# Patient Record
Sex: FEMALE | Race: WHITE | NOT HISPANIC OR LATINO | Employment: OTHER | URBAN - METROPOLITAN AREA
[De-identification: names, ages, dates, MRNs, and addresses within clinical notes are randomized per-mention and may not be internally consistent; named-entity substitution may affect disease eponyms.]

---

## 2017-06-11 ENCOUNTER — HOSPITAL ENCOUNTER (EMERGENCY)
Facility: CLINIC | Age: 47
Discharge: HOME | End: 2017-06-11
Attending: EMERGENCY MEDICINE | Admitting: EMERGENCY MEDICINE
Payer: MEDICARE

## 2017-06-11 VITALS
WEIGHT: 135 LBS | SYSTOLIC BLOOD PRESSURE: 120 MMHG | DIASTOLIC BLOOD PRESSURE: 78 MMHG | BODY MASS INDEX: 23.92 KG/M2 | TEMPERATURE: 98 F | HEIGHT: 63 IN | OXYGEN SATURATION: 96 % | RESPIRATION RATE: 14 BRPM | HEART RATE: 78 BPM

## 2017-06-11 DIAGNOSIS — F41.9 ANXIETY: ICD-10-CM

## 2017-06-11 DIAGNOSIS — G47.00 INSOMNIA, UNSPECIFIED TYPE: ICD-10-CM

## 2017-06-11 DIAGNOSIS — F32.A DEPRESSION, UNSPECIFIED DEPRESSION TYPE: ICD-10-CM

## 2017-06-11 PROCEDURE — 99282 EMERGENCY DEPT VISIT SF MDM: CPT

## 2017-06-11 RX ORDER — LORAZEPAM 0.5 MG/1
.5-1 TABLET ORAL EVERY 8 HOURS PRN
Qty: 20 TABLET | Refills: 0 | Status: SHIPPED | OUTPATIENT
Start: 2017-06-11 | End: 2017-12-19

## 2017-06-11 ASSESSMENT — ENCOUNTER SYMPTOMS
APPETITE CHANGE: 1
NERVOUS/ANXIOUS: 1
SLEEP DISTURBANCE: 1

## 2017-06-11 NOTE — ED NOTES
Patient reports increased anxiety and worsening depression over last two weeks with near daily panic attacks and sleeping 2-3 hours per night.  States recent stressors includes not seeing her daughter and new granddaughter.  Here with good friend and PCA.  Denies any suicidal ideations.  Has history of depression but not currently taking medication other than her seizure medications.  No meds for anxiety.  Says she does not have a psychiatrist or therapist at this time.

## 2017-06-11 NOTE — ED PROVIDER NOTES
"  History     Chief Complaint:  Anxiety     HPI   Suzi Mckoy is a 47 year old female with epilepsy who presents to the emergency department today with her PCA who is also her boyfriend for evaluation of anxiety. The patient reports that for the past 2-3 weeks she has been dealing with increased depression, anxiety, and difficulty sleeping. She states that she has only been getting two hours of sleep per night and has tried Benadryl, Melatonin, and Nyquil with little effect. She reports that her anxiety makes her feel like she just wants to \"run out of the house.\" The patient reports that her depression stems from the fact that she had a falling out with her daughter and her daughter just had a child who the patient has not yet been able to meet because of this recent falling out. The patient also reports that her ex- is has an alcohol problem. The patient endorses a decreased appetite, decreased focus, and decreased energy level. She denies any suicidal or homicidal ideation. The patient reports that she use to take Seroquel for her depression but is not currently taking anything. She notes that she has seen Dr. Jero Hernandez of the [facility] in the past.     Allergies:  No Known Drug Allergies    Medications:    Keppra  Felbatol  Lamictal  Mirena    Past Medical History:    Anxiety   Cerebral embolism with cerebral infarction   Depressive disorder   Epilepsy complicating pregnancy, childbirth, or the puerperium, unspecified as to episode of care or not applicable  Memory loss      Past Surgical History:     delivery   Craniotomy  Laser wart, vulvar warts     Family History:    Maternal Grandmother: Breast Cancer   Maternal Grandfather: Prostate Cancer  Father: Heart Disease, Genitourinary Problems    Social History:  The patient was accompanied to the ED by her PCA who is also her boyfriend.  Smoking Status: Former Smoker  Alcohol Use: Positive  Marital Status:  Single [1] " "    Review of Systems   Constitutional: Positive for appetite change (Decreased).   Psychiatric/Behavioral: Positive for sleep disturbance (Unable to sleep well). Negative for suicidal ideas. The patient is nervous/anxious.         Depression   All other systems reviewed and are negative.    Physical Exam   Vitals:  Patient Vitals for the past 24 hrs:   BP Pulse Resp SpO2 Height Weight   06/11/17 0404 - - - 99 % - -   06/11/17 0403 123/62 89 18 - 1.6 m (5' 3\") 61.2 kg (135 lb)       Physical Exam  General: Patient in mild distress.  Alert and cooperative with exam. Normal mentation  HEENT: NC/AT. Conjunctiva without injection or scleral icterus. External ears normal.  Respiratory: Breathing comfortably on room air  CV: Normal rate, all extremities well perfused  GI:  Non-distended abdomen  Skin: Warm, dry, no rashes/open wounds on exposed skin  Musculoskeletal: No obvious deformities  Neuro: Alert, answers questions appropriately. No gross motor deficits  Psychiatric: Endorses depression with decreased sleep, appetite, and energy. Denies anhedonia, SI, and HI.     Emergency Department Course     Emergency Department Course:  Nursing notes and vitals reviewed.  I performed an exam of the patient as documented above.   I discussed the treatment plan with the patient. They expressed understanding of this plan and consented to discharge. They will be discharged home with instructions for care and follow up. In addition, the patient will return to the emergency department if their symptoms persist, worsen, if new symptoms arise or if there is any concern.  All questions were answered.  I personally reviewed the physical exam results with the patient and answered all related questions prior to discharge.  Impression & Plan      Medical Decision Making:  Suzi Mckoy is a 47 year old female who presents with a several week history of worsening depression, anxiety, and insomnia. Patient's medical history and records were " reviewed. The patient denies suicidal or homicidal ideation on exam. She has no regular psychiatry follow up and was on Seroquel in the past but has since discontinued this medication. No indication at this time for laboratory or imaging work up. Outpatient referral made to mental health services. Patient was provided short prescription for Ativan for anxiety and recommended close follow up with primary care physician. Return precautions discussed. Patient discharged to home.     Diagnosis:    ICD-10-CM    1. Depression, unspecified depression type F32.9 MENTAL HEALTH REFERRAL   2. Anxiety F41.9    3. Insomnia, unspecified type G47.00      Disposition:   The patient is discharged to home.    Discharge Medications:  New Prescriptions    LORAZEPAM (ATIVAN) 0.5 MG TABLET    Take 1-2 tablets (0.5-1 mg) by mouth every 8 hours as needed for anxiety     Scribe Disclosure:  Frantz ANGELES, am serving as a scribe at 4:33 AM on 6/11/2017 to document services personally performed by Marvin Natarajan DO, based on my observations and the provider's statements to me.     EMERGENCY DEPARTMENT       Marvin Natarajan DO  06/11/17 0700

## 2017-06-11 NOTE — ED AVS SNAPSHOT
Emergency Department    [address] [place] [id number]    Phone:  [phone]    Fax:  [phone]                                       Suzi Mckoy   MRN: [id number]    Department:   Emergency Department   Date of Visit:  6/11/2017           After Visit Summary Signature Page     I have received my discharge instructions, and my questions have been answered. I have discussed any challenges I see with this plan with the nurse or doctor.    ..........................................................................................................................................  Patient/Patient Representative Signature      ..........................................................................................................................................  Patient Representative Print Name and Relationship to Patient    ..................................................               ................................................  Date                                            Time    ..........................................................................................................................................  Reviewed by Signature/Title    ...................................................              ..............................................  Date                                                            Time

## 2017-06-11 NOTE — DISCHARGE INSTRUCTIONS
Understanding Anxiety Disorders  Almost everyone gets nervous now and then. It s normal to have knots in your stomach before a test, or for your heart to race on a first date. But an anxiety disorder is much more than a case of nerves. In fact, its symptoms may be overwhelming. But treatment can relieve many of these symptoms. Talking to your doctor is the first step.    What are anxiety disorders?  An anxiety disorder causes intense feelings of panic and fear. These feelings may arise for no apparent reason. And they tend to recur again and again. They may prevent you from coping with life and cause you great distress. As a result, you may avoid anything that triggers your fear. In extreme cases, you may never leave the house. Anxiety disorders may cause other symptoms, such as:    Obsessive thoughts you can t control    Constant nightmares or painful thoughts of the past    Nausea, sweating, and muscle tension    Difficulty sleeping or concentrating  What causes anxiety disorders?  Anxiety disorders tend to run in families. For some people, childhood abuse or neglect may play a role. For others, stressful life events or trauma may trigger anxiety disorders. Anxiety can trigger low self-esteem and poor coping skills.  Common anxiety disorders    Panic disorder: This causes an intense fear of being in danger.    Phobias: These are extreme fears of certain objects, places, or events.    Obsessive-compulsive disorder: This causes you to have unwanted thoughts. You also may perform certain actions over and over.    Posttraumatic stress disorder: This occurs in people who have survived a terrible ordeal. It can cause nightmares and flashbacks about the event.    Generalized anxiety disorder: This causes constant worry that can greatly disrupt your life.   Getting better  You may believe that nothing can help you. Or, you might fear what others may think. But most anxiety symptoms can be eased. Having an anxiety  disorder is nothing to be ashamed of. Most people do best with treatment that combines medication and therapy. Although these aren t cures, they can help you live a healthier life.    1090-4390 [employer]. [address] [place] [address], [place] [address]. All rights reserved. This information is not intended as a substitute for professional medical care. Always follow your healthcare professional's instructions.

## 2017-06-11 NOTE — ED AVS SNAPSHOT
Emergency Department    [address] [place] [id number]    Phone:  [phone]    Fax:  [phone]                                       Suzi Mckoy   MRN: [id number]    Department:   Emergency Department   Date of Visit:  6/11/2017           Patient Information     Date Of Birth          1970        Your diagnoses for this visit were:     Depression, unspecified depression type     Anxiety     Insomnia, unspecified type        You were seen by Marvin Natarajan DO.      Follow-up Information     Follow up with Primary care doctor In 3 days.        Discharge Instructions         Understanding Anxiety Disorders  Almost everyone gets nervous now and then. It s normal to have knots in your stomach before a test, or for your heart to race on a first date. But an anxiety disorder is much more than a case of nerves. In fact, its symptoms may be overwhelming. But treatment can relieve many of these symptoms. Talking to your doctor is the first step.    What are anxiety disorders?  An anxiety disorder causes intense feelings of panic and fear. These feelings may arise for no apparent reason. And they tend to recur again and again. They may prevent you from coping with life and cause you great distress. As a result, you may avoid anything that triggers your fear. In extreme cases, you may never leave the house. Anxiety disorders may cause other symptoms, such as:    Obsessive thoughts you can t control    Constant nightmares or painful thoughts of the past    Nausea, sweating, and muscle tension    Difficulty sleeping or concentrating  What causes anxiety disorders?  Anxiety disorders tend to run in families. For some people, childhood abuse or neglect may play a role. For others, stressful life events or trauma may trigger anxiety disorders. Anxiety can trigger low self-esteem and poor coping skills.  Common anxiety disorders    Panic disorder: This causes an intense fear of  being in danger.    Phobias: These are extreme fears of certain objects, places, or events.    Obsessive-compulsive disorder: This causes you to have unwanted thoughts. You also may perform certain actions over and over.    Posttraumatic stress disorder: This occurs in people who have survived a terrible ordeal. It can cause nightmares and flashbacks about the event.    Generalized anxiety disorder: This causes constant worry that can greatly disrupt your life.   Getting better  You may believe that nothing can help you. Or, you might fear what others may think. But most anxiety symptoms can be eased. Having an anxiety disorder is nothing to be ashamed of. Most people do best with treatment that combines medication and therapy. Although these aren t cures, they can help you live a healthier life.    5796-9283 [employer]. [address] [place] [address]. All rights reserved. This information is not intended as a substitute for professional medical care. Always follow your healthcare professional's instructions.          Discharge References/Attachments     DEPRESSION (ENGLISH)      24 Hour Appointment Hotline       To make an appointment at any [facility] clinic, call [phone] ([phone]). If you don't have a family doctor or clinic, we will help you find one. [facility] are conveniently located to serve the needs of you and your family.          ED Discharge Orders     MENTAL HEALTH REFERRAL       Your provider has referred you to: [facility]: [facility] - [facility] [phone]   [email or web address]   *Referral from [facility] Primary Care Provider required - Consultation Model - medication management & future refills will be returned to [facility] PCP upon completion of evaluation  *Please call to schedule an appointment.    All scheduling is subject to the  client's specific insurance plan & benefits, provider/location availability, and provider clinical specialities.  Please arrive 15 minutes early for your first appointment and bring your completed paperwork.    Please be aware that coverage of these services is subject to the terms and limitations of your health insurance plan.  Call member services at your health plan with any benefit or coverage questions.                     Review of your medicines      START taking        Dose / Directions Last dose taken    LORazepam 0.5 MG tablet   Commonly known as:  ATIVAN   Dose:  0.5-1 mg   Quantity:  20 tablet        Take 1-2 tablets (0.5-1 mg) by mouth every 8 hours as needed for anxiety   Refills:  0          Our records show that you are taking the medicines listed below. If these are incorrect, please call your family doctor or clinic.        Dose / Directions Last dose taken    FELBATOL 600 MG tablet   Generic drug:  felbamate        1 TABLET 4 TIMES DAILY   Refills:  0        KEPPRA 500 MG tablet   Generic drug:  levETIRAcetam        6 daily   Refills:  0        LAMICTAL 100 MG tablet   Generic drug:  lamoTRIgine        1 TABLET TWICE DAILY   Refills:  0        MIRENA (52 MG) 20 MCG/24HR IUD   Quantity:  1   Generic drug:  levonorgestrel        insert per routine   Refills:  0                Prescriptions were sent or printed at these locations (1 Prescription)                   Other Prescriptions                Printed at Department/Unit printer (1 of 1)         LORazepam (ATIVAN) 0.5 MG tablet                Orders Needing Specimen Collection     None      Pending Results     No orders found from 6/9/2017 to 6/12/2017.            Pending Culture Results     No orders found from 6/9/2017 to 6/12/2017.            Pending Results Instructions     If you had any lab results that were not finalized at the time of your Discharge, you can call the ED Lab Result RN at [phone]. You will be contacted by this team for  any positive Lab results or changes in treatment. The nurses are available 7 days a week from 10A to 6:30P.  You can leave a message 24 hours per day and they will return your call.        Test Results From Your Hospital Stay               Clinical Quality Measure: Blood Pressure Screening     Your blood pressure was checked while you were in the emergency department today. The last reading we obtained was  BP: 123/62 . Please read the guidelines below about what these numbers mean and what you should do about them.  If your systolic blood pressure (the top number) is less than 120 and your diastolic blood pressure (the bottom number) is less than 80, then your blood pressure is normal. There is nothing more that you need to do about it.  If your systolic blood pressure (the top number) is 120-139 or your diastolic blood pressure (the bottom number) is 80-89, your blood pressure may be higher than it should be. You should have your blood pressure rechecked within a year by a primary care provider.  If your systolic blood pressure (the top number) is 140 or greater or your diastolic blood pressure (the bottom number) is 90 or greater, you may have high blood pressure. High blood pressure is treatable, but if left untreated over time it can put you at risk for heart attack, stroke, or kidney failure. You should have your blood pressure rechecked by a primary care provider within the next 4 weeks.  If your provider in the emergency department today gave you specific instructions to follow-up with your doctor or provider even sooner than that, you should follow that instruction and not wait for up to 4 weeks for your follow-up visit.        Thank you for choosing [facility]       Thank you for choosing [facility] for your care. Our goal is always to provide you with excellent care. Hearing back from our patients is one way we can continue to improve our services. Please take a few minutes to complete the written survey that  "you may receive in the mail after you visit with us. Thank you!        [employer] Information     [employer] lets you send messages to your doctor, view your test results, renew your prescriptions, schedule appointments and more. To sign up, go to [email or web address] . Click on \"Log in\" on the left side of the screen, which will take you to the Welcome page. Then click on \"Sign up Now\" on the right side of the page.     You will be asked to enter the access code listed below, as well as some personal information. Please follow the directions to create your username and password.     Your access code is: [email or web address]  Expires: 2017  5:09 AM     Your access code will  in 90 days. If you need help or a new code, please call your [facility] clinic or [phone].        Care EveryWhere ID     This is your Care EveryWhere ID. This could be used by other organizations to access your [facility] medical records  [id number]        After Visit Summary       This is your record. Keep this with you and show to your community pharmacist(s) and doctor(s) at your next visit.                  "

## 2017-12-18 ENCOUNTER — HOSPITAL ENCOUNTER (EMERGENCY)
Facility: CLINIC | Age: 47
Discharge: PSYCHIATRIC OR SUBSTANCE USE FACILITY | End: 2017-12-18
Attending: EMERGENCY MEDICINE | Admitting: EMERGENCY MEDICINE
Payer: MEDICARE

## 2017-12-18 ENCOUNTER — HOSPITAL ENCOUNTER (INPATIENT)
Facility: CLINIC | Age: 47
LOS: 1 days | Discharge: HOME | DRG: 885 | End: 2017-12-19
Attending: PSYCHIATRY & NEUROLOGY | Admitting: PSYCHIATRY & NEUROLOGY
Payer: MEDICARE

## 2017-12-18 VITALS
SYSTOLIC BLOOD PRESSURE: 121 MMHG | RESPIRATION RATE: 18 BRPM | TEMPERATURE: 98.4 F | BODY MASS INDEX: 23.92 KG/M2 | WEIGHT: 135 LBS | HEIGHT: 63 IN | DIASTOLIC BLOOD PRESSURE: 80 MMHG | HEART RATE: 88 BPM | OXYGEN SATURATION: 98 %

## 2017-12-18 DIAGNOSIS — R45.851 SUICIDAL IDEATION: ICD-10-CM

## 2017-12-18 PROBLEM — R45.89 SUICIDAL BEHAVIOR: Status: ACTIVE | Noted: 2017-12-18

## 2017-12-18 LAB
AMPHETAMINES UR QL SCN: NEGATIVE
BARBITURATES UR QL: NEGATIVE
BENZODIAZ UR QL: NEGATIVE
CANNABINOIDS UR QL SCN: NEGATIVE
COCAINE UR QL: NEGATIVE
HCG UR QL: NEGATIVE
OPIATES UR QL SCN: NEGATIVE
PCP UR QL SCN: NEGATIVE

## 2017-12-18 PROCEDURE — 90791 PSYCH DIAGNOSTIC EVALUATION: CPT

## 2017-12-18 PROCEDURE — 12400007 ZZH R&B MH INTERMEDIATE UMMC

## 2017-12-18 PROCEDURE — 80307 DRUG TEST PRSMV CHEM ANLYZR: CPT | Performed by: EMERGENCY MEDICINE

## 2017-12-18 PROCEDURE — 99285 EMERGENCY DEPT VISIT HI MDM: CPT | Mod: 25

## 2017-12-18 PROCEDURE — 81025 URINE PREGNANCY TEST: CPT | Performed by: EMERGENCY MEDICINE

## 2017-12-18 RX ORDER — TRAZODONE HYDROCHLORIDE 50 MG/1
50 TABLET, FILM COATED ORAL
Status: DISCONTINUED | OUTPATIENT
Start: 2017-12-18 | End: 2017-12-19 | Stop reason: HOSPADM

## 2017-12-18 RX ORDER — ACETAMINOPHEN 325 MG/1
650 TABLET ORAL EVERY 4 HOURS PRN
Status: DISCONTINUED | OUTPATIENT
Start: 2017-12-18 | End: 2017-12-19 | Stop reason: HOSPADM

## 2017-12-18 RX ORDER — OLANZAPINE 5 MG/1
5-10 TABLET ORAL 3 TIMES DAILY PRN
Status: DISCONTINUED | OUTPATIENT
Start: 2017-12-18 | End: 2017-12-19 | Stop reason: HOSPADM

## 2017-12-18 RX ORDER — HYDROXYZINE HYDROCHLORIDE 25 MG/1
25-50 TABLET, FILM COATED ORAL EVERY 4 HOURS PRN
Status: DISCONTINUED | OUTPATIENT
Start: 2017-12-18 | End: 2017-12-19 | Stop reason: HOSPADM

## 2017-12-18 RX ORDER — OLANZAPINE 10 MG/2ML
5-10 INJECTION, POWDER, FOR SOLUTION INTRAMUSCULAR 3 TIMES DAILY PRN
Status: DISCONTINUED | OUTPATIENT
Start: 2017-12-18 | End: 2017-12-19 | Stop reason: HOSPADM

## 2017-12-18 RX ORDER — ALUMINA, MAGNESIA, AND SIMETHICONE 2400; 2400; 240 MG/30ML; MG/30ML; MG/30ML
30 SUSPENSION ORAL EVERY 4 HOURS PRN
Status: DISCONTINUED | OUTPATIENT
Start: 2017-12-18 | End: 2017-12-19 | Stop reason: HOSPADM

## 2017-12-18 RX ORDER — BISACODYL 10 MG
10 SUPPOSITORY, RECTAL RECTAL DAILY PRN
Status: DISCONTINUED | OUTPATIENT
Start: 2017-12-18 | End: 2017-12-19 | Stop reason: HOSPADM

## 2017-12-18 NOTE — ED NOTES
Pt friend, Getachew, brings pt her purse, baby doll and medications. Per MD, pt can take her 1600 dose of medications. Pt takes them with this RN at bedside. Pt given baby doll and treats the doll as if it is her baby. Friend allowed in the room with pt.

## 2017-12-18 NOTE — ED PROVIDER NOTES
"  History     Chief Complaint:  Suicidal      The history is provided by the patient.      Suzi Mckoy is a 47 year old female with a history of seizures currently using Felbatol, Keppra, and Ativan who presents to the ED via police for evaluation of suicidal intentions. Earlier today, the patient called her daughter, who called 911 as the patient was threatening to jump over a balcony. Police found her looking over the balcony and brought her to the ED.     Here in the ED, the patient states that she feels depressed and \"worried\" because her daughter will not come home for the holidays. She states she is being ostracized from her daughter and and her grandchild. The patient denies suicidal or homicidal intention.  Denies auditory or visual hallucinations.  Denies history of psychiatric disorder hospitalization.  Denies use of psychiatric medications. She denies using alcohol or drugs. She does not currently have a job and lives alone.     Allergies:  NKDA    Medications:    Ativan  Keppra  Felbatol  Lamictal  Mirena    Past Medical History:    Anxiety  Cerebral embolism with cerebral infarction  Depression  Epilepsy  Memory loss    Past Surgical History:    Craniotomy  Vulvar warts    Family History:    Heart disease    Social History:  Presented alone.  Lives alone in an apartment complex.  Unemployed.  Former smoker.  Positive for alcohol use.   Marital Status:  Single     Review of Systems   Psychiatric/Behavioral: Positive for dysphoric mood and suicidal ideas.   All other systems reviewed and are negative.    Physical Exam   First Vitals:  BP: 117/79  Pulse: 90  Temp: 98.4  F (36.9  C)  Resp: 18  Height: 160 cm (5' 3\")  SpO2: 98 %    Physical Exam  General: WD/WN; well appearing middle-aged  woman; cooperative; malodorous  Head:  Atraumatic  Eyes:  EOMI; conjunctivae, lids, and sclerae are normal  ENT:    Normal nose; MMM  Neck:  Supple; normal ROM  Resp:  No respiratory " "distress  GI:  Nondistended    MS:  Normal ROM  Skin:  Warm; non-diaphoretic; no pallor  Neuro:  Awake; A&Ox3  Psych:   Blunted affect; disorganized, tangential thinking   Vitals reviewed.    Emergency Department Course   Laboratory:  Drug abuse screen 77 urine (WY, RH, SH): All are negative  HCG qualitative urine: negative    Emergency Department Course:  Nursing notes and vitals reviewed. 1550 I performed an exam of the patient as documented above.     The patient provided a urine sample here in the emergency department. This was sent for laboratory testing, findings above.     1755 I discussed the case with DEC.    1800 I rechecked the patient and discussed the results of her workup thus far and plan for inpatient hospitalization.    Findings and plan explained to the patient. Patient will be transferred to [place] [address] via EMS. Dr. Painting accepts patient for further monitoring, evaluation, and treatment.    Impression & Plan    Medical Decision Making:  Suzi Mckoy is a 47 year old female who presents on a police-initiated health officer hold after she was reportedly looking off the balcony of her apartment complex indicating she may jump. Patient states she does not need to be here and that her daughter was just concerned after she made some statements she \"shouldn't have made\" which she states is just due to a lot of worry she has for her daughter. Later, patient's friend arrived and states the patient did say she wanted to jump off the balcony to kill herself and made other suicidal statements. Patient has some odd behaviors on exam, treating a doll as if it is her baby. She has somewhat tangential thinking and is perseverating on her daughter and how her daughter treats her grandchild.    Urine drug screen is negative. Patient is not pregnant. She was evaluated by DEC  who agrees that given the statements she made per her friend's report, as well as the 's written statement, that " she warrants admission to inpatient psychiatry for suicidal ideation although patient states she is not suicidal. I do not feel comfortable with discharge home at this time given the reported history of today's events. Further, she seems somewhat cognitively delayed and I have some worry for her ability for her to take care of herself, particularly with tangential thinking. It is unclear if patient's tangential thoughts and perseverating on her daughter's treatment of her grandchild is indicative of psychosis or if this is baseline for the patient. I discussed with the patient that she would be admitted to psychiatry for further evaluation of her suicidal statements as I do not believe it is safe for her to go home. Patient is unhappy with this plan as she believes she can be discharged. However, patient was accepted to [facility] [address] under Dr. Painting and Michelle Aranda. Patient was transferred via ambulance on a transfer hold for further evaluation and treatment by psychiatry.       Diagnosis:    ICD-10-CM   1. Suicidal ideation R45.851       Disposition:  Transferred to [facility] [address] via ambulance under the care of Dr. Painting and Michelle Aranda.    I, Vikki Goodwin, am serving as a scribe on 12/18/2017 at 1550 PM to personally document services performed by Amber Vidales MD based on my observations and the provider's statements to me.       Vikki Goodwin  12/18/2017    EMERGENCY DEPARTMENT       Amber Vidales MD  12/19/17 0037

## 2017-12-18 NOTE — IP AVS SNAPSHOT
[address]    [address]    [place] [address]    Phone:  [phone]                                       After Visit Summary   12/18/2017    Suzi Mckoy    MRN: [id number]           After Visit Summary Signature Page     I have received my discharge instructions, and my questions have been answered. I have discussed any challenges I see with this plan with the nurse or doctor.    ..........................................................................................................................................  Patient/Patient Representative Signature      ..........................................................................................................................................  Patient Representative Print Name and Relationship to Patient    ..................................................               ................................................  Date                                            Time    ..........................................................................................................................................  Reviewed by Signature/Title    ...................................................              ..............................................  Date                                                            Time

## 2017-12-18 NOTE — IP AVS SNAPSHOT
MRN:[id number]                      After Visit Summary   12/18/2017    Suzi Mckoy    MRN: [id number]           Thank you!     Thank you for choosing [facility] for your care. Our goal is always to provide you with excellent care.        Patient Information     Date Of Birth          1970        Designated Caregiver       Most Recent Value    Caregiver    Will someone help with your care after discharge? yes    Name of designated caregiver Joseluis ROJAS    Phone number of caregiver [phone]    Caregiver address [address] [facility]      About your hospital stay     You were admitted on:  December 18, 2017 You last received care in the:  [facility]    You were discharged on:  December 19, 2017       Who to Call     For medical emergencies, please call 911.  For non-urgent questions about your medical care, please call your primary care provider or clinic, None          Attending Provider     Provider Specialty    Owen Painting MD Psychiatry       Primary Care Provider Fax #    Physician No Ref-Primary [phone]      Further instructions from your care team       . Behavioral Discharge Planning and Instructions      Summary:  You were admitted on 12/18/2017  due to Suicidal Ideations.  You were treated by Michelle Doshi NP and discharged on 12/19/2017 from [facility] to Home      Health Care Follow-up Appointments:   Medical Appointment Date/Time: You plan to set this up upon discharge    Provider: Dr. Hernandez  [facility] [address], [place] [address]  Phone: [phone]    Your  is Ysabel Whitehead. Phone: [phone]  Fax: [phone]  You also have supportive services through [employer]  Phone: [phone]  Fax: [phone]    You stated that you are not interested in other services at this time    Attend all scheduled appointments with your outpatient providers. Call at least 24 hours in advance if you need to reschedule an  "appointment to ensure continued access to your outpatient providers.   Major Treatments, Procedures and Findings:  You were provided with: a psychiatric assessment, assessed for medical stability, medication evaluation and/or management and milieu management    Symptoms to Report: feeling more aggressive, increased confusion, losing more sleep, mood getting worse or thoughts of suicide    Early warning signs can include: increased depression or anxiety sleep disturbances increased thoughts or behaviors of suicide or self-harm  increased unusual thinking, such as paranoia or hearing voices    Safety and Wellness:  Take all medicines as directed.  Make no changes unless your doctor suggests them.      Follow treatment recommendations.  Refrain from alcohol and non-prescribed drugs.  If there is a concern for safety, call 911.    Resources:   Crisis Intervention: 946.246.8059 or 303-558-3996 (TTY: 194.274.9941).  Call anytime for help.  National [place] on Mental Illness (www.[place].tez.org): 843.677.3303 or 978-460-4632.  Suicide Awareness Voices of Education (SAVE) (www.save.org): 995-873-TSLQ (8370)  National Suicide Prevention Line (www.mentalhealthmn.org): 726-275-GKHF (7796)  Mental Health Consumer/Survivor Network of [place] (www.mhcsn.net): 651.545.8326 or 274-386-9777  Mental Health Association of [place] (www.mentalhealth.org): 336.346.2833 or 576-645-5779  Self- Management and Recovery Training., SMART-- Toll free: 398.451.6117  www."[employer]  Text 4 Life: txt \"LIFE\" to [address]2695 for immediate support and crisis intervention  Crisis text line: Text \"START\" to 988-019. Free, confidential, 24/7.  Crisis Intervention: 408.197.5182 or 446-202-7663. Call anytime for help.     The treatment team has appreciated the opportunity to work with you.     If you have any questions or concerns our unit number is [phone]  You may be receiving a follow-up phone call within the next three days from a representative from " "behavioral health.    You have identified the best phone number to reach you as [phone]           Pending Results     No orders found for last 3 day(s).            Admission Information     Date & Time Provider Department Dept. Phone    2017 Owen Painting MD [facility] [phone]      Your Vitals Were     Blood Pressure Pulse Temperature Respirations Height Weight    116/56 85 98.3  F (36.8  C) (Oral) 16 1.626 m (5' 4\") 56.7 kg (125 lb)    Pulse Oximetry BMI (Body Mass Index)                98% 21.46 kg/m2          MyChart Information     [email or web address] lets you send messages to your doctor, view your test results, renew your prescriptions, schedule appointments and more. To sign up, go to [email or web address] . Click on \"Log in\" on the left side of the screen, which will take you to the Welcome page. Then click on \"Sign up Now\" on the right side of the page.     You will be asked to enter the access code listed below, as well as some personal information. Please follow the directions to create your username and password.     Your access code is: [email or web address]  Expires: 3/19/2018  4:26 PM     Your access code will  in 90 days. If you need help or a new code, please call your [facility] clinic or [phone].        Care EveryWhere ID     This is your Care EveryWhere ID. This could be used by other organizations to access your [facility] medical records  [id number]        Equal Access to Services     [facility] AH: Hadii aad ku hadasho Soomaali, waaxda luqadaha, qaybta kaalmada adeegyada, alyssa marte . So [facility] [phone].    ATENCIÓN: Si habla español, tiene a reis disposición servicios gratuitos de asistencia lingüística. Llame al [phone].    We comply with applicable federal civil rights laws and [place] laws. We do not discriminate on the basis of race, color, national origin, age, disability, sex, sexual orientation, or gender identity.               Review of " your medicines      UNREVIEWED medicines. Ask your doctor about these medicines        Dose / Directions    FELBATOL 600 MG tablet   Generic drug:  felbamate        1 TABLET 4 TIMES DAILY   Refills:  0       KEPPRA 500 MG tablet   Generic drug:  levETIRAcetam        6 daily   Refills:  0       LAMICTAL 100 MG tablet   Generic drug:  lamoTRIgine        1 TABLET TWICE DAILY   Refills:  0       LORazepam 0.5 MG tablet   Commonly known as:  ATIVAN        Dose:  0.5-1 mg   Take 1-2 tablets (0.5-1 mg) by mouth every 8 hours as needed for anxiety   Quantity:  20 tablet   Refills:  0       MIRENA (52 MG) 20 MCG/24HR IUD   Used for:  Encounter for insertion or removal of intrauterine contraceptive device   Generic drug:  levonorgestrel        insert per routine   Quantity:  1   Refills:  0                Protect others around you: Learn how to safely use, store and throw away your medicines at www.disposemymeds.org.             Medication List: This is a list of all your medications and when to take them. Check marks below indicate your daily home schedule. Keep this list as a reference.      Medications           Morning Afternoon Evening Bedtime As Needed    FELBATOL 600 MG tablet   1 TABLET 4 TIMES DAILY   Generic drug:  felbamate                                KEPPRA 500 MG tablet   6 daily   Generic drug:  levETIRAcetam                                LAMICTAL 100 MG tablet   1 TABLET TWICE DAILY   Generic drug:  lamoTRIgine                                LORazepam 0.5 MG tablet   Commonly known as:  ATIVAN   Take 1-2 tablets (0.5-1 mg) by mouth every 8 hours as needed for anxiety                                MIRENA (52 MG) 20 MCG/24HR IUD   insert per routine   Generic drug:  levonorgestrel

## 2017-12-19 VITALS
TEMPERATURE: 98.3 F | HEIGHT: 64 IN | WEIGHT: 125 LBS | BODY MASS INDEX: 21.34 KG/M2 | HEART RATE: 85 BPM | OXYGEN SATURATION: 98 % | RESPIRATION RATE: 16 BRPM | DIASTOLIC BLOOD PRESSURE: 56 MMHG | SYSTOLIC BLOOD PRESSURE: 116 MMHG

## 2017-12-19 LAB
ALBUMIN SERPL-MCNC: 3.7 G/DL (ref 3.4–5)
ALP SERPL-CCNC: 45 U/L (ref 40–150)
ALT SERPL W P-5'-P-CCNC: 9 U/L (ref 0–50)
ANION GAP SERPL CALCULATED.3IONS-SCNC: 10 MMOL/L (ref 3–14)
AST SERPL W P-5'-P-CCNC: 12 U/L (ref 0–45)
BASOPHILS # BLD AUTO: 0 10E9/L (ref 0–0.2)
BASOPHILS NFR BLD AUTO: 0.7 %
BILIRUB SERPL-MCNC: 0.3 MG/DL (ref 0.2–1.3)
BUN SERPL-MCNC: 14 MG/DL (ref 7–30)
CALCIUM SERPL-MCNC: 8.4 MG/DL (ref 8.5–10.1)
CHLORIDE SERPL-SCNC: 105 MMOL/L (ref 94–109)
CHOLEST SERPL-MCNC: 237 MG/DL
CO2 SERPL-SCNC: 25 MMOL/L (ref 20–32)
CREAT SERPL-MCNC: 0.74 MG/DL (ref 0.52–1.04)
DIFFERENTIAL METHOD BLD: ABNORMAL
EOSINOPHIL # BLD AUTO: 0.3 10E9/L (ref 0–0.7)
EOSINOPHIL NFR BLD AUTO: 5.4 %
ERYTHROCYTE [DISTWIDTH] IN BLOOD BY AUTOMATED COUNT: 13.3 % (ref 10–15)
GFR SERPL CREATININE-BSD FRML MDRD: 85 ML/MIN/1.7M2
GLUCOSE SERPL-MCNC: 88 MG/DL (ref 70–99)
HCT VFR BLD AUTO: 39.5 % (ref 35–47)
HDLC SERPL-MCNC: 80 MG/DL
HGB BLD-MCNC: 12.1 G/DL (ref 11.7–15.7)
IMM GRANULOCYTES # BLD: 0 10E9/L (ref 0–0.4)
IMM GRANULOCYTES NFR BLD: 0.2 %
LDLC SERPL CALC-MCNC: 142 MG/DL
LYMPHOCYTES # BLD AUTO: 2.2 10E9/L (ref 0.8–5.3)
LYMPHOCYTES NFR BLD AUTO: 39.8 %
MCH RBC QN AUTO: 29.4 PG (ref 26.5–33)
MCHC RBC AUTO-ENTMCNC: 30.6 G/DL (ref 31.5–36.5)
MCV RBC AUTO: 96 FL (ref 78–100)
MONOCYTES # BLD AUTO: 0.6 10E9/L (ref 0–1.3)
MONOCYTES NFR BLD AUTO: 10 %
NEUTROPHILS # BLD AUTO: 2.5 10E9/L (ref 1.6–8.3)
NEUTROPHILS NFR BLD AUTO: 43.9 %
NONHDLC SERPL-MCNC: 157 MG/DL
NRBC # BLD AUTO: 0 10*3/UL
NRBC BLD AUTO-RTO: 0 /100
PLATELET # BLD AUTO: 339 10E9/L (ref 150–450)
POTASSIUM SERPL-SCNC: 3.6 MMOL/L (ref 3.4–5.3)
PROT SERPL-MCNC: 7.1 G/DL (ref 6.8–8.8)
RBC # BLD AUTO: 4.12 10E12/L (ref 3.8–5.2)
SODIUM SERPL-SCNC: 140 MMOL/L (ref 133–144)
TRIGL SERPL-MCNC: 77 MG/DL
TSH SERPL DL<=0.005 MIU/L-ACNC: 2.3 MU/L (ref 0.4–4)
WBC # BLD AUTO: 5.6 10E9/L (ref 4–11)

## 2017-12-19 PROCEDURE — 99231 SBSQ HOSP IP/OBS SF/LOW 25: CPT | Performed by: PHYSICIAN ASSISTANT

## 2017-12-19 PROCEDURE — 36415 COLL VENOUS BLD VENIPUNCTURE: CPT | Performed by: NURSE PRACTITIONER

## 2017-12-19 PROCEDURE — 80053 COMPREHEN METABOLIC PANEL: CPT | Performed by: NURSE PRACTITIONER

## 2017-12-19 PROCEDURE — 85025 COMPLETE CBC W/AUTO DIFF WBC: CPT | Performed by: NURSE PRACTITIONER

## 2017-12-19 PROCEDURE — 99236 HOSP IP/OBS SAME DATE HI 85: CPT | Mod: AI | Performed by: NURSE PRACTITIONER

## 2017-12-19 PROCEDURE — 84443 ASSAY THYROID STIM HORMONE: CPT | Performed by: NURSE PRACTITIONER

## 2017-12-19 PROCEDURE — 80061 LIPID PANEL: CPT | Performed by: NURSE PRACTITIONER

## 2017-12-19 RX ORDER — LAMOTRIGINE 100 MG/1
100 TABLET ORAL
Status: DISCONTINUED | OUTPATIENT
Start: 2017-12-19 | End: 2017-12-19 | Stop reason: HOSPADM

## 2017-12-19 RX ORDER — LAMOTRIGINE 100 MG/1
200 TABLET ORAL 2 TIMES DAILY
Status: DISCONTINUED | OUTPATIENT
Start: 2017-12-19 | End: 2017-12-19 | Stop reason: HOSPADM

## 2017-12-19 RX ORDER — FELBAMATE 600 MG/1
600 TABLET ORAL 3 TIMES DAILY
Status: DISCONTINUED | OUTPATIENT
Start: 2017-12-19 | End: 2017-12-19 | Stop reason: CLARIF

## 2017-12-19 RX ORDER — FELBAMATE 600 MG/1
600 TABLET ORAL 3 TIMES DAILY
COMMUNITY
Start: 2017-12-19

## 2017-12-19 RX ORDER — FELBAMATE 600 MG/1
600 TABLET ORAL 3 TIMES DAILY
Status: DISCONTINUED | OUTPATIENT
Start: 2017-12-19 | End: 2017-12-19 | Stop reason: HOSPADM

## 2017-12-19 RX ORDER — LORAZEPAM 1 MG/1
2 TABLET ORAL ONCE
Status: DISCONTINUED | OUTPATIENT
Start: 2017-12-19 | End: 2017-12-19 | Stop reason: HOSPADM

## 2017-12-19 RX ORDER — LAMOTRIGINE 100 MG/1
200 TABLET ORAL 2 TIMES DAILY
Status: DISCONTINUED | OUTPATIENT
Start: 2017-12-19 | End: 2017-12-19 | Stop reason: CLARIF

## 2017-12-19 RX ORDER — LEVETIRACETAM 500 MG/1
1000 TABLET ORAL 3 TIMES DAILY
COMMUNITY
Start: 2017-12-19

## 2017-12-19 RX ORDER — LAMOTRIGINE 100 MG/1
200 TABLET ORAL 2 TIMES DAILY
COMMUNITY
Start: 2017-12-19

## 2017-12-19 RX ORDER — LEVETIRACETAM 500 MG/1
1000 TABLET ORAL 3 TIMES DAILY
Status: DISCONTINUED | OUTPATIENT
Start: 2017-12-19 | End: 2017-12-19 | Stop reason: HOSPADM

## 2017-12-19 RX ORDER — LEVETIRACETAM 500 MG/1
1000 TABLET ORAL 3 TIMES DAILY
Status: DISCONTINUED | OUTPATIENT
Start: 2017-12-19 | End: 2017-12-19 | Stop reason: CLARIF

## 2017-12-19 ASSESSMENT — ACTIVITIES OF DAILY LIVING (ADL)
GROOMING: INDEPENDENT
DRESS: INDEPENDENT
ORAL_HYGIENE: INDEPENDENT
LAUNDRY: WITH SUPERVISION
ORAL_HYGIENE: INDEPENDENT
DRESS: SCRUBS (BEHAVIORAL HEALTH)
GROOMING: HANDWASHING;INDEPENDENT
LAUNDRY: WITH SUPERVISION

## 2017-12-19 ASSESSMENT — ENCOUNTER SYMPTOMS: DYSPHORIC MOOD: 1

## 2017-12-19 NOTE — PLAN OF CARE
Problem: Patient Care Overview  Goal: Individualization & Mutuality  The patient completed the reasons for admit and goals for discharge in the personal plan of care.   Reasons for admit:  1)  misunderstood      Goals for discharge:  1)  To go dancing, singing, go to sleep, get medication, stay on schedule

## 2017-12-19 NOTE — PROGRESS NOTES
12/18/17 2200   Patient Belongings   Patient Belongings cell phone/electronics;clothing;keys;money (see comment);wallet   Disposition of Belongings Locker   Belongings Search Yes     Kept in Locker   Purse, 2 black wallet (with business cards and coupons), Cell Phone (flip phone back cover broken), phone , money ($10.00), 4 keys, baby doll, Pants and Jacket.     Kept with patient  Glasses and shirt    A               Admission:  I am responsible for any personal items that are not sent to the safe or pharmacy.  [facility] is not responsible for loss, theft or damage of any property in my possession.    Signature:  _________________________________ Date: _______  Time: _____                                              Staff Signature:  ____________________________ Date: ________  Time: _____      2nd Staff person, if patient is unable/unwilling to sign:    Signature: ________________________________ Date: ________  Time: _____     Discharge:  [facility] has returned all of my personal belongings:    Signature: _________________________________ Date: ________  Time: _____                                          Staff Signature:  ____________________________ Date: ________  Time: _____

## 2017-12-19 NOTE — PROGRESS NOTES
[facility] that lasted for approximately 1-1.5 minutes. Rapid response called and cancelled. VS after seizure BB=969/73, P=106, T=98.6. Patient had not taken medications prior to seizure episode. She reports that she only takes brand-name medications and attending provider and pharmacy were working on getting her medications approved. Pharmacy sent Felbamate (generic) medication and patient refused it. Patient reported that she has medications at home and that her friend and boyfriend will bring them. Writer called boyfriend and he said that they will bring them. Patient is quietly sitting in the lounge appearing depressed. She is concerned about her medications.

## 2017-12-19 NOTE — PROGRESS NOTES
"Patient's boyfriend and a friend brought in her home medications. The medications are in pill organizer box. No original containers. Writer called pharmacy to verify medications and pharmacy said they can't be able to verify them without the original bottles. Writer called the attending provider and left a message. Patient is requesting to be discharge \" I am going to have a breakdown if I keep staying here\". She denies SI/SIB, denies psychotic and paranoid thoughts  "

## 2017-12-19 NOTE — PLAN OF CARE
Problem: Patient Care Overview  Goal: Team Discussion  Team Plan:   BEHAVIORAL TEAM DISCUSSION    Participants: Provider: Michelle Doshi NP    CTC: Mya Thomas MS,   Nurse: ELLA Bazan  Psych Associate: Brodie Ho    Progress: Pt was admitted due to increased delusions and suicidal threat. Pt currently denies SI, is not interested in changing her medication. Pt does appear delusional at this time.   Continued Stay Criteria/Rationale: Pt does appear delusional; is is unclear whether she is at baseline. CTC is attempting to reach her  for additional information  Medical/Physical: hx of epilepsy with brain surgery  Precautions:   Behavioral Orders   Procedures     Code 1 - Restrict to Unit     Fall precautions     Routine Programming     As clinically indicated     Seizure precautions     Status 15     Every 15 minutes.     Suicide precautions     Plan: The patient will continue to meet w/ the treatment team for assessment and treatment planning, CTC will continue to work w/ for discharge planning.    Rationale for change in precautions or plan: pt to continue on unit for stabilization.

## 2017-12-19 NOTE — DISCHARGE INSTRUCTIONS
. Behavioral Discharge Planning and Instructions      Summary:  You were admitted on 12/18/2017  due to Suicidal Ideations.  You were treated by Michelle Doshi NP and discharged on 12/19/2017 from [facility] to Home      Health Care Follow-up Appointments:   Medical Appointment Date/Time: You plan to set this up upon discharge    Provider: Dr. Hernandez  Address: [address], [place] [address]  Phone: [phone]    Your  is Ysabel Whitehead. Phone: [phone]  Fax: [phone]  You also have supportive services through [employer]  Phone: [phone]  Fax: [phone]    You stated that you are not interested in other services at this time    Attend all scheduled appointments with your outpatient providers. Call at least 24 hours in advance if you need to reschedule an appointment to ensure continued access to your outpatient providers.   Major Treatments, Procedures and Findings:  You were provided with: a psychiatric assessment, assessed for medical stability, medication evaluation and/or management and milieu management    Symptoms to Report: feeling more aggressive, increased confusion, losing more sleep, mood getting worse or thoughts of suicide    Early warning signs can include: increased depression or anxiety sleep disturbances increased thoughts or behaviors of suicide or self-harm  increased unusual thinking, such as paranoia or hearing voices    Safety and Wellness:  Take all medicines as directed.  Make no changes unless your doctor suggests them.      Follow treatment recommendations.  Refrain from alcohol and non-prescribed drugs.  If there is a concern for safety, call 911.    Resources:   Crisis Intervention: 861.283.7473 or 446-964-6530 (TTY: 542.677.7247).  Call anytime for help.  National [place] on Mental Illness (www.[place].tez.org): 894.761.3060 or 417-362-7639.  Suicide Awareness Voices of Education (SAVE) (www.save.org): 623-118-QQHV (9497)  National Suicide  "Prevention Line (www.mentalhealthmn.org): 579-845-CRYA (0737)  Mental Health Consumer/Survivor Network of [place] (www.mhcsn.net): 612.680.5800 or 081-215-2112  Mental Health Association of [place] (www.mentalhealth.org): 388.201.4238 or 331-755-8406  Self- Management and Recovery Training., SMART-- Toll free: 507.252.6981  [employer]  Text 4 Life: txt \"LIFE\" to 01978 for immediate support and crisis intervention  Crisis text line: Text \"START\" to 274-552. Free, confidential, 24/7.  Crisis Intervention: 502.968.9277 or 749-027-4689. Call anytime for help.     The treatment team has appreciated the opportunity to work with you.     If you have any questions or concerns our unit number is [phone]  You may be receiving a follow-up phone call within the next three days from a representative from behavioral health.    You have identified the best phone number to reach you as [phone]         "

## 2017-12-19 NOTE — PROGRESS NOTES
Pt.discharged home into the care of her boyfriend and friend. She denied SI/SIB/HI. Writer provided discharge teaching about continuing with her PTA medication regimen.     Pt.discharged with all her belongings and her home medications in a pill organizer box.
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
## 2017-12-19 NOTE — PROGRESS NOTES
Pt was in  Milieu all day . She declined groups, stating she' doesn't need to be here. Pt denies s/i feelings.  She has visitors that would take her home. Pt had a [facility] in [facility] [place] & is refusing to take meds from the hospital because she does not like generic medication. Discharge is a possibilty for her     12/19/17 1400   Behavioral Health   Hallucinations denies / not responding to hallucinations   Thinking distractable   Orientation person: oriented   Memory baseline memory   Insight poor   Judgement impaired   Eye Contact at examiner   Affect full range affect   Mood anxious  (wants to leave not suicidal)   Physical Appearance/Attire attire appropriate to age and situation   Hygiene neglected grooming - unclean body, hair, teeth   Suicidality (none)   1. Wish to be Dead No   2. Non-Specific Active Suicidal Thoughts  No   Coping/Psychosocial   Verbalized Emotional State frustration   Plan Of Care Reviewed With patient   Patient Agreement with Plan of Care disagrees (describe)   Psycho Education   Type of Intervention 1:1 intervention   Response participates with encouragement   Hours 0.5   Treatment Detail check in   Activities of Daily Living   Hygiene/Grooming handwashing;independent   Oral Hygiene independent   Dress scrubs (behavioral health)   Laundry with supervision   Room Organization independent   Activity   Activity Assistance Provided independent   Behavioral Health Interventions   Depression maintain safety precautions;maintain safe secure environment;provide emotional support   Social and Therapeutic Interventions (Depression) encourage participation in therapeutic groups and milieu activities
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
## 2017-12-19 NOTE — PROGRESS NOTES
"Admission:  Patient came to the unit from [facility] on 72[facility]. Reason for admission per report: suicidal behaviors, leaning off a balcony, threatening to jump. Patient has Hx of bipolar disorder and distant IP  hospitalization as a teenager. She has seizure disorder, last seizure yesterday (she was told, but does not remember). Patient reported two brain surgeries in 1990 and 1991, followed by strokes both times. Patient takes medications as prescribed. He meds are pre set for her by a visiting nurse. Patient does not remember the doses of her medications. [facility] uses [facility] at [place]. Staff was unable to verify meds as pharmacy was closed tonight, meds not ordered. Patient had all her doses for today. She takes her am med at 10:00. Day staff will be informed to call [facility] at [phone] to verify PTA med list and call for order.     During the admission interview, patient absolutely denied that she was going to jump or that she was suicidal. She said it was a \"big misunderstanding\". She said that she likes to go out, dance, sings, eat cheese cake, and have a good time.  Patient denied all psych symptoms at this time.    However, she made grandiose statements; ex: \"I want to safe the world\" , \"I can't safe the world if I'm dead\", \"I want to fix the world\", \"I want to get the food that some people are throwing out in the garbage and give it to people who do not have what to eat\", \"I donated $100 to [facility] for in the past\", \"I want to safe all the children in the world\".   Patient made some bizarre statement, ex: \"My daughter puts her 2 yo child in a dog cage in the livingroom\", \"I don't know what to do\", \"If I report her, I'll loose my relationship with her, she was taken away from when she was 5 yo\", \"If I don't report her, my granddaughter will suffer\". Patient also talked about having two boyfriends, and wanting to  [place]. She said she wants to have another child again, " "but he is not sure, because his family is against it. Patient also said that she had two brain surgeries, because her mother \"wanted to fix her\", wanted \"the perfect child\". Patient said she lost her first child and ever since she has been having dolls, taking care of them as \"they are real children\". Patient came to the hospital with a talking doll, said \"It's my baby\". She wanted to have the doll back, so she could sleep with it, however, she was not given the doll as per unit policy.   No behavioral issues. No pain or other physical issues reported.  "

## 2017-12-19 NOTE — PROGRESS NOTES
CTC called [facility] at [phone] and spoke with Winifred. Was advised that they are the corporate office and can't find a [place] without a last name. CTC was advised to call the [place] office, [phone]. CTC called, was advised that they cannot give out any information about staff and was referred to HR.     CTC called HR, [phone]. Left a message.     Called Winifred back, was transferred to Nikki Wiggins, . Left message.     Call back from [facility]. [place]  and not through [facility]. Pt has a , Rishi. At [employer], pt has a staff person who oversees pt's budget but they don't have other involvement.    VM from Rishi  at [facility], [phone]. She stated cannot talk to CM because of no KEAGAN. CTC called back. Obtained fax number to send KEAGAN: [phone].     Call from [facility], pt's . Pt has always treated her numerous dolls as if they were children, also collects stuffed animals and gives them each a name. Has never had self-harm issues before now. Has had increased stress recently. Augie, pt's friend, and pt had a falling out; pt has gotten closer to Carlito. The last meeting had yet a third male there with whom pt is involved. Has never been good with medication compliance. This week there is a pre-appeal meeting because pt appealed her PCA results. Moved into a new apartment. Does karaoke, dances with friends at the [facility]. Delusions regarding mom and daughter are at baseline. Poor and strained relationship with family.

## 2017-12-19 NOTE — CODE/RAPID RESPONSE
"Brief Medicine Note    Rapid response called as patient started seizing in the lounge. Per RN, seizure lasted ~1 minute and self-resolved. Rapid response discontinued by time writer arrived to unit. Patient has a long-standing history of seizures and believes her last one occurred yesterday. Per psychiatry NP, had been working on verifying patient's anti-epileptic regimen which resulted in a delay in medication administration. Upon evaluation, patient sitting upright in wheelchair. Answers questions appropriately, oriented to person, month, year. Able to recognize that we are currently in a hospital. Appears breakthrough seizure in setting of increased stressors and slight delay in medications. No neurologic deficits noted.    Vital signs:  Temp: 98.3  F (36.8  C) Temp src: Oral BP: 116/56 Pulse: 85   Resp: 16 SpO2: 98 % O2 Device: None (Room air)   Height: 162.6 cm (5' 4\") Weight: 56.7 kg (125 lb)  General: Fatigued  female sitting upright in wheelchair, NAD.  HEENT: NC/AT. Moist mucous membranes.  Respiratory: Respiratory effort normal on RA. Lungs CTAB without rales, rhonchi, or wheezing.  Cardiovascular: RRR, S1/S2. No murmurs, rubs, or gallops.  Neurologic: Alert, oriented to person, place, and time. Strength 5/5 throughout upper and lower extremities. Follows commands. No facial droop, ptosis.    Plan:  - Continue with PTA regimen of keppra 1000 mg TID, lamictal 200 mg BID, and felbatol 600 mg TID    Lucero Dawson PA-C  Hospitalist Service   [phone]          "

## 2017-12-19 NOTE — PLAN OF CARE
Problem: Suicidal Behavior (Adult)  Goal: Suicidal Behavior is Absent/Minimized/Managed  Outcome: Improving  Patient denied SI, contracted for safety on the unit. She denied depression, anxiety, voices or delusions/paranoia. However, she made multiple delusional and grandiose statements. See progress note.

## 2018-07-19 ENCOUNTER — HOSPITAL ENCOUNTER (EMERGENCY)
Facility: CLINIC | Age: 48
Discharge: HOME | End: 2018-07-19
Attending: NURSE PRACTITIONER | Admitting: NURSE PRACTITIONER
Payer: MEDICARE

## 2018-07-19 ENCOUNTER — APPOINTMENT (OUTPATIENT)
Dept: GENERAL RADIOLOGY | Facility: CLINIC | Age: 48
End: 2018-07-19
Attending: NURSE PRACTITIONER
Payer: MEDICARE

## 2018-07-19 VITALS
SYSTOLIC BLOOD PRESSURE: 115 MMHG | DIASTOLIC BLOOD PRESSURE: 67 MMHG | TEMPERATURE: 99 F | BODY MASS INDEX: 23.92 KG/M2 | OXYGEN SATURATION: 99 % | RESPIRATION RATE: 20 BRPM | HEIGHT: 63 IN | WEIGHT: 135 LBS

## 2018-07-19 DIAGNOSIS — J06.9 UPPER RESPIRATORY INFECTION WITH COUGH AND CONGESTION: ICD-10-CM

## 2018-07-19 PROCEDURE — 71046 X-RAY EXAM CHEST 2 VIEWS: CPT

## 2018-07-19 PROCEDURE — 99283 EMERGENCY DEPT VISIT LOW MDM: CPT | Mod: 25

## 2018-07-19 ASSESSMENT — ENCOUNTER SYMPTOMS
FEVER: 0
COUGH: 1

## 2018-07-19 NOTE — DISCHARGE INSTRUCTIONS

## 2018-07-19 NOTE — ED AVS SNAPSHOT
Emergency Department    [address] [place] [id number]    Phone:  [phone]    Fax:  [phone]                                       Suzi Mckoy   MRN: [id number]    Department:   Emergency Department   Date of Visit:  7/19/2018           After Visit Summary Signature Page     I have received my discharge instructions, and my questions have been answered. I have discussed any challenges I see with this plan with the nurse or doctor.    ..........................................................................................................................................  Patient/Patient Representative Signature      ..........................................................................................................................................  Patient Representative Print Name and Relationship to Patient    ..................................................               ................................................  Date                                            Time    ..........................................................................................................................................  Reviewed by Signature/Title    ...................................................              ..............................................  Date                                                            Time

## 2018-07-19 NOTE — ED AVS SNAPSHOT
Emergency Department    [address] [facility] [address]    Phone:  [phone]    Fax:  [phone]                                       Suzi Mckoy   MRN: [id number]    Department:   Emergency Department   Date of Visit:  7/19/2018           Patient Information     Date Of Birth          1970        Your diagnoses for this visit were:     Upper respiratory infection with cough and congestion        You were seen by Suki Landeros CNP.      Follow-up Information     Follow up with Jaspal Thomas MD In 3 days.    Specialty:  Family Practice    Why:  or with your primary care    Contact information:    [address] [place] [address] [phone]          Follow up with  Emergency Department.    Specialty:  EMERGENCY MEDICINE    Why:  As needed, If symptoms worsen    Contact information:    [address] [facility] [id number] [phone]        Discharge Instructions       Discharge Instructions  Upper Respiratory Infection    The upper respiratory tract includes the sinuses, nasal passages, pharynx, and larynx. A URI, or upper respiratory infection, is an infection of any of the parts of the upper airway. Symptoms include runny nose, congestion, sneezing, sore throat, cough, and fever. URIs are almost always caused by a virus. Antibiotics do not help with viral infections, so are generally not prescribed. A URI is very contagious through coughing and nasal secretions; make sure you wash your hands often and clean surfaces after sneezing, coughing or touching them. While you should start to improve in 3 - 5 days, remember that sometimes a cough can linger for several weeks.    Generally, every Emergency Department visit should have a follow-up clinic visit with either a primary or a specialty clinic/provider. Please follow-up as instructed by your emergency provider today.    Return to the Emergency Department if:    Any of your symptoms get  much worse.    You seem very sick, like being too weak to get up.    You have chest pain or shortness of breath.     You have a severe headache.    You are vomiting (throwing up) so much you cannot keep fluids or medicines down.    You have confusion or seem unusually drowsy.    You have a seizure.    What can I do to help myself?    Fill any prescriptions the provider gave you and take them right away    If you have a fever, get plenty of rest and drink lots of fluids, especially water.    Using a humidifier or saline nose spray will also help loosen mucous.     Clothes or blankets will not change your fever. Do what is comfortable for you.    Bathing or sponging in lukewarm water may help you feel better.    Acetaminophen (Tylenol ) or ibuprofen (Advil , Motrin ) will help bring fever down and may help you feel more comfortable. Be sure to read and follow the package directions, and ask your provider if you have questions.    Do not drink alcohol.    Decongestants may help you feel better. You may use decongestant nose sprays Afrin  (oxymetazoline) or Chi-Synephrine  (phenylephrine hydrochloride) for up to 3 days, or may use a decongestant tablet like Sudafed  (pseudoephedrine).  If you were given a prescription for medicine here today, be sure to read all of the information (including the package insert) that comes with your prescription.  This will include important information about the medicine, its side effects, and any warnings that you need to know about.  The pharmacist who fills the prescription can provide more information and answer questions you may have about the medicine.  If you have questions or concerns that the pharmacist cannot address, please call or return to the Emergency Department.   Remember that you can always come back to the Emergency Department if you are not able to see your regular provider in the amount of time listed above, if you get any new symptoms, or if there is anything that  worries you.      24 Hour Appointment Hotline       To make an appointment at any [facility], call [phone] ([phone]). If you don't have a family doctor or clinic, we will help you find one. [place] clinics are conveniently located to serve the needs of you and your family.             Review of your medicines      Our records show that you are taking the medicines listed below. If these are incorrect, please call your family doctor or clinic.        Dose / Directions Last dose taken    FELBATOL 600 MG tablet   Dose:  600 mg   Generic drug:  felbamate        Take 1 tablet (600 mg) by mouth 3 times daily   Refills:  0        KEPPRA 1000 MG Tabs   Dose:  1000 mg   Generic drug:  levETIRAcetam        Take 1,000 mg by mouth 3 times daily   Refills:  0        lamoTRIgine 100 MG tablet   Commonly known as:  LaMICtal        Take 2 tablets (200 mg) by mouth at 8 AM and 8 PM and take 1 tablet (100 mg) every afternoon.   Refills:  0        MIRENA (52 MG) 20 MCG/24HR IUD   Quantity:  1   Generic drug:  levonorgestrel        insert per routine   Refills:  0        omeprazole 20 MG CR capsule   Commonly known as:  priLOSEC   Dose:  20 mg        Take 1 capsule (20 mg) by mouth 2 times daily (before meals)   Refills:  0                Procedures and tests performed during your visit     Chest XR,  PA & LAT      Orders Needing Specimen Collection     None      Pending Results     Date and Time Order Name Status Description    7/19/2018 1654 Chest XR,  PA & LAT Preliminary             Pending Culture Results     No orders found from 7/17/2018 to 7/20/2018.            Pending Results Instructions     If you had any lab results that were not finalized at the time of your Discharge, you can call the ED Lab Result RN at [phone]. You will be contacted by this team for any positive Lab results or changes in treatment. The nurses are available 7 days a week from 10A to 6:30P.  You can leave a message 24 hours per  day and they will return your call.        Test Results From Your Hospital Stay        7/19/2018  5:44 PM      Narrative     CHEST TWO VIEWS    7/19/2018 5:37 PM     HISTORY: Cough x two weeks.     COMPARISON: None.     FINDINGS: Cardiomediastinal silhouette within normal limits. No focal  airspace opacities. No pleural effusion. No pneumothorax identified.  The bones are unremarkable.         Impression     IMPRESSION: No acute cardiopulmonary abnormalities.                Clinical Quality Measure: Blood Pressure Screening     Your blood pressure was checked while you were in the emergency department today. The last reading we obtained was  BP: 115/67 . Please read the guidelines below about what these numbers mean and what you should do about them.  If your systolic blood pressure (the top number) is less than 120 and your diastolic blood pressure (the bottom number) is less than 80, then your blood pressure is normal. There is nothing more that you need to do about it.  If your systolic blood pressure (the top number) is 120-139 or your diastolic blood pressure (the bottom number) is 80-89, your blood pressure may be higher than it should be. You should have your blood pressure rechecked within a year by a primary care provider.  If your systolic blood pressure (the top number) is 140 or greater or your diastolic blood pressure (the bottom number) is 90 or greater, you may have high blood pressure. High blood pressure is treatable, but if left untreated over time it can put you at risk for heart attack, stroke, or kidney failure. You should have your blood pressure rechecked by a primary care provider within the next 4 weeks.  If your provider in the emergency department today gave you specific instructions to follow-up with your doctor or provider even sooner than that, you should follow that instruction and not wait for up to 4 weeks for your follow-up visit.        Thank you for choosing [facility]       Thank  "you for choosing [facility] for your care. Our goal is always to provide you with excellent care. Hearing back from our patients is one way we can continue to improve our services. Please take a few minutes to complete the written survey that you may receive in the mail after you visit with us. Thank you!        [employer] Information     [employer] lets you send messages to your doctor, view your test results, renew your prescriptions, schedule appointments and more. To sign up, go to [email or web address] . Click on \"Log in\" on the left side of the screen, which will take you to the Welcome page. Then click on \"Sign up Now\" on the right side of the page.     You will be asked to enter the access code listed below, as well as some personal information. Please follow the directions to create your username and password.     Your access code is: [email or web address]  Expires: 10/17/2018  5:48 PM     Your access code will  in 90 days. If you need help or a new code, please call your [facility] clinic or [phone].        Care EveryWhere ID     This is your Care EveryWhere ID. This could be used by other organizations to access your [facility] medical records  [id number]        Equal Access to Services     MIKKI SUTTON : Haley Westbrook, wasethda roney, qaybta kaalmada adehimanshu, alyssa smart. So [facility] [place] [phone].    ATENCIÓN: Si habla español, tiene a reis disposición servicios gratuitos de asistencia lingüística. Llame al [phone].    We comply with applicable federal civil rights laws and [place] laws. We do not discriminate on the basis of race, color, national origin, age, disability, sex, sexual orientation, or gender identity.            After Visit Summary       This is your record. Keep this with you and show to your community pharmacist(s) and doctor(s) at your next visit.                  "

## 2018-07-19 NOTE — ED PROVIDER NOTES
"  History     Chief Complaint:  Cough    The history is provided by the patient.      Suzi Mckoy is a 48 year old female, with a history of epilepsy and cerebral infarction, who presents for evaluation of a cough. The patient reports onset of a sore throat followed by cough in the last ~7 days along with a pain in her axilla that radiate into her chest only when she coughs. The patient states that her cough keeps her up at night and feels as if \"someone is poking a knife into her\". She is unable to take a deep breathing without breaking out into a coughing fit. The patient is not a smoker. Of note, the patient's boyfriend reports that he and other members of his family exhibited similar symptoms over the 4th of July and she was the last person to get the symptoms.  The other family members are improved but Suzi is not.  Patient denies fevers, shortness of breath, chest pain other than when coughing, abdominal pain, or other complaint.      CARDIAC RISK FACTORS:  Sex: Female  Tobacco/Illicit drugs- Negative          Hypertension - Negative                                  Hyperlipidemia- Negative                      Diabetes- Negative                                          Family History- Negative                        Personal History- Negative                      PE/DVT RISK FACTORS:  Sex: Female                                                Hormones- Negative -Mirena IUD (removed 6/2018)                                     Tobacco- Negative                                          Cancer- Negative                                             Travel- Negative                                              Surgery- Negative                                            Other immobilization- Negative              Personal history of PE/DVT- Negative                     Family history of PE/DVT- Negative                            Allergies:  Dilantin [Phenytoin]      Medications:  " "  Felbatol  Lamictal  Levetiracetam  Mirena  Prilosec    Past Medical History:    Suicidal behavior  Epilepsy complicating surgery  Anxiety  Depressive disorder  Memory loss  Cerebral embolism with cerebral infarction    Past Surgical History:      Craniotomy  Vulvar wart removal    Family History:    Breast cancer  Prostate cancer  Heart disease   problems    Social History:  Presents with boyfriend   Tobacco use: Former smoker  Alcohol use: Yes (1x per week)  PCP: Physician No Ref-Primary    Marital Status:  Single     Review of Systems   Constitutional: Negative for fever.   Respiratory: Positive for cough.    Cardiovascular: Positive for chest pain.   All other systems reviewed and are negative.    Physical Exam     Patient Vitals for the past 24 hrs:   BP Temp Temp src Heart Rate Resp SpO2 Height Weight   18 1501 115/67 99  F (37.2  C) Oral 78 20 99 % 1.6 m (5' 3\") 61.2 kg (135 lb)      Physical Exam  Nursing notes reviewed. Vitals reviewed.  General: Alert. Well kept.  Eyes:  Conjunctiva non-injected, non-icteric.  Ears:TM s normal.  Neck/Throat: Moist mucous membranes, oropharynx clear without erythema or exudate. No cervical lymphadenopathy.  Normal voice.  Cardiac: Regular rhythm. Normal heart sounds with no murmur/rubs/click.   Pulmonary: Clear and equal breath sounds bilaterally. No crackles/rales. No wheezing  Abdomen: Soft. Non-distended. Non-tender to palpation. No masses. No guarding or rebound.  Musculoskeletal: Normal gross range of motion of all 4 extremities.    Neurological: Alert and oriented x4.   Skin: Warm and dry without rashes or petechiae. Normal appearance of visualized exposed skin.  Psych: Affect normal. Good eye contact.    Emergency Department Course     Imaging:  Radiographic findings were communicated with the patient who voiced understanding of the findings.    XR Chest, 2 views:  IMPRESSION: No acute cardiopulmonary abnormalities.    Imaging independently reviewed " and agree with radiologist interpretation.     Emergency Department Course:  Past medical records, nursing notes, and vitals reviewed.  1638: I performed an exam of the patient and obtained history, as documented above.    The patient was sent for a chest x-ray while in the emergency department, findings above.    1746: I rechecked the patient. Findings and plan explained to the Patient. Patient discharged home with instructions regarding supportive care, medications, and reasons to return. The importance of close follow-up was reviewed.      Impression & Plan      Medical Decision Making:  Suzi Mckoy is a 48 year old female who presents for evaluation of a cough which started with sore throat and congestion with family members having similar symptoms.  This is consistent with an upper respiratory tract infection.  There is no signs at this point of serious bacterial infection such as OM, RPA, epiglottitis, PTA, strep pharyngitis, pneumonia, sinusitis, meningitis, bacteremia, serious bacterial infection. Given patient's concerns and ongoing cough, I did a chest x-ray to evaluate for pneumonia. There are no gastrointestinal symptoms at this point and no signs of dehydration.  She is PERC negative and symptoms are not consistent with PE.  No chest pain and onset of symptoms with viral syndrome making ACS unlikely. Close followup with primary care physician is indicated.  Return to ED for fever, shortness of breath, protracted vomiting, confusion.       Diagnosis:    ICD-10-CM   1. Upper respiratory infection with cough and congestion J06.9     Disposition:  Discharged to home with plan as outlined.    Scribe Disclosure:  I, Jeffrey Hernadez, am serving as a scribe at 4:45 PM on 7/19/2018 to document services personally performed by Suki Landeros CNP based on my observations and the provider's statements to me.   7/19/2018    EMERGENCY DEPARTMENT     Suki Lanedros CNP  07/19/18 2020

## 2018-12-05 ENCOUNTER — OFFICE VISIT (OUTPATIENT)
Dept: OBGYN | Facility: CLINIC | Age: 48
End: 2018-12-05
Payer: MEDICARE

## 2018-12-05 VITALS
HEART RATE: 80 BPM | SYSTOLIC BLOOD PRESSURE: 108 MMHG | WEIGHT: 135 LBS | HEIGHT: 63 IN | BODY MASS INDEX: 23.92 KG/M2 | DIASTOLIC BLOOD PRESSURE: 60 MMHG

## 2018-12-05 DIAGNOSIS — Z30.433 ENCOUNTER FOR IUD REMOVAL AND REINSERTION: Primary | ICD-10-CM

## 2018-12-05 DIAGNOSIS — Z12.4 PAP SMEAR FOR CERVICAL CANCER SCREENING: ICD-10-CM

## 2018-12-05 DIAGNOSIS — Z11.3 ROUTINE SCREENING FOR STI (SEXUALLY TRANSMITTED INFECTION): ICD-10-CM

## 2018-12-05 DIAGNOSIS — Z01.812 PRE-PROCEDURE LAB EXAM: ICD-10-CM

## 2018-12-05 LAB — BETA HCG QUAL IFA URINE: NEGATIVE

## 2018-12-05 PROCEDURE — 87624 HPV HI-RISK TYP POOLED RSLT: CPT | Performed by: NURSE PRACTITIONER

## 2018-12-05 PROCEDURE — G0476 HPV COMBO ASSAY CA SCREEN: HCPCS | Performed by: NURSE PRACTITIONER

## 2018-12-05 PROCEDURE — 58301 REMOVE INTRAUTERINE DEVICE: CPT | Performed by: NURSE PRACTITIONER

## 2018-12-05 PROCEDURE — 87591 N.GONORRHOEAE DNA AMP PROB: CPT | Performed by: NURSE PRACTITIONER

## 2018-12-05 PROCEDURE — G0145 SCR C/V CYTO,THINLAYER,RESCR: HCPCS | Performed by: NURSE PRACTITIONER

## 2018-12-05 PROCEDURE — 87491 CHLMYD TRACH DNA AMP PROBE: CPT | Performed by: NURSE PRACTITIONER

## 2018-12-05 PROCEDURE — 84703 CHORIONIC GONADOTROPIN ASSAY: CPT | Performed by: NURSE PRACTITIONER

## 2018-12-05 PROCEDURE — 58300 INSERT INTRAUTERINE DEVICE: CPT | Performed by: NURSE PRACTITIONER

## 2018-12-05 NOTE — PATIENT INSTRUCTIONS
Your Intrauterine Device (IUD)     What to watch for right after IUD placement:      Some women may experience uterine cramps, bleeding, and/or dizziness during and right after IUD placement.       To help minimize the cramps, you may take ibuprofen 600 mg with food. These symptoms should improve over the next 24 hours.  Mild cramping may be present for a few days after IUD placement. You may continue taking ibuprofen as directed if needed.      You may experience spotting or bleeding for the first few weeks to months after IUD placement.        Other side effects can include:  Anemia, hemorrhage, partial or complete expulsion of device, uterine or cervical perforation, embedding of IUD in the uterine wall, increased risk of pelvic inflammatory disease.  Women who become pregnant with an IUD in place are at higher risk of an ectopic pregnancy.  There is also a higher risk of miscarriage when pregnancy occurs with an IUD in place.      Use condoms or abstain from sex for 7 days after the insertion of your Mirena or Kyleena or Sommer  IUD.  This is not necessary if you had a ParaGard IUD placed.      If you experience fever, chills, abdominal pain, worsening pelvic pain, dizziness, unusually heavy vaginal bleeding, suspected expulsion of device or foul smelling vaginal discharge please call the clinic for evaluation.      Please schedule an appointment at the clinic for a string check 4-6 weeks after IUD placement    Your periods may change (Sommer/Kyleena/Mirena):      For the first 3 to 6 months, your monthly period may become irregular. You may also have frequent spotting or light bleeding. A few women have heavy bleeding during this time. After your body adjusts, the number of bleeding days is likely to decrease (but may remain irregular), and you may even find that your periods stop altogether for as long as Sommer/Mirena is in place.  Your periods will return rapidly once the IUD is removed.      ParaGard IUD  users:      ParaGard IUD users may experience heavier than normal cycles while their IUD is in place, this is considered normal     Back-up contraception is not needed      Checking for your strings:      We encourage everyone with an IUD in place to check for their strings monthly      You may check your own IUD strings by inserting a finger into the vagina and feeling the strings as they exit the cervix.  The strings will initially feel firm, like fishing line, but will soften over a few weeks.  After the strings have softened, you or your partner should not be able to feel the strings during intercourse.       If the string length greatly changes or if you cannot feel your strings at all please make an appointment to see you midwife and use a backup method of contraception like a condom.      If you can feel something hard/plastic like the IUD may not be in the correct place. You should then see your healthcare provider to have the position confirmed with ultrasound.       Remember:    IUD's do not protect against HIV or STIs.  IUD's do not prevent the formation of ovarian cysts.  IUD's do not typically reduce acne or cause weight gain or mood changes.    For more information:  Http://www.mirena-us.com/    The ParaGard IUD is effective for 10 years.  The Sommer IUD is effective for 3 years.  The Kyleena/Mirena IUD is effective for 5 years.  Your IUD can easily be removed by a healthcare professional at any time.    Do not try to remove the IUD yourself.      If you have questions or concerns please call:    [facility]   [phone]    Sexually Transmitted Infections (STIs)    Many STIs do not have any symptoms and can be transmitted through any type of sex, not just intercourse, but also anal, oral, and other types of sex play. Some STIs are transmitted by bacteria and others by viruses. It is important to keep yourself safe and infection free as many STIs have long term side effects.     Common  STIs    Chlamydia  Gonorrhea   Genital Herpes  Genital warts/HPV (some strains of HPV cause cervical cancer)  Hepatitis A, B, & C  Syphilis   Trichomoniasis     Who should be screened?      Screening is available to anyone who wishes to be test, some tests are from a blood draw and some are a vaginal swab    Screening should be done even if people have no symptoms or feel fine    Women who have had unprotected sex with a new partner    Women who have had sex with more than one partner should be screened yearly for gonorrhea and chlamydia     The [facility] also recommends women aged 25 and younger should be screened yearly for gonorrhea and chlamydia    Everyone should be screened at least once for HIV    Pregnant women are screened for STIs at their first OB visit    We can do all the screenings you need right here in clinic    If any screenings come back positive we will get you treated with the appropriate medications. Your partner (s) will also need to be screened and treated by their providers.    How to protect yourself      The most effective way to protect yourself from getting an STI is by using a condom every time that you have sex. (Remember male condoms made out of natural materials do not protect against STIs)    Ask us about vaccines, if you are under the age of 26 we can start a vaccine series for HPV prevention.    If you or your partner have herpes make sure to use a condom or abstain from sexual intercourse/genital contact when you have active lesions. Also avoid oral sex when you or your partner have cold sores    There is no surefire way to prevent all STIs from being transmitted but proper screening and the methods above can help to reduce your risk!    Please ask your midwife at [facility]  if you have any questions

## 2018-12-05 NOTE — PROGRESS NOTES
IUD Removal:  SUBJECTIVE:     Is a pregnancy test required: Yes.  Was it positive or negative?  Negative  Was a consent obtained?  Yes    Suzi Mckoy is a 48 year old female,, No LMP recorded. Patient is not currently having periods (Reason: IUD). who presents today for IUD removal. Her current IUD was placed over 5 years ago. She has not had problems with the IUD. She requests removal of the IUD because the IUD effectiveness has     Today's PHQ-2 Score: No flowsheet data found.    PROCEDURE:    A speculum exam was performed and the cervix was visualized. The IUD string was visualized. Using ring forceps, the string  was grasped and the IUD removed intact.    POST PROCEDURE:    The patient tolerated the procedure well with minimal discomfort. Patient was discharged in stable condition.    MIDWIFE IUD PLACEMENT NOTE    IUD type: Mirena  Lot #: [id number]  NDC#: 78643-429-31    HPI:   Suzi Mckoy is a 48 year old female here today for IUD insertion.  No LMP recorded. Patient is not currently having periods (Reason: IUD)..  Today's pregnancy test - Negative  Patient has not been premedicated with Ibuprofen   Patient did not use Cytotec prior to IUD insertion  STD testing offered? accepted  Patient does not have any infections or cervicitis  Patient does not have history of liver problems or cancer.     Patient has been given written information.  I have reviewed the risks of the IUD including pregnancy, PID, life threatening infection, perforation, expulsion, cramping, changes in bleeding and ovarian cysts. Benefits of the IUD and alternative family planning methods have been discussed.  The probable mechanisms of action were covered, failure rates, spontaneous expulsion, the importance of checking the string monthly, as well as minor or nuisance side effects such as ovarian cysts, migraines, skin changes irregular and unpredictable bleeding in the first 6 months of use and bleeding patterns after  the first 6 months.  The 's printed material was provided for her review.  Patients questions are answered.  Patient has verbalized understanding of risks and benefits and has signed the consent form.  Verification of Procedure:  Before the procedure began, I verified:  Patient Name: Suzi Mckoy  Yes  Patient :  1970  Yes  Procedure to be performed:  Yes    Health maintenance updated:  yes    Allergies   Allergen Reactions     Dilantin [Phenytoin] Unknown     Current Outpatient Prescriptions   Medication Sig Dispense Refill     felbamate (FELBATOL) 600 MG tablet Take 1 tablet (600 mg) by mouth 3 times daily       lamoTRIgine (LAMICTAL) 100 MG tablet Take 2 tablets (200 mg) by mouth at 8 AM and 8 PM and take 1 tablet (100 mg) every afternoon.       levETIRAcetam 1000 MG TABS Take 1,000 mg by mouth 3 times daily       levonorgestrel (MIRENA, 52 MG,) 20 MCG/24HR IUD 1 each (20 mcg) by Intrauterine route once for 1 dose 1 each 0     MIRENA 20 MCG/24HR IU IUD insert per routine 1 0      Past Medical History:   Diagnosis Date     Anxiety      Cerebral embolism with cerebral infarction (H)     left sided deficit after craniotomy     Depressive disorder      Epilepsy complicating pregnancy, childbirth, or the puerperium, unspecified as to episode of care or not applicable(457.93)      Memory loss     secondary to epilepsy     Family History   Problem Relation Age of Onset     Breast Cancer Maternal Grandmother      Cancer Maternal Grandfather      prostate     Heart Disease Father      Genitourinary Problems Father      Social History     Social History     Marital status: Single     Spouse name: N/A     Number of children: N/A     Years of education: N/A     Occupational History     Not on file.     Social History Main Topics     Smoking status: Former Smoker     Smokeless tobacco: Never Used     Alcohol use Yes      Comment: once per week     Drug use: No     Sexual activity: Yes     Partners: Male  "    Birth control/ protection: IUD     Other Topics Concern     Blood Transfusions No     Social History Narrative    Living arrangements - the patient lives with an adult .      Past Surgical History:   Procedure Laterality Date     C  DELIVERY ONLY       CRANIOTOMY  age 19     CRANIOTOMY  age 20     RW LASER WART      vulvar warts       REVIEW OF SYSTEMS:  12 point review of systems negative other than symptoms noted below.  Genitourinary: irregular cycles      EXAM:  /60  Pulse 80  Ht 5' 3\" (1.6 m)  Wt 135 lb (61.2 kg)  Breastfeeding? No  BMI 23.91 kg/m2    PELVIC EXAM:  Vulva: No external lesions, BUS WNL  Vagina: Moist, pink, discharge normal, well rugated, no lesions  Cervix:, smooth, pink, no visible lesions, neg CMT  Uterus: Normal size, retroverted, non-tender, mobile  Ovaries: No mass, non-tender  Rectal exam: deferred      ASSESSMENT/ PLAN:    ICD-10-CM    1. Encounter for IUD removal and reinsertion Z30.433 REMOVE INTRAUTERINE DEVICE     levonorgestrel (MIRENA, 52 MG,) 20 MCG/24HR IUD     INSERTION INTRAUTERINE DEVICE     HC LEVONORGESTREL IU 52MG 5 YR   2. Pap smear for cervical cancer screening Z12.4 Pap imaged thin layer screen with HPV - recommended age 30 - 65 years (select HPV order below)     HPV High Risk Types DNA Cervical   3. Pre-procedure lab exam Z01.812 Beta HCG Qual, Urine - [facility] ([id number])   4. Routine screening for STI (sexually transmitted infection) Z11.3 Chlamydia trachomatis PCR     Neisseria gonorrhoeae PCR     Procedure:  Uterus assessed for position and is retroverted.  Sterile speculum inserted.  Betadine prep of cervix done.  Tenaculum applied at 10 and 2 o'clock.  Uterine sounded to 6 cm's.  Cervical dilators used.   IUD inserted in the usual fashion without difficulty.  Tenaculum removed with scant bleeding from the cervix.  Strings trimmed to 3 cm's.  Patient tolerated the procedure well    Results for orders placed or performed in " visit on 12/05/18   Beta HCG Qual, Urine - [facility] ([id number])   Result Value Ref Range    Beta HCG Qual IFA Urine Negative [facility]^Negative          COUNSELING:  Verbal and written instructions given to patient regarding checking IUD strings.    Reviewed warning signs of fever, sharp/severe abdominal pain, reassured it can be normal to have menstrual like cramping after placement and spotting/light bleeding may last a few weeks after placement.    Reviewed with patient that the IUD needs to be replaced in 5 years, nothing in vagina for 2 days following placement of Mirena or Sommer IUD's.  Use b/u method for one week following IUD placement.    Follow up appointment in 6 to 12 weeks for IUD/string check    Ysabel TRIPLETT CNM

## 2018-12-05 NOTE — PROGRESS NOTES
Results discussed directly with patient while patient was present. Any further details documented in the note.   IOANA Da Silva CNM

## 2018-12-05 NOTE — LETTER
December 17, 2018    Suzi Mckoy  [address]  [place] [address]    Dear Suzi,  We are happy to inform you that your PAP smear result from 12/05/18 is normal.  We are now able to do a follow up test on PAP smears. The DNA test is for HPV (Human Papilloma Virus). Cervical cancer is closely linked with certain types of HPV. Your results showed no evidence of high risk HPV.  Therefore we recommend you return in 5 years for your next pap smear and HPV test.  You will still need to return to the clinic every year for an annual exam and other preventive tests.  If you have additional questions regarding this result, please call our registered nurse, Jodi at [phone].  Sincerely,    IOANA Da Silva CNM/[facility]

## 2018-12-06 LAB
C TRACH DNA SPEC QL NAA+PROBE: NEGATIVE
N GONORRHOEA DNA SPEC QL NAA+PROBE: NEGATIVE
SPECIMEN SOURCE: NORMAL
SPECIMEN SOURCE: NORMAL

## 2018-12-09 LAB
COPATH REPORT: NORMAL
PAP: NORMAL

## 2018-12-11 LAB
FINAL DIAGNOSIS: NORMAL
HPV HR 12 DNA CVX QL NAA+PROBE: NEGATIVE
HPV16 DNA SPEC QL NAA+PROBE: NEGATIVE
HPV18 DNA SPEC QL NAA+PROBE: NEGATIVE
SPECIMEN DESCRIPTION: NORMAL
SPECIMEN SOURCE CVX/VAG CYTO: NORMAL

## 2019-09-18 ENCOUNTER — APPOINTMENT (OUTPATIENT)
Dept: CT IMAGING | Facility: CLINIC | Age: 49
DRG: 101 | End: 2019-09-18
Attending: EMERGENCY MEDICINE
Payer: MEDICARE

## 2019-09-18 ENCOUNTER — APPOINTMENT (OUTPATIENT)
Dept: GENERAL RADIOLOGY | Facility: CLINIC | Age: 49
DRG: 101 | End: 2019-09-18
Attending: EMERGENCY MEDICINE
Payer: MEDICARE

## 2019-09-18 ENCOUNTER — HOSPITAL ENCOUNTER (OUTPATIENT)
Facility: CLINIC | Age: 49
Setting detail: OBSERVATION
Discharge: NURSING, REHAB OR LONG-TERM CARE | DRG: 101 | End: 2019-09-19
Attending: EMERGENCY MEDICINE | Admitting: INTERNAL MEDICINE
Payer: MEDICARE

## 2019-09-18 DIAGNOSIS — Z86.69 HISTORY OF EPILEPSY: ICD-10-CM

## 2019-09-18 DIAGNOSIS — S80.211A ABRASION OF RIGHT KNEE, INITIAL ENCOUNTER: ICD-10-CM

## 2019-09-18 DIAGNOSIS — R52 PAIN: Primary | ICD-10-CM

## 2019-09-18 DIAGNOSIS — S40.011A CONTUSION OF RIGHT SHOULDER, INITIAL ENCOUNTER: ICD-10-CM

## 2019-09-18 LAB
ABO + RH BLD: NORMAL
ABO + RH BLD: NORMAL
ANION GAP SERPL CALCULATED.3IONS-SCNC: 1 MMOL/L (ref 3–14)
B-HCG FREE SERPL-ACNC: <5 IU/L
BASOPHILS # BLD AUTO: 0 10E9/L (ref 0–0.2)
BASOPHILS NFR BLD AUTO: 0.2 %
BLD GP AB SCN SERPL QL: NORMAL
BLOOD BANK CMNT PATIENT-IMP: NORMAL
BUN SERPL-MCNC: 12 MG/DL (ref 7–30)
CALCIUM SERPL-MCNC: 8.7 MG/DL (ref 8.5–10.1)
CHLORIDE SERPL-SCNC: 107 MMOL/L (ref 94–109)
CO2 SERPL-SCNC: 32 MMOL/L (ref 20–32)
CREAT SERPL-MCNC: 0.9 MG/DL (ref 0.52–1.04)
DIFFERENTIAL METHOD BLD: NORMAL
EOSINOPHIL # BLD AUTO: 0.1 10E9/L (ref 0–0.7)
EOSINOPHIL NFR BLD AUTO: 1.3 %
ERYTHROCYTE [DISTWIDTH] IN BLOOD BY AUTOMATED COUNT: 13.1 % (ref 10–15)
GFR SERPL CREATININE-BSD FRML MDRD: 76 ML/MIN/{1.73_M2}
GLUCOSE SERPL-MCNC: 97 MG/DL (ref 70–99)
HCT VFR BLD AUTO: 37.7 % (ref 35–47)
HGB BLD-MCNC: 12.1 G/DL (ref 11.7–15.7)
IMM GRANULOCYTES # BLD: 0 10E9/L (ref 0–0.4)
IMM GRANULOCYTES NFR BLD: 0.1 %
LYMPHOCYTES # BLD AUTO: 1.4 10E9/L (ref 0.8–5.3)
LYMPHOCYTES NFR BLD AUTO: 15.5 %
MCH RBC QN AUTO: 30.4 PG (ref 26.5–33)
MCHC RBC AUTO-ENTMCNC: 32.1 G/DL (ref 31.5–36.5)
MCV RBC AUTO: 95 FL (ref 78–100)
MONOCYTES # BLD AUTO: 0.8 10E9/L (ref 0–1.3)
MONOCYTES NFR BLD AUTO: 8.9 %
NEUTROPHILS # BLD AUTO: 6.7 10E9/L (ref 1.6–8.3)
NEUTROPHILS NFR BLD AUTO: 74 %
NRBC # BLD AUTO: 0 10*3/UL
NRBC BLD AUTO-RTO: 0 /100
PLATELET # BLD AUTO: 314 10E9/L (ref 150–450)
POTASSIUM SERPL-SCNC: 4 MMOL/L (ref 3.4–5.3)
RBC # BLD AUTO: 3.98 10E12/L (ref 3.8–5.2)
SODIUM SERPL-SCNC: 140 MMOL/L (ref 133–144)
SPECIMEN EXP DATE BLD: NORMAL
WBC # BLD AUTO: 9.1 10E9/L (ref 4–11)

## 2019-09-18 PROCEDURE — 80177 DRUG SCRN QUAN LEVETIRACETAM: CPT | Performed by: EMERGENCY MEDICINE

## 2019-09-18 PROCEDURE — 73030 X-RAY EXAM OF SHOULDER: CPT | Mod: RT

## 2019-09-18 PROCEDURE — 86901 BLOOD TYPING SEROLOGIC RH(D): CPT | Performed by: EMERGENCY MEDICINE

## 2019-09-18 PROCEDURE — 86850 RBC ANTIBODY SCREEN: CPT | Performed by: EMERGENCY MEDICINE

## 2019-09-18 PROCEDURE — 72125 CT NECK SPINE W/O DYE: CPT

## 2019-09-18 PROCEDURE — 73110 X-RAY EXAM OF WRIST: CPT | Mod: RT

## 2019-09-18 PROCEDURE — 96374 THER/PROPH/DIAG INJ IV PUSH: CPT | Mod: 59

## 2019-09-18 PROCEDURE — 80048 BASIC METABOLIC PNL TOTAL CA: CPT | Performed by: EMERGENCY MEDICINE

## 2019-09-18 PROCEDURE — 99285 EMERGENCY DEPT VISIT HI MDM: CPT | Mod: 25

## 2019-09-18 PROCEDURE — 84702 CHORIONIC GONADOTROPIN TEST: CPT

## 2019-09-18 PROCEDURE — 80175 DRUG SCREEN QUAN LAMOTRIGINE: CPT | Performed by: EMERGENCY MEDICINE

## 2019-09-18 PROCEDURE — 71260 CT THORAX DX C+: CPT

## 2019-09-18 PROCEDURE — 74177 CT ABD & PELVIS W/CONTRAST: CPT

## 2019-09-18 PROCEDURE — 85025 COMPLETE CBC W/AUTO DIFF WBC: CPT | Performed by: EMERGENCY MEDICINE

## 2019-09-18 PROCEDURE — 25000128 H RX IP 250 OP 636: Performed by: EMERGENCY MEDICINE

## 2019-09-18 PROCEDURE — 73560 X-RAY EXAM OF KNEE 1 OR 2: CPT | Mod: LT

## 2019-09-18 PROCEDURE — 70450 CT HEAD/BRAIN W/O DYE: CPT

## 2019-09-18 PROCEDURE — 86900 BLOOD TYPING SEROLOGIC ABO: CPT | Performed by: EMERGENCY MEDICINE

## 2019-09-18 PROCEDURE — 25000125 ZZHC RX 250: Performed by: EMERGENCY MEDICINE

## 2019-09-18 PROCEDURE — 96361 HYDRATE IV INFUSION ADD-ON: CPT

## 2019-09-18 RX ORDER — LEVETIRACETAM 500 MG/1
1000 TABLET ORAL ONCE
Status: DISCONTINUED | OUTPATIENT
Start: 2019-09-18 | End: 2019-09-19

## 2019-09-18 RX ORDER — FENTANYL CITRATE 50 UG/ML
25 INJECTION, SOLUTION INTRAMUSCULAR; INTRAVENOUS ONCE
Status: COMPLETED | OUTPATIENT
Start: 2019-09-18 | End: 2019-09-18

## 2019-09-18 RX ORDER — FELBAMATE 600 MG/1
600 TABLET ORAL ONCE
Status: DISCONTINUED | OUTPATIENT
Start: 2019-09-18 | End: 2019-09-19

## 2019-09-18 RX ORDER — IOPAMIDOL 755 MG/ML
68 INJECTION, SOLUTION INTRAVASCULAR ONCE
Status: COMPLETED | OUTPATIENT
Start: 2019-09-18 | End: 2019-09-18

## 2019-09-18 RX ADMIN — SODIUM CHLORIDE 60 ML: 9 INJECTION, SOLUTION INTRAVENOUS at 23:41

## 2019-09-18 RX ADMIN — FENTANYL CITRATE 25 MCG: 50 INJECTION INTRAMUSCULAR; INTRAVENOUS at 22:32

## 2019-09-18 RX ADMIN — IOPAMIDOL 68 ML: 755 INJECTION, SOLUTION INTRAVENOUS at 23:41

## 2019-09-18 RX ADMIN — SODIUM CHLORIDE 1000 ML: 9 INJECTION, SOLUTION INTRAVENOUS at 22:32

## 2019-09-18 ASSESSMENT — ENCOUNTER SYMPTOMS
NECK PAIN: 0
ARTHRALGIAS: 1
ABDOMINAL PAIN: 0

## 2019-09-18 ASSESSMENT — MIFFLIN-ST. JEOR: SCORE: 1224.42

## 2019-09-19 ENCOUNTER — APPOINTMENT (OUTPATIENT)
Dept: GENERAL RADIOLOGY | Facility: CLINIC | Age: 49
DRG: 101 | End: 2019-09-19
Attending: EMERGENCY MEDICINE
Payer: MEDICARE

## 2019-09-19 ENCOUNTER — APPOINTMENT (OUTPATIENT)
Dept: PHYSICAL THERAPY | Facility: CLINIC | Age: 49
DRG: 101 | End: 2019-09-19
Attending: INTERNAL MEDICINE
Payer: MEDICARE

## 2019-09-19 VITALS
RESPIRATION RATE: 16 BRPM | TEMPERATURE: 97.7 F | HEART RATE: 82 BPM | HEIGHT: 64 IN | OXYGEN SATURATION: 97 % | SYSTOLIC BLOOD PRESSURE: 107 MMHG | DIASTOLIC BLOOD PRESSURE: 53 MMHG | BODY MASS INDEX: 23.05 KG/M2 | WEIGHT: 135 LBS

## 2019-09-19 PROBLEM — G40.909 SEIZURE DISORDER (H): Status: ACTIVE | Noted: 2019-09-19
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
## 2019-09-19 PROCEDURE — G0378 HOSPITAL OBSERVATION PER HR: HCPCS

## 2019-09-19 PROCEDURE — 25000132 ZZH RX MED GY IP 250 OP 250 PS 637: Mod: GY | Performed by: INTERNAL MEDICINE

## 2019-09-19 PROCEDURE — 73562 X-RAY EXAM OF KNEE 3: CPT | Mod: RT

## 2019-09-19 PROCEDURE — 99219 ZZC INITIAL OBSERVATION CARE,LEVL II: CPT | Performed by: INTERNAL MEDICINE

## 2019-09-19 PROCEDURE — 25000132 ZZH RX MED GY IP 250 OP 250 PS 637: Mod: GY | Performed by: EMERGENCY MEDICINE

## 2019-09-19 PROCEDURE — 97161 PT EVAL LOW COMPLEX 20 MIN: CPT | Mod: GP

## 2019-09-19 RX ORDER — NALOXONE HYDROCHLORIDE 0.4 MG/ML
.1-.4 INJECTION, SOLUTION INTRAMUSCULAR; INTRAVENOUS; SUBCUTANEOUS
Status: DISCONTINUED | OUTPATIENT
Start: 2019-09-19 | End: 2019-09-19 | Stop reason: HOSPADM

## 2019-09-19 RX ORDER — ONDANSETRON 4 MG/1
4 TABLET, ORALLY DISINTEGRATING ORAL EVERY 6 HOURS PRN
Status: DISCONTINUED | OUTPATIENT
Start: 2019-09-19 | End: 2019-09-19 | Stop reason: HOSPADM

## 2019-09-19 RX ORDER — LIDOCAINE 40 MG/G
CREAM TOPICAL
Status: DISCONTINUED | OUTPATIENT
Start: 2019-09-19 | End: 2019-09-19 | Stop reason: HOSPADM

## 2019-09-19 RX ORDER — AMOXICILLIN 250 MG
1 CAPSULE ORAL 2 TIMES DAILY PRN
Status: DISCONTINUED | OUTPATIENT
Start: 2019-09-19 | End: 2019-09-19 | Stop reason: HOSPADM

## 2019-09-19 RX ORDER — LAMOTRIGINE 100 MG/1
200 TABLET ORAL 3 TIMES DAILY
Status: DISCONTINUED | OUTPATIENT
Start: 2019-09-19 | End: 2019-09-19 | Stop reason: HOSPADM

## 2019-09-19 RX ORDER — ONDANSETRON 2 MG/ML
4 INJECTION INTRAMUSCULAR; INTRAVENOUS EVERY 6 HOURS PRN
Status: DISCONTINUED | OUTPATIENT
Start: 2019-09-19 | End: 2019-09-19 | Stop reason: HOSPADM

## 2019-09-19 RX ORDER — HYDROCODONE BITARTRATE AND ACETAMINOPHEN 5; 325 MG/1; MG/1
1 TABLET ORAL EVERY 6 HOURS PRN
Status: DISCONTINUED | OUTPATIENT
Start: 2019-09-19 | End: 2019-09-19 | Stop reason: HOSPADM

## 2019-09-19 RX ORDER — FELBAMATE 600 MG/1
600 TABLET ORAL 3 TIMES DAILY
Status: DISCONTINUED | OUTPATIENT
Start: 2019-09-19 | End: 2019-09-19 | Stop reason: HOSPADM

## 2019-09-19 RX ORDER — ACETAMINOPHEN 325 MG/1
975 TABLET ORAL ONCE
Status: DISCONTINUED | OUTPATIENT
Start: 2019-09-19 | End: 2019-09-19

## 2019-09-19 RX ORDER — ACETAMINOPHEN 325 MG/1
650 TABLET ORAL EVERY 4 HOURS PRN
Status: DISCONTINUED | OUTPATIENT
Start: 2019-09-19 | End: 2019-09-19 | Stop reason: HOSPADM

## 2019-09-19 RX ORDER — AMOXICILLIN 250 MG
2 CAPSULE ORAL 2 TIMES DAILY PRN
Status: DISCONTINUED | OUTPATIENT
Start: 2019-09-19 | End: 2019-09-19 | Stop reason: HOSPADM

## 2019-09-19 RX ORDER — ACETAMINOPHEN 650 MG/1
650 SUPPOSITORY RECTAL EVERY 4 HOURS PRN
Status: DISCONTINUED | OUTPATIENT
Start: 2019-09-19 | End: 2019-09-19 | Stop reason: HOSPADM

## 2019-09-19 RX ORDER — IBUPROFEN 400 MG/1
400 TABLET, FILM COATED ORAL ONCE
Status: COMPLETED | OUTPATIENT
Start: 2019-09-19 | End: 2019-09-19

## 2019-09-19 RX ORDER — ACETAMINOPHEN 325 MG/1
650 TABLET ORAL EVERY 4 HOURS PRN
Start: 2019-09-19 | End: 2019-12-27

## 2019-09-19 RX ORDER — IBUPROFEN 200 MG
200 TABLET ORAL EVERY 4 HOURS PRN
Status: DISCONTINUED | OUTPATIENT
Start: 2019-09-19 | End: 2019-09-19 | Stop reason: HOSPADM

## 2019-09-19 RX ORDER — IBUPROFEN 600 MG/1
600 TABLET, FILM COATED ORAL EVERY 6 HOURS PRN
Status: DISCONTINUED | OUTPATIENT
Start: 2019-09-19 | End: 2019-09-19 | Stop reason: HOSPADM

## 2019-09-19 RX ORDER — LEVETIRACETAM 500 MG/1
1000 TABLET ORAL 3 TIMES DAILY
Status: DISCONTINUED | OUTPATIENT
Start: 2019-09-19 | End: 2019-09-19 | Stop reason: HOSPADM

## 2019-09-19 RX ADMIN — LAMOTRIGINE 200 MG: 100 TABLET ORAL at 15:48

## 2019-09-19 RX ADMIN — LEVETIRACETAM 1000 MG: 500 TABLET ORAL at 15:48

## 2019-09-19 RX ADMIN — FELBAMATE 600 MG: 600 TABLET ORAL at 11:10

## 2019-09-19 RX ADMIN — LEVETIRACETAM 1000 MG: 500 TABLET ORAL at 11:10

## 2019-09-19 RX ADMIN — FELBAMATE 600 MG: 600 TABLET ORAL at 15:48

## 2019-09-19 RX ADMIN — Medication 1 LOZENGE: at 08:31

## 2019-09-19 RX ADMIN — IBUPROFEN 400 MG: 400 TABLET ORAL at 01:10

## 2019-09-19 RX ADMIN — HYDROCODONE BITARTRATE AND ACETAMINOPHEN 1 TABLET: 5; 325 TABLET ORAL at 04:45

## 2019-09-19 RX ADMIN — LAMOTRIGINE 200 MG: 100 TABLET ORAL at 11:10

## 2019-09-19 NOTE — PHARMACY-ADMISSION MEDICATION HISTORY
Admission medication history interview status for the 9/18/2019  admission is complete. See EPIC admission navigator for prior to admission medications     Medication history source reliability:Moderate    Actions taken by pharmacist (provider contacted, etc): Reviewed Surescripts fill history, interviewed patient, called patient's friend     Additional medication history information not noted on PTA med list :   Patient and friend both state that she can only take brand name medications and that generics don't work for her.     Medication reconciliation/reorder completed by provider prior to medication history? No    Time spent in this activity: 45 min    Prior to Admission medications    Medication Sig Last Dose Taking? Auth Provider   felbamate (FELBATOL) 600 MG tablet Take 1 tablet (600 mg) by mouth 3 times daily 9/18/2019 at pm Yes Shante Doshi APRN CNP   lamoTRIgine (LAMICTAL) 100 MG tablet Take 2 tablets (200 mg) by mouth at 8 AM and 8 PM and take 1 tablet (100 mg) every afternoon. 9/18/2019 at pm Yes Shante Doshi APRN CNP   levETIRAcetam (KEPPRA) 500 MG tablet Take 1,000 mg by mouth 3 times daily  9/18/2019 at pm Yes Shante Doshi APRN CNP   MIRENA 20 MCG/24HR IU IUD insert per routine More than a month at Unknown time  Jerson Salgado MD
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
## 2019-09-19 NOTE — PROGRESS NOTES
SPIRITUAL HEALTH SERVICES  Spiritual Assessment Progress Note  [facility] Observation   responded to request for HCD information.  Pt was very sleepy but SH  was able to provide some education in completing the form.  Pt stated that she wanted her friend Hank to be her HC POA. Honoring choices form was left with pt to complete.     Pt was being moved.  No other needs at this time.                                                                                                                                             Elza Hamilton M.Div., [facility]  Staff    Pager [phone]

## 2019-09-19 NOTE — PROGRESS NOTES
RECEIVING UNIT ED HANDOFF REVIEW    ED Nurse Handoff Report was reviewed by: Adilia Boateng RN on September 19, 2019 at 3:28 AM
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
## 2019-09-19 NOTE — PROVIDER NOTIFICATION
MD Notification    Notified Person: MD    Notified Person Name: Kim    Notification Date/Time: 9/19/2019 / 10:27 AM    Notification Interaction: Web paging    Purpose of Notification: requesting MIDOL for pain. wants 1 tab Q4hrs own supply. pharmacy verifying other meds.    Orders Received:    Comments:

## 2019-09-19 NOTE — ED NOTES
0215 Texted pt friend listed in the chart, to come pick her up.  This was done per pt request.   No answer back yet.  Pt is unable to use her hands to text friend but attempted to call him but with no answer.
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
## 2019-09-20 ENCOUNTER — HOSPITAL ENCOUNTER (INPATIENT)
Facility: CLINIC | Age: 49
LOS: 6 days | Discharge: HOME HEALTH | DRG: 101 | End: 2019-09-26
Attending: EMERGENCY MEDICINE | Admitting: HOSPITALIST
Payer: MEDICARE

## 2019-09-20 ENCOUNTER — NURSING HOME VISIT (OUTPATIENT)
Dept: GERIATRICS | Facility: CLINIC | Age: 49
End: 2019-09-20
Payer: MEDICARE

## 2019-09-20 VITALS
TEMPERATURE: 98.3 F | SYSTOLIC BLOOD PRESSURE: 99 MMHG | WEIGHT: 136.7 LBS | HEIGHT: 64 IN | DIASTOLIC BLOOD PRESSURE: 60 MMHG | HEART RATE: 63 BPM | RESPIRATION RATE: 19 BRPM | OXYGEN SATURATION: 98 % | BODY MASS INDEX: 23.34 KG/M2

## 2019-09-20 DIAGNOSIS — F32.A ANXIETY AND DEPRESSION: ICD-10-CM

## 2019-09-20 DIAGNOSIS — Z86.73 HISTORY OF CVA (CEREBROVASCULAR ACCIDENT): ICD-10-CM

## 2019-09-20 DIAGNOSIS — F41.9 ANXIETY AND DEPRESSION: ICD-10-CM

## 2019-09-20 DIAGNOSIS — M25.511 ACUTE PAIN OF RIGHT SHOULDER: ICD-10-CM
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
## 2019-09-20 DIAGNOSIS — R53.81 PHYSICAL DECONDITIONING: ICD-10-CM

## 2019-09-20 DIAGNOSIS — F60.3 BORDERLINE PERSONALITY DISORDER (H): ICD-10-CM

## 2019-09-20 DIAGNOSIS — G40.909 SEIZURE DISORDER (H): ICD-10-CM

## 2019-09-20 DIAGNOSIS — F31.9 BIPOLAR AFFECTIVE DISORDER, REMISSION STATUS UNSPECIFIED (H): ICD-10-CM

## 2019-09-20 PROBLEM — G40.919 BREAKTHROUGH SEIZURE (H): Status: ACTIVE | Noted: 2019-09-20

## 2019-09-20 LAB
ANION GAP SERPL CALCULATED.3IONS-SCNC: 1 MMOL/L (ref 3–14)
BASOPHILS # BLD AUTO: 0 10E9/L (ref 0–0.2)
BASOPHILS NFR BLD AUTO: 0.4 %
BUN SERPL-MCNC: 11 MG/DL (ref 7–30)
CALCIUM SERPL-MCNC: 8.4 MG/DL (ref 8.5–10.1)
CHLORIDE SERPL-SCNC: 105 MMOL/L (ref 94–109)
CO2 SERPL-SCNC: 30 MMOL/L (ref 20–32)
CREAT SERPL-MCNC: 0.81 MG/DL (ref 0.52–1.04)
DIFFERENTIAL METHOD BLD: NORMAL
EOSINOPHIL # BLD AUTO: 0.3 10E9/L (ref 0–0.7)
EOSINOPHIL NFR BLD AUTO: 4.8 %
ERYTHROCYTE [DISTWIDTH] IN BLOOD BY AUTOMATED COUNT: 13 % (ref 10–15)
GFR SERPL CREATININE-BSD FRML MDRD: 86 ML/MIN/{1.73_M2}
GLUCOSE BLDC GLUCOMTR-MCNC: 112 MG/DL (ref 70–99)
GLUCOSE SERPL-MCNC: 89 MG/DL (ref 70–99)
HCT VFR BLD AUTO: 38.5 % (ref 35–47)
HGB BLD-MCNC: 12.5 G/DL (ref 11.7–15.7)
IMM GRANULOCYTES # BLD: 0 10E9/L (ref 0–0.4)
IMM GRANULOCYTES NFR BLD: 0 %
LAMOTRIGINE SERPL-MCNC: 11 UG/ML (ref 2.5–15)
LEVETIRACETAM SERPL-MCNC: 42 UG/ML (ref 12–46)
LYMPHOCYTES # BLD AUTO: 1.3 10E9/L (ref 0.8–5.3)
LYMPHOCYTES NFR BLD AUTO: 23.2 %
MCH RBC QN AUTO: 30.5 PG (ref 26.5–33)
MCHC RBC AUTO-ENTMCNC: 32.5 G/DL (ref 31.5–36.5)
MCV RBC AUTO: 94 FL (ref 78–100)
MONOCYTES # BLD AUTO: 0.5 10E9/L (ref 0–1.3)
MONOCYTES NFR BLD AUTO: 9.3 %
NEUTROPHILS # BLD AUTO: 3.5 10E9/L (ref 1.6–8.3)
NEUTROPHILS NFR BLD AUTO: 62.3 %
NRBC # BLD AUTO: 0 10*3/UL
NRBC BLD AUTO-RTO: 0 /100
PLATELET # BLD AUTO: 319 10E9/L (ref 150–450)
POTASSIUM SERPL-SCNC: 4.2 MMOL/L (ref 3.4–5.3)
RBC # BLD AUTO: 4.1 10E12/L (ref 3.8–5.2)
SODIUM SERPL-SCNC: 136 MMOL/L (ref 133–144)
WBC # BLD AUTO: 5.6 10E9/L (ref 4–11)

## 2019-09-20 PROCEDURE — 85025 COMPLETE CBC W/AUTO DIFF WBC: CPT | Performed by: EMERGENCY MEDICINE

## 2019-09-20 PROCEDURE — 00000146 ZZHCL STATISTIC GLUCOSE BY METER IP

## 2019-09-20 PROCEDURE — 80048 BASIC METABOLIC PNL TOTAL CA: CPT | Performed by: EMERGENCY MEDICINE

## 2019-09-20 PROCEDURE — 12000000 ZZH R&B MED SURG/OB

## 2019-09-20 PROCEDURE — 99285 EMERGENCY DEPT VISIT HI MDM: CPT

## 2019-09-20 PROCEDURE — 99233 SBSQ HOSP IP/OBS HIGH 50: CPT | Performed by: HOSPITALIST

## 2019-09-20 PROCEDURE — 99310 SBSQ NF CARE HIGH MDM 45: CPT | Performed by: NURSE PRACTITIONER

## 2019-09-20 PROCEDURE — 99207 ZZC CDG-HISTORY COMP: MEETS EXP. PROBLEM FOCUSED-DOWN CODED LACK OF ROS: CPT | Performed by: HOSPITALIST

## 2019-09-20 RX ORDER — LAMOTRIGINE 100 MG/1
100 TABLET ORAL DAILY
COMMUNITY
End: 2019-12-04

## 2019-09-20 RX ORDER — LIDOCAINE 40 MG/G
CREAM TOPICAL
Status: DISCONTINUED | OUTPATIENT
Start: 2019-09-20 | End: 2019-09-26 | Stop reason: HOSPADM

## 2019-09-20 RX ORDER — LAMOTRIGINE 100 MG/1
100 TABLET ORAL DAILY
Status: DISCONTINUED | OUTPATIENT
Start: 2019-09-21 | End: 2019-09-26 | Stop reason: HOSPADM

## 2019-09-20 RX ORDER — LEVETIRACETAM 500 MG/1
1000 TABLET ORAL 3 TIMES DAILY
Status: DISCONTINUED | OUTPATIENT
Start: 2019-09-20 | End: 2019-09-20

## 2019-09-20 RX ORDER — NALOXONE HYDROCHLORIDE 0.4 MG/ML
.1-.4 INJECTION, SOLUTION INTRAMUSCULAR; INTRAVENOUS; SUBCUTANEOUS
Status: DISCONTINUED | OUTPATIENT
Start: 2019-09-20 | End: 2019-09-26 | Stop reason: HOSPADM

## 2019-09-20 RX ORDER — FELBAMATE 600 MG/1
600 TABLET ORAL 3 TIMES DAILY
Status: DISCONTINUED | OUTPATIENT
Start: 2019-09-20 | End: 2019-09-20

## 2019-09-20 RX ORDER — LAMOTRIGINE 100 MG/1
200 TABLET ORAL 2 TIMES DAILY
Status: DISCONTINUED | OUTPATIENT
Start: 2019-09-20 | End: 2019-09-20

## 2019-09-20 RX ORDER — ONDANSETRON 4 MG/1
4 TABLET, ORALLY DISINTEGRATING ORAL EVERY 6 HOURS PRN
Status: DISCONTINUED | OUTPATIENT
Start: 2019-09-20 | End: 2019-09-26 | Stop reason: HOSPADM

## 2019-09-20 RX ORDER — LORAZEPAM 2 MG/ML
1 INJECTION INTRAMUSCULAR ONCE
Status: DISCONTINUED | OUTPATIENT
Start: 2019-09-20 | End: 2019-09-20

## 2019-09-20 RX ORDER — ACETAMINOPHEN 325 MG/1
650 TABLET ORAL EVERY 4 HOURS PRN
Status: DISCONTINUED | OUTPATIENT
Start: 2019-09-20 | End: 2019-09-26 | Stop reason: HOSPADM

## 2019-09-20 RX ORDER — FELBAMATE 600 MG/1
600 TABLET ORAL 3 TIMES DAILY
Status: DISCONTINUED | OUTPATIENT
Start: 2019-09-21 | End: 2019-09-26 | Stop reason: HOSPADM

## 2019-09-20 RX ORDER — LEVETIRACETAM 10 MG/ML
1000 INJECTION INTRAVASCULAR ONCE
Status: DISCONTINUED | OUTPATIENT
Start: 2019-09-20 | End: 2019-09-20

## 2019-09-20 RX ORDER — LEVETIRACETAM 500 MG/1
1000 TABLET ORAL 3 TIMES DAILY
Status: DISCONTINUED | OUTPATIENT
Start: 2019-09-21 | End: 2019-09-26 | Stop reason: HOSPADM

## 2019-09-20 RX ORDER — ACETAMINOPHEN 325 MG/1
650 TABLET ORAL EVERY 4 HOURS PRN
Status: DISCONTINUED | OUTPATIENT
Start: 2019-09-20 | End: 2019-09-20

## 2019-09-20 RX ORDER — ONDANSETRON 2 MG/ML
4 INJECTION INTRAMUSCULAR; INTRAVENOUS EVERY 6 HOURS PRN
Status: DISCONTINUED | OUTPATIENT
Start: 2019-09-20 | End: 2019-09-26 | Stop reason: HOSPADM

## 2019-09-20 RX ORDER — LAMOTRIGINE 100 MG/1
200 TABLET ORAL 2 TIMES DAILY
Status: DISCONTINUED | OUTPATIENT
Start: 2019-09-21 | End: 2019-09-26 | Stop reason: HOSPADM

## 2019-09-20 RX ADMIN — LAMOTRIGINE 200 MG: 100 TABLET ORAL at 23:55

## 2019-09-20 RX ADMIN — FELBAMATE 600 MG: 600 TABLET ORAL at 23:54

## 2019-09-20 RX ADMIN — LEVETIRACETAM 1000 MG: 500 TABLET ORAL at 23:54

## 2019-09-20 ASSESSMENT — ENCOUNTER SYMPTOMS
NECK PAIN: 1
CHILLS: 0
MYALGIAS: 1
SEIZURES: 1
FEVER: 0
JOINT SWELLING: 1

## 2019-09-20 ASSESSMENT — MIFFLIN-ST. JEOR
SCORE: 1255.04
SCORE: 1232.13
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
## 2019-09-20 NOTE — ED NOTES
Bed: ED18  Expected date:   Expected time:   Means of arrival:   Comments:  [facility] - 514 - 49F ?seizure eta 1319

## 2019-09-20 NOTE — ED NOTES
Patient given own medication bottles to self administer anti seizure medications per ED providers request.
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
## 2019-09-20 NOTE — PROGRESS NOTES
[place] [facility]  PRIMARY CARE PROVIDER AND CLINIC:  Physician No Ref-Primary, No address on file  Chief Complaint   Patient presents with     Hospital F/U     [place] Medical Record Number:  [id number]  Place of Service where encounter took place:  [facility] ([facility]) [[id number]]    Suzi Mckoy  is a 49 year old  (1970), admitted to the above facility from  [facility]. Hospital stay 19 through 19..  Admitted to this facility for  rehab, medical management and nursing care.    HPI:    HPI information obtained from: {[facility] HPI:780437}.   Brief Summary of Hospital Course: ***  Updates on Status Since Skilled nursing Admission: ***    CODE STATUS/ADVANCE DIRECTIVES DISCUSSION:   CPR/Full code   Patient's living condition: lives alone  ALLERGIES: Dilantin [phenytoin]  PAST MEDICAL HISTORY:  has a past medical history of Anxiety, Cerebral embolism with cerebral infarction (H), Depressive disorder, Epilepsy complicating pregnancy, childbirth, or the puerperium, unspecified as to episode of care or not applicable(649.40), and Memory loss.  PAST SURGICAL HISTORY:   has a past surgical history that includes craniotomy (age 19); craniotomy (age 20); rw laser wart; and  DELIVERY ONLY.  FAMILY HISTORY: family history includes Breast Cancer in her maternal grandmother; Cancer in her maternal grandfather; Genitourinary Problems in her father; Heart Disease in her father.  SOCIAL HISTORY:   reports that she has quit smoking. She has never used smokeless tobacco. She reports that she drinks alcohol. She reports that she does not use drugs.    Post Discharge Medication Reconciliation Status: {ACO Med Rec (Provider):798462}  ***  Current Outpatient Medications   Medication Sig Dispense Refill     acetaminophen (TYLENOL) 325 MG tablet Take 2 tablets (650 mg) by mouth every 4 hours as needed for mild pain       felbamate (FELBATOL) 600 MG tablet Take 1 tablet  "(600 mg) by mouth 3 times daily       lamoTRIgine (LAMICTAL) 100 MG tablet Take 2 tablets (200 mg) by mouth at 8 AM and 8 PM and take 1 tablet (100 mg) every afternoon.       levETIRAcetam (KEPPRA) 500 MG tablet Take 1,000 mg by mouth 3 times daily        MIRENA 20 MCG/24HR IU IUD insert per routine 1 0     ***  ROS:  {ROS [facility]:101920}    Vitals:  BP 99/60   Pulse 63   Temp 98.3  F (36.8  C)   Resp 19   Ht 1.613 m (5' 3.5\")   Wt 62 kg (136 lb 11.2 oz)   SpO2 98%   BMI 23.84 kg/m    Exam:  {Nursing home physical exam :171588}    Lab/Diagnostic data:  {fgslab:094741}    ASSESSMENT/PLAN:  {[facility] DX INITIAL:329620}    {fgsorders:193048}  ***    Total time spent with patient visit at the skilled nursing facility was {1/2/3/4/5:381685} including {1/2/3/4/5:404766}. Greater than 50% of total time spent with counseling and coordinating care due to {fgscc50%:547581}.    Electronically signed by:  Michelle Freeman ***  {Providers Please double check the med list (in the plan section >> meds & orders tab) and Discontinue any of the meds flagged by the TC to be discontinued}                  "

## 2019-09-20 NOTE — PROGRESS NOTES
I was asked to see if this patient would be able to return to [facility] and if they could get her the brand name Keppra which she has ordered thru the [facility] [address].  I called Debi at [facility] and they are unable to take this patient back as she gave up her bed hold and told the staff that she didn't want to come back there. This patient took all of her belongings with her when she left [facility].  Debi said they recommended psych eval as this patient raised her bed to the highest position and tried to throw herself off the bed. I did inform the ER provider of the above and will wait for further directions as to how I can assist.

## 2019-09-21 PROCEDURE — 99221 1ST HOSP IP/OBS SF/LOW 40: CPT | Performed by: PSYCHIATRY & NEUROLOGY

## 2019-09-21 PROCEDURE — 12000000 ZZH R&B MED SURG/OB

## 2019-09-21 PROCEDURE — 99233 SBSQ HOSP IP/OBS HIGH 50: CPT | Performed by: INTERNAL MEDICINE

## 2019-09-21 RX ORDER — LORAZEPAM 2 MG/ML
2 INJECTION INTRAMUSCULAR
Status: DISCONTINUED | OUTPATIENT
Start: 2019-09-21 | End: 2019-09-26 | Stop reason: HOSPADM

## 2019-09-21 RX ADMIN — FELBAMATE 600 MG: 600 TABLET ORAL at 13:21

## 2019-09-21 RX ADMIN — LEVETIRACETAM 1000 MG: 500 TABLET ORAL at 13:22

## 2019-09-21 RX ADMIN — LEVETIRACETAM 1000 MG: 500 TABLET ORAL at 21:11

## 2019-09-21 RX ADMIN — LAMOTRIGINE 200 MG: 100 TABLET ORAL at 13:21

## 2019-09-21 RX ADMIN — LAMOTRIGINE 100 MG: 100 TABLET ORAL at 21:11

## 2019-09-21 RX ADMIN — FELBAMATE 600 MG: 600 TABLET ORAL at 21:11

## 2019-09-21 ASSESSMENT — ACTIVITIES OF DAILY LIVING (ADL)
ADLS_ACUITY_SCORE: 11
ADLS_ACUITY_SCORE: 12
ADLS_ACUITY_SCORE: 11
ADLS_ACUITY_SCORE: 11
ADLS_ACUITY_SCORE: 13
ADLS_ACUITY_SCORE: 13

## 2019-09-21 NOTE — PROGRESS NOTES
September 20, 2019  Patient refuses to take hospital meds, she will only use her supply. Per hospitalist, patient is allowed to use her own home meds including lamotrigine, Keppra & felbamate. These are all labeled and in patient's room (she refuses putting them in her cubby). Patient only had two doses left of lamotrigine. She states that she thinks her retail pharmacy will deliver these to her in the hospital. She plans to call them in the morning, otherwise, she will need to arrange . (Pharmacy is [facility]).    Lianna Pacheco PharmD
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
## 2019-09-21 NOTE — ED NOTES
Patient used call light, RN to room observed patient laying on floor next to ED cart.  Patient disoriented x 4, eyes with right upward gaze and urinary incontinence noted.  ED provider called and arrived to bedside.  Patient assisted back to ED cart by this RN and EDT;  VS and BSG obtained. IV accessed obtained.  Dr. Rm assess patient and denies need for a C-collar and/or imaging to clear c-spine.    Dr. Gomes,  arrives at bedside speaking with patient's boyfriend on the telephone.  Patient refuses ordered IV Ativan and IV Keppra.  Plan to admit patient inpatient neuro and have patient take her own medication.    Dr. Rm will sign patient's care out to ED provider Dr. Her until patient is admitted.

## 2019-09-21 NOTE — ED NOTES
Pt requested to get money out of her purse (for her friend who is on the way) which was in her locked cubby in the room.  I watched the pt removed 15 dollars out of her wallet.  The pt is holding on to the money at this time.

## 2019-09-21 NOTE — PLAN OF CARE
Breakthrough seizures.  Neuros/CMS - refusing to cooperative with assessment/fowl language toward staff/patient agitated that writer not giving her meds from her pills boxes/refusing to take meds from her prescription bottles that where labeled by pharmacy overnight; appears to have left sided deficits/memory issues; left side appears weak;uncooperative most of shift - patient wanted to sleep this am and be left alone. VSS when able to check. Consumed a regular meal at approximately 1500 from kitchen.  Takes pills whole with water. Up with 2 assist/gait belt/transfer pivot on right side to bedside commode.  Voided x 1 at 1520/ appears to be adequate amounts.  C/o of pain all over/declining pain medications.  Patient encouraged to ankle pump & deep breath/refusing pcds/refusing seizure pads on lower rails of bed.  Seizure pad maintained on 2 top side rails/no seizure activity noted. Awaiting recommendations from psyc MD and neurology on how to proceed. Left hand skin tear covered/dry.

## 2019-09-21 NOTE — ED NOTES
Patient is A&ox4, respirations even and unlabored, skin dry, warm, color wnl. Seizure pads on rails for pt safety. Patient had refused her IV meds keppra returned and ativan waisted.

## 2019-09-21 NOTE — ED NOTES
Patient took her own medication Felbatol 600mg 1 tablet, Keppra 500mg 2 tablets; observed by ED provider.

## 2019-09-21 NOTE — PROGRESS NOTES
"Writer at bedside with patient am meds.  Prescription bottles for keppra, lamictal and felbatol were labeled by pharmacy overnight.  Patient wants to take her meds from her pill box and not from the labeled pill bottles from pharmacy.  Explained to patient that this was against hospital policy and that I cannot give her meds from her pill box.  Therefore at this time, patient choosing to not take her meds at this time. \"I don't trust a damn thing you do\"  \"I have the right to refuse\".  Will update MDS involved in patient care.  Psychiatry MD and Neurology MD alerted and made aware. Pharmacy contacted to help identify process of administration of home meds. Preparing to send patient's pill box to hospital safe. Patient's prescription bottles locked back in medication cabinet for safety and patient carefully monitored for seizure activity.  "
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
## 2019-09-21 NOTE — PROVIDER NOTIFICATION
"Paged Hospitalist re: patient response to staff. \" leave me alone leave me alone\" \"get the f-away\" stating wants us to go away and left alone until 10 am. Will await a call back.  "

## 2019-09-21 NOTE — PROVIDER NOTIFICATION
Paged Hospitalist re: no orders for pysc or neuro consult as indicated in ED, no order to take own anti-seizure meds from home as discussed overnight with MD and night nurse.  Apparently there are a few bottles labeled by pharmacy on window sill and a suitcase full of others on window sill.  Will await any additional orders.  Charge RN aware and will speak with ANS at bed meeting.
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
## 2019-09-21 NOTE — PROVIDER NOTIFICATION
Called hospital ist regarding patient want IV pulled out, RN explained the reason and risk of not having an IV but per patient she will leave the hospital if we wont take it out hospitalist is ok  to take out .when RN in the room to take out patient already to pulled out IV. Writer also clarified with house MD that patient took evening medication late and night time medication already due per house MD its ok to give night dose around midnight.

## 2019-09-22 ENCOUNTER — APPOINTMENT (OUTPATIENT)
Dept: OCCUPATIONAL THERAPY | Facility: CLINIC | Age: 49
DRG: 101 | End: 2019-09-22
Payer: MEDICARE

## 2019-09-22 PROCEDURE — 97535 SELF CARE MNGMENT TRAINING: CPT | Mod: GO

## 2019-09-22 PROCEDURE — 25000132 ZZH RX MED GY IP 250 OP 250 PS 637: Mod: GY | Performed by: INTERNAL MEDICINE

## 2019-09-22 PROCEDURE — 99232 SBSQ HOSP IP/OBS MODERATE 35: CPT | Performed by: INTERNAL MEDICINE

## 2019-09-22 PROCEDURE — 97165 OT EVAL LOW COMPLEX 30 MIN: CPT | Mod: GO

## 2019-09-22 PROCEDURE — 99233 SBSQ HOSP IP/OBS HIGH 50: CPT | Performed by: PSYCHIATRY & NEUROLOGY

## 2019-09-22 PROCEDURE — 12000000 ZZH R&B MED SURG/OB

## 2019-09-22 RX ORDER — DIPHENHYDRAMINE HCL 25 MG
50 CAPSULE ORAL EVERY 6 HOURS PRN
Status: DISCONTINUED | OUTPATIENT
Start: 2019-09-22 | End: 2019-09-23

## 2019-09-22 RX ORDER — HALOPERIDOL 5 MG/1
5 TABLET ORAL EVERY 6 HOURS PRN
Status: DISCONTINUED | OUTPATIENT
Start: 2019-09-22 | End: 2019-09-26 | Stop reason: HOSPADM

## 2019-09-22 RX ORDER — LORAZEPAM 2 MG/ML
2 INJECTION INTRAMUSCULAR EVERY 6 HOURS PRN
Status: DISCONTINUED | OUTPATIENT
Start: 2019-09-22 | End: 2019-09-26 | Stop reason: HOSPADM

## 2019-09-22 RX ORDER — DIPHENHYDRAMINE HYDROCHLORIDE 50 MG/ML
50 INJECTION INTRAMUSCULAR; INTRAVENOUS EVERY 6 HOURS PRN
Status: DISCONTINUED | OUTPATIENT
Start: 2019-09-22 | End: 2019-09-26 | Stop reason: HOSPADM

## 2019-09-22 RX ORDER — LORAZEPAM 1 MG/1
2 TABLET ORAL EVERY 6 HOURS PRN
Status: DISCONTINUED | OUTPATIENT
Start: 2019-09-22 | End: 2019-09-26 | Stop reason: HOSPADM

## 2019-09-22 RX ORDER — HALOPERIDOL 5 MG/ML
5 INJECTION INTRAMUSCULAR EVERY 6 HOURS PRN
Status: DISCONTINUED | OUTPATIENT
Start: 2019-09-22 | End: 2019-09-26 | Stop reason: HOSPADM

## 2019-09-22 RX ADMIN — LEVETIRACETAM 1000 MG: 500 TABLET ORAL at 20:22

## 2019-09-22 RX ADMIN — FELBAMATE 600 MG: 600 TABLET ORAL at 20:23

## 2019-09-22 RX ADMIN — LEVETIRACETAM 1000 MG: 500 TABLET ORAL at 03:44

## 2019-09-22 RX ADMIN — FELBAMATE 600 MG: 600 TABLET ORAL at 03:30

## 2019-09-22 RX ADMIN — Medication 1 LOZENGE: at 00:32

## 2019-09-22 RX ADMIN — FELBAMATE 600 MG: 600 TABLET ORAL at 16:21

## 2019-09-22 RX ADMIN — LAMOTRIGINE 200 MG: 100 TABLET ORAL at 20:23

## 2019-09-22 RX ADMIN — FELBAMATE 600 MG: 600 TABLET ORAL at 10:13

## 2019-09-22 RX ADMIN — LAMOTRIGINE 200 MG: 100 TABLET ORAL at 10:13

## 2019-09-22 RX ADMIN — LEVETIRACETAM 1000 MG: 500 TABLET ORAL at 10:11

## 2019-09-22 RX ADMIN — LAMOTRIGINE 200 MG: 100 TABLET ORAL at 03:44

## 2019-09-22 RX ADMIN — LAMOTRIGINE 100 MG: 100 TABLET ORAL at 16:21

## 2019-09-22 RX ADMIN — LEVETIRACETAM 1000 MG: 500 TABLET ORAL at 16:20

## 2019-09-22 ASSESSMENT — ACTIVITIES OF DAILY LIVING (ADL)
ADLS_ACUITY_SCORE: 14
ADLS_ACUITY_SCORE: 13
ADLS_ACUITY_SCORE: 14
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
## 2019-09-22 NOTE — PROVIDER NOTIFICATION
"Paged Dr. Meade, \"pharmacy & bedside RN came in with [facility] pharmacy medications that were couriered & discussed labeling & giving these moving forward. As staff was leaving room, pt upset & threw her basin of water. Please advise.\"    Dr. Meade said to call psych for the PRN medications he recommended. Pt is calm now.     ADDENDUM: Called & left message with psych Dr. Solomon to call back. Per Dr. Solomon note recommending PRN psych meds if behavior escalating. Calling to see if they can be ordered PRN.     ADDENDUM: TORB per Dr. Anoop Solomon: IM & PO haldol 5mg q6h PRN for agitation, ativan 2mg IM &PO q6h PRN for agitation, PO & IM benadryl 50mg q6h PRN for agitation. Called Dr. Solomon again & discussed at 1515 regarding order of the PRNs or if all should be given. Per Dr. Solomon \"In emergency or for agitation ok to give benadryl, haldol, & ativan together IM or PO. If pt receives haldol, she needs to be given benadryl as well.\" Pt care order placed & put a MAR note to review pt care order for psych meds.     Plan for long arm sitter at 1500. Will get VPM placed for staff safety once brought up. Plan to have security on hand when discussing AED medications at the scheduled MAR times. Updated the ANS.   "

## 2019-09-22 NOTE — PROGRESS NOTES
"Pt unwilling to take her Brand Name AEDs that are in pts pill bottle verified & labeled by pharmacy. Pt is adamant taking medications for her home Sunday through Saturday pillbox unlabeled. Neurologist concerned for pt going into status epilepticus & requesting pt to have her AED from her pillbox verified by pharmacy at bedside & have RN administer. Called [facility] regarding situation to confirm policy/protocol. ADMIN was contacted & spoke to neurologist, Dr. Hernandez. Per our policy \"medications will be given to the pharmacist for proper identification, verification of product integrity and labeling\". Since pt is refusing medications from labeled pill bottle pt can't take from her unlabeled pillbox. Will encourage & reinforce importance of taking AED medications. Pt has right to refuse. Long arm sitter placed for safety. Evening pharmacist, Dr. David Vitale (Neuro), José (ANS), & bedside RN notified.     Pt refusing IV access. At 1715 Dr. Meade ordered IM PRN ativan available for seizures greater than 3 mins.   "

## 2019-09-22 NOTE — PROGRESS NOTES
Medications labeled by pharmacy & pillbox placed in locked bin on nursing station. Pt identifier placed on pillbox. Bags on window sill placed in closet. Pt on zone 2.

## 2019-09-22 NOTE — TREATMENT PLAN
Treatment Plan    Patient continues to display delusional paranoia and is not cooperative with caresalberta basin as staff leaving room.     Psychiatry recommending inpatient pscyhiatric stabilization once she is cleared from a neurologic perspective.  If she does not take her anti-seizure medications, she is at risk for having a seizure, so she should remain on neuro floor tonight to ensure she will take her medications under hospital protocol, and if continues to refuse her medications, she needs to be closely monitored for seizure with seizure precautions re-instituded.   I will try to obtain outside neurology records and speak with neurologist tomorrow, Monday to assess her risk of having a seizure if she continues to refuse her AEDs  Nursing staff is going to arrange having security on hand when entering room.     If patient becomes behaviorally threatening, psychiatry has ordered Haldol 5 mg IM, Benadryl 50 mg IM, and Ativan 2 mg IM (RN to clarify with psychiatry if he recommends giving all three.)     If she has a seizure lasting over 3 minutes, I have ordered ativan 2 mg IM.

## 2019-09-22 NOTE — PROGRESS NOTES
Per policy pt can take home pills from verified labeled pill bottle by pharmacy. Pt can not take from her Sunday through Saturday pill box. Pt has been paranoid with taking her home AED pills that were verified/labeled by pharmacy. Will continue to encourage to take AED medications. Pt has right to refuse. No IV access. IM PRN ativan available for seizures greater than 3 mins. José, [facility] aware. Will update bedside RN.     Sending pillbox down to security.     ADDENDUM 1030: Pt took pills from the labeled pill bottle. Karson Guevara called [facility] pharmacy were there are two prescriptions. Waiting for  to  her prescriptions.     ADDENDUM: 1330: AED medications arrived via . [facility] Pharmacist & bedside RN discussing with patient regarding medications & plan to label them.
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
## 2019-09-22 NOTE — PLAN OF CARE
"Pt here with breakthrough seizure. A&O. Neuros shows residual deficits from \"old stroke\", left weakness, left sensory loss, left droop. VSS. Reg diet, thin liquids. Takes pills whole. Up with 2A, pivot to BSC. HA but declining pain intervention. Pt scoring yellow on the Aggression Stop Light Tool, she has some paranoia about where her pills are dispensed from, safe administration completed with RN and RPH and pt agreeable to these interventions. Plan: pt would like to see therapies, sticky note left for am. Discharge location/date TBD. Pt with minimal requests, she is watching movies from bed, she has her doll with her, she refers to it as her child, but states \"most people think he's real\". Using call light appropriately. No seizure activity noted this shift.     "

## 2019-09-22 NOTE — PLAN OF CARE
Discharge Planner OT   Patient plan for discharge: Pt states she wants to return home  Current status: OT orders received and chart reviewed; eval completed and treatment initiated. Pt presents with unsteadiness with functional mobility and during ADLs. Pt does demo some self-limiting behaviors, stating she is unable to complete a task; however when asked and provided with guidance and supervision is able to complete better than she expected. Pt completed LE dressing while seated with min A and extended time. Pt demo one LOB backward when lifting hands off walker at sink while washings hands, OT provided min assist to steady. Pt ambulated down all hallways on unit with no LOB using walker and SBA. Plan to see for 1-2 sessions while in acute care in order to maximize safety and independence with ADLs and functional mobility. Pt's main needs at this time appear to be psychiatric stabilization, per  plan is likely to go to inpatient behavioral health when discharged from acute care. From OT standpoint pt safe to do so, do not feel TCU (where pt came from) is appropriate at discharge as pt does not have level of need.   Barriers to return to prior living situation: Psychiatric needs  Recommendations for discharge: Inpatient mental health (defer to care team)  Rationale for recommendations: Pt's main needs at this time are psychiatric. Pt will benefit from skilled OT while in acute care in order to maximize safety and independence with ADLs and functional mobility with use of walker. Anticipate safe discharge to inpatient mental health when medically ready.        Entered by: Jennifer Howard 09/22/2019 3:40 PM

## 2019-09-22 NOTE — PROGRESS NOTES
CM    I: SW was requested by Pharmacy and ANS to assist with the transporting of patient's medications from [facility] in [place] to [facility]. SW suggested to ANS that [facility]  service ([facility]) be utilized, which was agreed upon. SW confirmed with dispatch at [facility] of the proposed plan, which they accepted. ROLANDO then spoke with Nikko Pharmacist [facility] [facility] who requested pt sign a statement which would allow them to release medications to  . Statement was created on [facility] letterhead, signed by patient and faxed to: [phone]. Statement was placed on front of patient's chart.     P: No further SW interventions anticipated at this time.    INGRID Ferrera

## 2019-09-22 NOTE — PLAN OF CARE
Breakthru seizures.  A&O. Neuros/CMS - directing her care, do not wake before 10, continues to demand her filled pill box for med administration/those meds sent to hospital safe/ reinforced policy to give from prescription bottles as marked by pharmacy at [facility]. VSS. Regular diet/fair appetite/takes pills whole. Up with 1-2 assist/gait belt to bedside commode/tolerated up in chair for half of the shift.  Encouraged to self care/refusing help from staff/demanding.  Flung entire basin of dirty bath water off her tray table in disgust after conversation with writer and pharmacist re: med administration.  C/o back discomfort - declining pain meds/warm packs helpful.  Pt scoring yellow on the Aggression Stop Light Tool.  Seizure precautions maintained/no seizure activity noted.
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
## 2019-09-22 NOTE — PLAN OF CARE
Patient here with breakthrough seizures non compliance with medication.VSS Neuro's intact except Lt hemiparesis,Numbness tingling on Lt side and  Mild Lt droop( from previous stroke).On Regular diet.c/o mild  HA  managed with ice packs Declined Tylenol Up with one to two to BSC. Patient refused seizure precautions,PCDs and pulse oxy monitoring ,no seizure activity this shift.contnue to monitor.Patient would like to sleep until 1000 am this morning
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
## 2019-09-23 ENCOUNTER — DOCUMENTATION ONLY (OUTPATIENT)
Dept: OTHER | Facility: CLINIC | Age: 49
End: 2019-09-23

## 2019-09-23 ENCOUNTER — APPOINTMENT (OUTPATIENT)
Dept: OCCUPATIONAL THERAPY | Facility: CLINIC | Age: 49
DRG: 101 | End: 2019-09-23
Attending: INTERNAL MEDICINE
Payer: MEDICARE

## 2019-09-23 ENCOUNTER — APPOINTMENT (OUTPATIENT)
Dept: PHYSICAL THERAPY | Facility: CLINIC | Age: 49
DRG: 101 | End: 2019-09-23
Attending: INTERNAL MEDICINE
Payer: MEDICARE

## 2019-09-23 PROCEDURE — 12000000 ZZH R&B MED SURG/OB

## 2019-09-23 PROCEDURE — 99232 SBSQ HOSP IP/OBS MODERATE 35: CPT | Performed by: PSYCHIATRY & NEUROLOGY

## 2019-09-23 PROCEDURE — 97162 PT EVAL MOD COMPLEX 30 MIN: CPT | Mod: GP

## 2019-09-23 PROCEDURE — 99232 SBSQ HOSP IP/OBS MODERATE 35: CPT | Performed by: INTERNAL MEDICINE

## 2019-09-23 PROCEDURE — 97116 GAIT TRAINING THERAPY: CPT | Mod: GP

## 2019-09-23 PROCEDURE — 25000132 ZZH RX MED GY IP 250 OP 250 PS 637: Mod: GY | Performed by: INTERNAL MEDICINE

## 2019-09-23 PROCEDURE — 97535 SELF CARE MNGMENT TRAINING: CPT | Mod: GO

## 2019-09-23 PROCEDURE — 97530 THERAPEUTIC ACTIVITIES: CPT | Mod: GP

## 2019-09-23 RX ORDER — DIPHENHYDRAMINE HCL 25 MG
50 CAPSULE ORAL EVERY 6 HOURS PRN
Status: DISCONTINUED | OUTPATIENT
Start: 2019-09-23 | End: 2019-09-26 | Stop reason: HOSPADM

## 2019-09-23 RX ADMIN — FELBAMATE 600 MG: 600 TABLET ORAL at 20:50

## 2019-09-23 RX ADMIN — LAMOTRIGINE 100 MG: 100 TABLET ORAL at 16:19

## 2019-09-23 RX ADMIN — LAMOTRIGINE 200 MG: 100 TABLET ORAL at 10:06

## 2019-09-23 RX ADMIN — LAMOTRIGINE 200 MG: 100 TABLET ORAL at 20:53

## 2019-09-23 RX ADMIN — Medication 1 LOZENGE: at 01:45

## 2019-09-23 RX ADMIN — FELBAMATE 600 MG: 600 TABLET ORAL at 10:05

## 2019-09-23 RX ADMIN — LEVETIRACETAM 1000 MG: 500 TABLET ORAL at 10:07

## 2019-09-23 RX ADMIN — LEVETIRACETAM 1000 MG: 500 TABLET ORAL at 20:52

## 2019-09-23 RX ADMIN — LEVETIRACETAM 1000 MG: 500 TABLET ORAL at 16:18

## 2019-09-23 RX ADMIN — FELBAMATE 600 MG: 600 TABLET ORAL at 16:18

## 2019-09-23 ASSESSMENT — ACTIVITIES OF DAILY LIVING (ADL)
ADLS_ACUITY_SCORE: 16
ADLS_ACUITY_SCORE: 17
ADLS_ACUITY_SCORE: 16
ADLS_ACUITY_SCORE: 14
ADLS_ACUITY_SCORE: 17
ADLS_ACUITY_SCORE: 14

## 2019-09-23 NOTE — PLAN OF CARE
Discharge Planner OT   Patient plan for discharge: Uncertain   Current status: Pt ambulated to the bathroom with walker and SBA. Pt transferred to/from the toilet with SBA. While standing at the sink with SBA, pt completed 3 grooming and hygiene tasks with set up. While seated/standing, pt competed lower body dressing with SBA.   Barriers to return to prior living situation: Current level of A  Recommendations for discharge: Home   Rationale for recommendations: Pt is near baseline in ADLs. Anticipate that will progress in established IP OT goal areas.        Entered by: Almas Miranda 09/23/2019 12:11 PM

## 2019-09-23 NOTE — PROVIDER NOTIFICATION
[facility] ist regarding patient requesting benadryl for itching there is an order for benadryl for agitation.Got call back and changed the existing order and its ok to give benadryl
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
## 2019-09-23 NOTE — CONSULTS
"Care Transition Initial Assessment - SW     Met with: Patient    Active Problems:    Breakthrough seizure (H)     Bipolar disorder  Personality Disorder  Hx: psychosis    DATA  Lives With: alone   Living Arrangements: apartment     Description of Support System: Uninvolved  Who is your support system?: Limited to(friend Carlito who is leaving for 1 week)  Support Assessment: Difficulty establishing and maintaining relationships, Inadequate interpersonal communication skills, Complicated family dynamics.   Identified issues/concerns regarding health management: Pt brought to [facility] with seizures. Pt had been at [facility] and has been non compliant with cares and with taking medications.    Pt has been evaluated by therapy and recommendation is for discharge home as pt appears to have no skilled need to need TCU placement.  SW consulted because pt does not want to return home. Her friend, Carlito from her apartment complex is leaving for a week and she does not feel safe going home without him.  Pt states that his family is not approving of their friendship. She has other friends listed on face sheet but states they are not available to assist her. One is a boyfriend she doesn't like and the other she states is essentially a stalker that she tolerates because \" he is nice to me and I use him.\"  Pt has a mother and daughter that are estranged.  SW discussed home situation and pt asking for PCA services. Per SW note on admission to OBS unit, pt is able to be reassessed for county services if she makes the appointment with the . SW indicated that this is a process that would happen after discharge in her apartment.   Extended stay hotel options discussed as will as option of staying with one of her other friends listed. Pt is not agreeable.  SW sent referral to Toney with [facility] to see if they are able to accept pt for respite care. Referral sent via [facility].  Pt has a doll collection and carries a doll with her at all " times.    Transportation Anticipated: public transportation    ASSESSMENT  Cognitive Status:  awake and alert  Concerns to be addressed: Reviewing with pt options available to her at discharge.     PLAN  Financial costs for the patient includes TBD  Patient Goals and Preferences: placement  Patient anticipates discharging to: home.    INGRID Borja  [facility] Care Transitions  Phone: [phone]
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
## 2019-09-23 NOTE — PLAN OF CARE
Pt admitted for breakthrough seizures due to med non-compliance. Refused sz precautions, no sz activity noted. A/O x4. Calm, cooperative during shift. Code green called for medication assistance-no issues. Neuro intact except baseline left deficits: Lf droop, Lf hemiparesis, Lf numbness. C/o generalized pain, declined intervention. Tolerating regular diet. Vdg. Regular bowels. Up with assist of SBA, belt and walker. Pt refusing release of information from [facility]. Does not want mother updated on cares at this time. Plan for discharge as soon as safe placement found.

## 2019-09-23 NOTE — PROGRESS NOTES
Contacted [facility] and requested patient's neurologist (Dr. Hernandez) contact Dr. Meade.  They stated he is on vacation but answers messages; they were sending a message to him and expect he will return the call.
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
## 2019-09-23 NOTE — PROGRESS NOTES
Chart is reviewed.    I will await if records will come from [facility] neurology [facility]. Pt. Appears to be neurologicaly stable now.

## 2019-09-23 NOTE — PROVIDER NOTIFICATION
Writer went to patient room for midnight neuro checks and vital signs but patient refused to do assessment and Vital signs and said not feeling well having HA and not able to sleep Writer offered tylenol but patient refused and not want anything from hospital and said leave me alone

## 2019-09-23 NOTE — PLAN OF CARE
"Patient here with breakthrough seizures non compliance with medication. Patient refused midnight neuro checks as ahe not feeling well c/o HA but declined intervention.VSS Neuro's intact except Lt side hemiparesis,Numbness tingling on Lt side and  Mild Lt droop( from previous stroke).On Regular diet.Patient had a phone call from mother after the call she looks like happy and said writer \"that was a very good conversation with my mom after long 5 years\" Up with one to two to BS. Patient refused seizure precautions,PCDs and pulse oxy monitoring no seizure activity this shift.Patient scored yellow in the aggression stop light tool VPM in place and had a long arm sitter overnight.continue to monitor.  "

## 2019-09-23 NOTE — PLAN OF CARE
Pt here with breakthrough seizure. A&O. Neuros shows residual deficits left deficits, left weakness, left sensory loss, left droop. VSS. Reg diet, thin liquids. Takes pills whole. Up with 1A, up to chair and up in room with CNA. HA but declining pain intervention. Pt scoring yellow on the Aggression Stop Light Tool, frustration at beginning of shift, see previous note, but much more pleasant rest of shift and apologized to author. Using call light appropriately. No seizure activity noted this shift

## 2019-09-24 ENCOUNTER — APPOINTMENT (OUTPATIENT)
Dept: OCCUPATIONAL THERAPY | Facility: CLINIC | Age: 49
DRG: 101 | End: 2019-09-24
Payer: MEDICARE

## 2019-09-24 PROCEDURE — 12000000 ZZH R&B MED SURG/OB

## 2019-09-24 PROCEDURE — 99232 SBSQ HOSP IP/OBS MODERATE 35: CPT | Performed by: INTERNAL MEDICINE

## 2019-09-24 PROCEDURE — 99232 SBSQ HOSP IP/OBS MODERATE 35: CPT | Performed by: PSYCHIATRY & NEUROLOGY

## 2019-09-24 PROCEDURE — 97535 SELF CARE MNGMENT TRAINING: CPT | Mod: GO | Performed by: OCCUPATIONAL THERAPY ASSISTANT

## 2019-09-24 RX ADMIN — LAMOTRIGINE 200 MG: 100 TABLET ORAL at 09:20

## 2019-09-24 RX ADMIN — LEVETIRACETAM 1000 MG: 500 TABLET ORAL at 15:38

## 2019-09-24 RX ADMIN — FELBAMATE 600 MG: 600 TABLET ORAL at 21:37

## 2019-09-24 RX ADMIN — FELBAMATE 600 MG: 600 TABLET ORAL at 15:38

## 2019-09-24 RX ADMIN — LAMOTRIGINE 200 MG: 100 TABLET ORAL at 21:39

## 2019-09-24 RX ADMIN — LAMOTRIGINE 100 MG: 100 TABLET ORAL at 15:39

## 2019-09-24 RX ADMIN — FELBAMATE 600 MG: 600 TABLET ORAL at 09:17

## 2019-09-24 RX ADMIN — LEVETIRACETAM 1000 MG: 500 TABLET ORAL at 21:38

## 2019-09-24 RX ADMIN — LEVETIRACETAM 1000 MG: 500 TABLET ORAL at 09:20

## 2019-09-24 ASSESSMENT — ACTIVITIES OF DAILY LIVING (ADL)
ADLS_ACUITY_SCORE: 17
ADLS_ACUITY_SCORE: 16
DRESS: 0-->INDEPENDENT
ADLS_ACUITY_SCORE: 16
ADLS_ACUITY_SCORE: 16
WHICH_OF_THE_ABOVE_FUNCTIONAL_RISKS_HAD_A_RECENT_ONSET_OR_CHANGE?: AMBULATION
ADLS_ACUITY_SCORE: 16
RETIRED_EATING: 0-->INDEPENDENT
ADLS_ACUITY_SCORE: 16

## 2019-09-24 ASSESSMENT — MIFFLIN-ST. JEOR: SCORE: 1237.35

## 2019-09-24 NOTE — PROGRESS NOTES
"[facility] Progress Note  Chart Reviewed, Pt discussed in Interdisciplinary Rounds.   Referral sent to [facility] regarding possible respite placement.     Intervention:   SW has spoken with Toney with the [facility] and states that they have concerns regarding acceptence of pt due to her behavior of non compliance.   SW will update pt of the review and results. Pt indicates she is not able to pay for hotel or respite care at TCU.  SW did discuss with pt that she has to have a skilled need to for coverage at a TCU which therapy has not recommended from the assessments completed.    Pt indicated that she plans to follow up with the [facility] to obtain assistance with needs at home through [facility] and she has the contact information to do so.     SW discussed options again of staying with friends, going to hotel, shelter, and she states she is not able to pay for hotel and has no other options. Pt continues to state that she would be willing to go to \"[place]\". SW states that psych does not believe she needs to be there, pt states that she did not to be there last time but they took her.     Pt has a consult today with psychiatry, and pending any new recommendations, pt anticipated to discharge home  Pending any other recommendations following psych consult.     Plan: Pt anticipated to discharge home when medically cleared.  Anticipated discharge placement: home  SW to visit if needed once final discharge determined.    INGRID Borja  [facility]  Phone: [phone]        "
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
## 2019-09-24 NOTE — PLAN OF CARE
Pt admitted for breakthrough seizures due to med non-compliance. Refused sz precautions, no sz activity noted. A/O x4. Anxious. Neuro intact except baseline left deficits: Lf droop, Lf hemiparesis, Lf numbness. C/o generalized pain, declined intervention. Tolerating regular diet. Vdg. Regular bowels. Up with assist of SBA, belt and walker. Plan for discharge to home with Home RN and OP after pysch evaluation.     3-7 PM addendum: Awaiting pysch consult for day.

## 2019-09-24 NOTE — PROVIDER NOTIFICATION
"Dr. Meade paged, \"Patient c/o pain- states she is unable to move. Refusing Tylenol. Requesting Midol. Please advise. Thank you.\"    Orders received.     Medication not available in hospital.   "

## 2019-09-24 NOTE — PLAN OF CARE
Pt admitted for breakthrough seizures due to med non-compliance. Refused seizure precautions, no seizure activity noted. Pt refused vitals and assessment throughout the night. VPM in place. Up SBA, GB & walker. Regular diet. No c/o pain noted.  Continue to monitor

## 2019-09-24 NOTE — PLAN OF CARE
Pt here with breakthrough szrs, med non-compliance. A&O x4. Neuros baseline L hemiparesis, numbness/pain to LLE. VSS on RA. Reg diet, thin liquids. Takes pills whole, using home meds. Up with SBAJOSE. Refusing szr precautions, suction at bedside. Shoulder, back, and LLE pain, declines intervention. Pt scoring yellow on the Aggression Stop Light Tool. Plan to discharge home when safe, nrsng cont to monitor.

## 2019-09-24 NOTE — PROGRESS NOTES
"SPIRITUAL HEALTH SERVICES Progress Note  -01    Pt was sad and afraid of being discharged. Pt mentioned that there is no place where she can go after get discharged.    Pt has a daughter who lives three hours from here and just had a baby so the daughter couldn't come to help. Pt also mentioned that families are not helping her at all and the only one friend could be helpful couldn't help.     Pt has a doll and pt said he is the son and his name is \"Camilo Chilel.\" Pt mentioned that she has lots of dolls at home.     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 And pt said, \"That's the best thing I have ever done in my life.\" Pt used to have a medical/service dog but  later and pt would like to have another dog in the future.     Pt proclaims herself as a Mandaen and later on pt said I believe all kinds of faiths. [facility] is available per request.      Les Castaneda  Chaplain Resident  "

## 2019-09-25 ENCOUNTER — APPOINTMENT (OUTPATIENT)
Dept: OCCUPATIONAL THERAPY | Facility: CLINIC | Age: 49
DRG: 101 | End: 2019-09-25
Payer: MEDICARE

## 2019-09-25 ENCOUNTER — APPOINTMENT (OUTPATIENT)
Dept: PHYSICAL THERAPY | Facility: CLINIC | Age: 49
DRG: 101 | End: 2019-09-25
Payer: MEDICARE

## 2019-09-25 PROCEDURE — 97530 THERAPEUTIC ACTIVITIES: CPT | Mod: GO | Performed by: OCCUPATIONAL THERAPY ASSISTANT

## 2019-09-25 PROCEDURE — 99232 SBSQ HOSP IP/OBS MODERATE 35: CPT | Performed by: INTERNAL MEDICINE

## 2019-09-25 PROCEDURE — 97116 GAIT TRAINING THERAPY: CPT | Mod: GP

## 2019-09-25 PROCEDURE — 99231 SBSQ HOSP IP/OBS SF/LOW 25: CPT | Performed by: PSYCHIATRY & NEUROLOGY

## 2019-09-25 PROCEDURE — 25000132 ZZH RX MED GY IP 250 OP 250 PS 637: Mod: GY | Performed by: INTERNAL MEDICINE

## 2019-09-25 PROCEDURE — 97535 SELF CARE MNGMENT TRAINING: CPT | Mod: GO | Performed by: OCCUPATIONAL THERAPY ASSISTANT

## 2019-09-25 PROCEDURE — 12000000 ZZH R&B MED SURG/OB

## 2019-09-25 RX ADMIN — LAMOTRIGINE 200 MG: 100 TABLET ORAL at 10:09

## 2019-09-25 RX ADMIN — FELBAMATE 600 MG: 600 TABLET ORAL at 15:09

## 2019-09-25 RX ADMIN — FELBAMATE 600 MG: 600 TABLET ORAL at 22:23

## 2019-09-25 RX ADMIN — LAMOTRIGINE 200 MG: 100 TABLET ORAL at 22:23

## 2019-09-25 RX ADMIN — LEVETIRACETAM 1000 MG: 500 TABLET ORAL at 10:09

## 2019-09-25 RX ADMIN — FELBAMATE 600 MG: 600 TABLET ORAL at 10:09

## 2019-09-25 RX ADMIN — LAMOTRIGINE 100 MG: 100 TABLET ORAL at 15:10

## 2019-09-25 RX ADMIN — LEVETIRACETAM 1000 MG: 500 TABLET ORAL at 22:23

## 2019-09-25 RX ADMIN — LEVETIRACETAM 1000 MG: 500 TABLET ORAL at 15:09

## 2019-09-25 RX ADMIN — Medication 1 LOZENGE: at 01:42

## 2019-09-25 ASSESSMENT — ACTIVITIES OF DAILY LIVING (ADL)
ADLS_ACUITY_SCORE: 16

## 2019-09-25 NOTE — PLAN OF CARE
Pt admitted for breakthrough seizures due to med non-compliance. Refused seizure precautions, no seizure activity noted. A&Ox4. VSS on RA. Up SBA with walker & GB. Regular diet. Neuro intact except baseline left deficits: Lf droop, Lf hemiparesis, Lf numbness. C/o generalized pain, heat packs applied. Plan for discharge to home with Home RN and OP after pysch evaluation. Family friend @bedside. Continue to monitor

## 2019-09-25 NOTE — PROVIDER NOTIFICATION
Message left for Dr. Tucker regarding seizure. Awaiting return call.     Return call from Dr Tucker. No further need to evaluate seizures.

## 2019-09-25 NOTE — PLAN OF CARE
Pt admitted for breakthrough seizures due to med non-compliance. Refused sz precautions, per friend- patient had one seizure today- see notes. A/O x4. Neuro intact except baseline left deficits: Lf droop, Lf hemiparesis, Lf numbness.Tolerating regular diet. Voiding. Regular bowels. Up with assist of SBA, belt and walker. Plan for discharge to home with Home RN and OP tomorrow.    Pt would like MD to be notified early in am for discharge order. Then be woken up at 9am to get ready to go. Pt has belongings in security that need to be brought up as well.     Pt stated that she needs to get home before 11 and discharge from the hospital by 10am.

## 2019-09-25 NOTE — PROGRESS NOTES
I was called to see pt. As she has Sz today. She described SZ when waking up from sleep with HA. She report `125 different type of Szs`. She has SZ every week on 3 AEDs.    She is now at baseline. She is wake and alert, speech and language are clear.    She is stressed about return home till tomorrow as she does not have keys to her apartment.    I met with pt. And reviewed everything.    I do not rec to change medications.    Pt. Can be discharge tomorrow from neurological standpoint.
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
## 2019-09-25 NOTE — CONSULTS
[facility] Psychiatric Consult Progress Note    Interval History:   Pt seen, chart reviewed, case discussed with nursing staff and treating clinicians.  Met with patient and her friend who used to live with her as per patient's request. . Patient indicates she is feeling much better and is not feeling paranoid or suspicious she clarified that she was not denying wanting to take her medications for her seizures but that she wanted to take brand medicine because she had bad experiences with generics and was not willing to take them is wanting to take her own medications from her own medication bottles treatment team has decided to let her do this feeling much less fearful and frightened she did indicates now although that she has nowhere to go until Thursday states the man that she lives with at her house brought her some supplies brought her medications ended up with the key to her house and [place] be [place] to Thursday states that the landlord is some when she feels is hostile towards her does not trust asking them to help her get into her place.  She states that because of her seizures she cannot be alone she is requesting to stay in the hospital until Thursday when she will have a friend and caretaker there.    Review of systems:   10 point Review of Systems completed by Dr. Benson, and is  is negative other than noted in the HPI     Medications:       felbamate  600 mg Oral TID     lamoTRIgine  100 mg Oral Daily     lamoTRIgine  200 mg Oral BID     levETIRAcetam  1,000 mg Oral TID     sodium chloride (PF)  3 mL Intracatheter Q8H     acetaminophen, sore throat lozenge, diphenhydrAMINE, diphenhydrAMINE, haloperidol, haloperidol lactate, lidocaine 4%, lidocaine (buffered or not buffered), LORazepam, LORazepam, LORazepam, melatonin, naloxone, ondansetron **OR** ondansetron, sodium chloride (PF)    Mental Status Examination:     Appearance: Neatly groomed  Eye Contact: Much  improved  Speech: Regular in rate and rhythm normal in volume and tone  Language:Normal  Psychomotor Behavior:  no evidence of tardive dyskinesia, dystonia, or tics  Mood: Much brighter  Affect: Significantly brighter  Thought Process:  logical, linear and goal oriented no loose associations  Thought Content:  no evidence of suicidal ideation or homicidal ideation, no auditory hallucinations present and no visual hallucinations present, some mild paranoia  Oriented to:  time, person, and place  Attention Span and Concentration:  limited  Recent and Remote Memory:  limited  Fund of Knowledge: intact  Muscle Strength and Tone: normal  Gait and Station: Normal  Insight:  limited  Judgment:  limited        Labs/Vitals:   No results found for this or any previous visit (from the past 24 hour(s)).  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  Patient is dramatically improved much less irritable and agitated and anxious.  She denies being resistant to her medication she just wanted to advocate for herself that she get brand medicines and she had bad experiences with generic in the past he was told the hospital did not have those plans that allowed her take her own supply he appears much less hostile much less paranoid much less aggravated way more cooperative.  Patient at this time does not meet criteria to be moved to psychiatry is not a danger to herself or others is able to contract no self-harm.  This point patient does have a valid point that if she were to leave could not get into her apartment she is a vulnerable adult and living in a hotel room without supportive cares would not work or recommend she be at the hospital until Thursday when she can get back in her own home.  Given the severity of her personality disorder try to  force her to leave because a great deal of conflict and she would escalate beyond reason.      DIagnoses:   1.  Bipolar 1 disorder with psychosis, current episode mixed.   2.  Epilepsy.   3. Borderline PD suspected         Plan:   1. Patient refusing antipsychotics or any changes to medications though this was offered given ongoing paranoia   2. She is declining psych transfer, and moving in the direction of discharge is in her best interest.   3. Discontinue sitter  4. Currently I would not hold her if she demands to leave or when I force her to leave against her will.     Attestation:  Patient has been seen and evaluated by me,  Pepe Rollins MD

## 2019-09-25 NOTE — PROGRESS NOTES
"See RN note. Called re: possible seizure witnessed by friend.   Unfortunately story is very vague and suspect.   She had a \"shaking fit\" of unknown length, and friend did not call or notify staff when it happened.     Therefore, we will monitor today with discharge tomorrow if no recurrent seizures observed. I have asked RN to call neurology team, who has been involved in her care, regarding appropriate monitoring for seizure vs pseudoseizure. Video EEG vs sitter in room.   "

## 2019-09-25 NOTE — PLAN OF CARE
Discharge Planner OT   Patient plan for discharge: home  Current status: pt completes supine to/from sit EOB independent, SBA sit to stand, amb with FWW SBA to/from bathroom, toilet transfer SBA, SBA standing at sink for ADLS. Pt c/o LBP, right shoulder pain stating it makes it difficult to completes transfers or ADLS. Pt c/o fatigue with activity.   Barriers to return to prior living situation: cognition  Recommendations for discharge: Home with RN for medication management and Home OT for safety cognitive eval per plan established by the Occupational Therapist  Rationale for recommendations: Pt is near baseline in ADLs. Pt appears to be at baseline for cognition however due to score of 15/30 on SLUMS pt would benefit from home cognitive eval while in her home setting to ensure safety during ADLS/IADLS.         Entered by: Hansa Williamson 09/25/2019 1:56 PM

## 2019-09-25 NOTE — PLAN OF CARE
Pt admitted for breakthrough seizures due to med non-compliance. Refused sz precautions, per friend- patient had one seizure today- see notes. A/O x4. Anxious. Neuro intact except baseline left deficits: Lf droop, Lf hemiparesis, Lf numbness. C/o generalized pain, declined intervention. Tolerating regular diet. Vdg. Regular bowels. Up with assist of SBA, belt and walker. Plan for discharge to home with Home RN and OP tomorrow.

## 2019-09-25 NOTE — PROGRESS NOTES
CM    I:  SW received request from patient to arrange for [employer] ()  transport to home tomorrow. SW explained most taxi rides have to be set up the same day. Pt stated this is not the case for [facility] and also stated she has never had to set up the ride herself. SW placed call to [facility] transport, who confirmed the ride must be set up when pt is ready for discharge. SW will call tomorrow morning. Pt stated she must leave by 10am, to be home no later than 11am.     P: Continue to assist as needed    INGRID Ferrera

## 2019-09-25 NOTE — PROGRESS NOTES
Met with patient about follow ups she will need after discharge.  I indicated I would like to help her set up follow up appointments with her Neurologist, Dr. Hernandez at [facility] and PCP.  She clearly indicated that I may not set up a follow up with Dr. Hernandez and she stated she wasn't sure if she had a PCP.  She stated a person she cannot get in contact with until she gets home may know if she has a PCP and who that would be.  She stated she may like to have therapy at home, but will not let me find a new PCP for her which is required to set up HHC.  Per her request, she was given a list of home care agencies.

## 2019-09-25 NOTE — PROGRESS NOTES
"Writer entered room to give scheduled anti seizure medications at 1000. Per friend at bedside, pt had a seizure this morning at 0930. He described as \"shaking fit\". When writer asked for further description he stated again it was a \"shaking fit\" involving the whole body. Patient stated she did not remember anything about the episode. States she has a headache which is typical for her after seizure. Writer asked why they didn't call when episode happened- both friend and patient did not have a response for question. Patient is safe, continues to refuse seizure precautions. Dr. Meade notified.    Called [facility] hub regarding episode. They stated they did not note any activity. They will continue to watch on monitor for possible seizures.   "

## 2019-09-25 NOTE — PLAN OF CARE
"Discharge Planner PT   Patient plan for discharge: reports she does not want to return to her apartment   Current status: Patient supine in bed upon arrival; agreeable to therapy. Performed supine <> sit independently. STS transfer completed with Mod I using FWW. Ambulated 250 ft x 2 with FWW, SBA and cues for walker proximity. Went up/down 3 stairs x 2 with bilateral railings and SBA and increased time. Noted L lateral lean intermittently. Repeated cues for sequencing needed. Patient reports having short and long term memory deficits. Very talkative throughout repeatedly asking, \"what if there is a fire and I have to use the stairs to get out?\" c/o pain throughout LEs into L hip and shoulder. Returned to bed at end of session.   Barriers to return to prior living situation: None from mobility standpoint  Recommendations for discharge: Return to prior living per plan established by the PT.  Rationale for recommendations: Pt mobilizing near baseline, some self limiting behaviors noted. Pt's needs appear more psychiatric. No further PT recommended at discharge. Will place order for FWW for pt to use at discharge. Will continue to follow during IP stay        Entered by: Malina Jacobs 09/25/2019 11:56 AM     "

## 2019-09-26 VITALS
BODY MASS INDEX: 23.23 KG/M2 | TEMPERATURE: 97.3 F | DIASTOLIC BLOOD PRESSURE: 69 MMHG | HEART RATE: 90 BPM | RESPIRATION RATE: 16 BRPM | HEIGHT: 64 IN | WEIGHT: 136.1 LBS | OXYGEN SATURATION: 98 % | SYSTOLIC BLOOD PRESSURE: 111 MMHG

## 2019-09-26 PROCEDURE — 25000132 ZZH RX MED GY IP 250 OP 250 PS 637: Mod: GY | Performed by: INTERNAL MEDICINE

## 2019-09-26 PROCEDURE — 99239 HOSP IP/OBS DSCHRG MGMT >30: CPT | Performed by: INTERNAL MEDICINE

## 2019-09-26 RX ADMIN — Medication 1 LOZENGE: at 02:27

## 2019-09-26 RX ADMIN — LAMOTRIGINE 200 MG: 100 TABLET ORAL at 09:15

## 2019-09-26 RX ADMIN — FELBAMATE 600 MG: 600 TABLET ORAL at 09:17

## 2019-09-26 RX ADMIN — LEVETIRACETAM 1000 MG: 500 TABLET ORAL at 09:16

## 2019-09-26 ASSESSMENT — ACTIVITIES OF DAILY LIVING (ADL)
ADLS_ACUITY_SCORE: 16

## 2019-09-26 NOTE — PLAN OF CARE
Pt admitted for breakthrough seizures due to med non-compliance. Refused seizure precautions. A/O x4. Anxious at times. Neuro intact except baseline left deficits: Lf droop, Lf hemiparesis, Lf numbness. C/o generalized pain, declined medical interventions, is using ice packs. Tolerating regular diet. Voiding adequately. Regular bowels, had a BM this evening. Up with assist of SBA, belt and walker. Plan for discharge to home with Home RN and OP tomorrow at 11am linda.

## 2019-09-26 NOTE — PLAN OF CARE
A/O x4, calm and pleasant this shift, forgetful at times. AVSS on RA. Neuros intact ex slight L droop, L-sided paresis and numbness - baseline. C/o RUE, RLE, and back pain, declined medication, heat and cold applied. Up SBA to BR, voiding adequately. Declined seizure precautions, no seizures this shift. Plan to discharge home with home RN and OT today.

## 2019-09-26 NOTE — PROGRESS NOTES
"CM (late entry-post discharge)    I:  It was reported to ROLANDO that patient had been deemed able to make her own decisions, however Psych notes from 9/24/19-9/25/19 do not appear to specifically state this. Based on this reported information, ROLANDO had bedside RN set up [facility] transport (taxi) to home today at 10 AM, as pt was cleared medically.  Prior to discharge, ROLANDO received a call from Nayla () Mental Health Worker at [facility] where pt resides. Per Nayla, pt is often combative and has been refusing services in her home; Nayla also stated patient reports she cannot bath herself or cook food as she will \"have a seizure\". Pt is reportedly covered under a CADI waiver and has been approved for 4.24 hours of PCA. Nayla suggests patient be placed into a group home, as she feels patient will be evicted soon from her apartment if her behaviors continue. ROLANDO explained patient most likely has a  thru [facility] who could coordinate this. We discussed making a [facility] report. ROLANDO explained since patient has been deemed able to make her own decisions and is medically ready for discharge, this SW will not proceed with any VA report. ROLANDO encouraged Nayla to complete this, as she has the information about patient's current conditions and concerns in her apartment. Nayla stated she will consider this.  No further SW interventions anticipated at this time.   "

## 2019-09-26 NOTE — DISCHARGE INSTRUCTIONS
A referral has been sent to [facility] for home RN and Occupational therapy.  They will call you before coming to your home.  Their number is [phone].    Please note follow up appointments made with your primary physician, Dr Gonzales at [facility] and Dr Hernandez at [facility] in [place].
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
## 2019-09-26 NOTE — PLAN OF CARE
VSS. A&O. Reports of generalized chronic pain. No seizure activity noted. Discharge instructions complete. Pt has no further questions at this time. Pt states friend is at her house waiting for her. All belongings sent home with pt. [employer] transporting pt.

## 2019-09-26 NOTE — PLAN OF CARE
"OT: tx session attempted however pt declined and spent >10 minutes talking about home environment and how she is not comfortable and does not feel safe returning home. Pt wanting to go to \"[facility].\" Educated pt on needs and that OT is recommending HHRN and OT services. Pt declined to work with therapy due to fatigue.     Occupational Therapy Discharge Summary    Reason for therapy discharge:    Discharged to home with home therapy.    Progress towards therapy goal(s). See goals on Care Plan in [facility] electronic health record for goal details.  Goals partially met.  Barriers to achieving goals:   discharge from facility.    Therapy recommendation(s):    Continued therapy is recommended.  Rationale/Recommendations:  HHOT.      "

## 2019-12-04 ENCOUNTER — OFFICE VISIT (OUTPATIENT)
Dept: FAMILY MEDICINE | Facility: CLINIC | Age: 49
End: 2019-12-04

## 2019-12-04 VITALS
DIASTOLIC BLOOD PRESSURE: 58 MMHG | RESPIRATION RATE: 16 BRPM | OXYGEN SATURATION: 98 % | WEIGHT: 133 LBS | SYSTOLIC BLOOD PRESSURE: 100 MMHG | HEIGHT: 64 IN | BODY MASS INDEX: 22.71 KG/M2 | HEART RATE: 88 BPM

## 2019-12-04 DIAGNOSIS — G40.909 SEIZURE DISORDER (H): Primary | ICD-10-CM

## 2019-12-04 DIAGNOSIS — R05.9 COUGH: ICD-10-CM

## 2019-12-04 DIAGNOSIS — L70.0 ACNE VULGARIS: ICD-10-CM

## 2019-12-04 DIAGNOSIS — T14.91XA SUICIDAL BEHAVIOR WITH ATTEMPTED SELF-INJURY (H): ICD-10-CM

## 2019-12-04 DIAGNOSIS — K21.00 GASTROESOPHAGEAL REFLUX DISEASE WITH ESOPHAGITIS: ICD-10-CM

## 2019-12-04 PROBLEM — M41.9 SCOLIOSIS: Status: ACTIVE | Noted: 2019-12-04

## 2019-12-04 PROCEDURE — 99214 OFFICE O/P EST MOD 30 MIN: CPT | Performed by: NURSE PRACTITIONER

## 2019-12-04 RX ORDER — CLINDAMYCIN PHOSPHATE 11.9 MG/ML
SOLUTION TOPICAL 2 TIMES DAILY
Qty: 60 ML | Refills: 3 | Status: SHIPPED | OUTPATIENT
Start: 2019-12-04 | End: 2022-10-04

## 2019-12-04 ASSESSMENT — MIFFLIN-ST. JEOR: SCORE: 1215.34

## 2019-12-04 NOTE — PATIENT INSTRUCTIONS
Adult Self-Care for Colds    Colds are caused by viruses. They can't be cured with antibiotics. However, you can ease symptoms and support your body's efforts to heal itself. No matter which symptoms you have, be sure to:    Drink plenty of fluids (water or clear soup)    Stop smoking and drinking alcohol    Get plenty of rest  Understand a fever    Take your temperature several times a day. If your fever is 100.4 F (38.0 C) for more than a day, call your healthcare provider.    Relax, lie down. Go to bed if you want. Just get off your feet and rest. Also, drink plenty of fluids to avoid dehydration.    Take acetaminophen or a nonsteroidal anti-inflammatory agent (NSAID), such as ibuprofen.  Treat a troubled nose kindly    Breathe steam or heated humidified air to open blocked nasal passages.  a hot shower or use a vaporizer. Be careful not to get burned by the steam.    Saline nasal sprays and decongestant tablets help open a stuffy nose. Antihistamines can also help, but they can cause side effects such as drowsiness and drying of the eyes, nose, and mouth.  Soothe a sore throat and cough    Gargle every 2 hours with 1/4 teaspoon of salt dissolved in 1/2 cup of warm water. Suck on throat lozenges and cough drops to moisten your throat.    Cough medicines are available but it is unclear how well they actually work.    Take acetaminophen or an NSAID, such as ibuprofen, to ease throat pain  Ease digestive problems    Put fluids back into your body. Take frequent sips of clear liquids such as water or broth. Avoid drinks that have a lot of sugar in them, such as juices and sodas. These can make diarrhea worse. Older children and adults can drink sports drinks.    As your appetite returns, you can resume your normal diet. Ask your healthcare provider if there are any foods you should avoid.  When to seek medical care  When you first notice symptoms, ask your healthcare provider if antiviral medicines are  appropriate. Antibiotics should not be taken for colds or flu. Also, call your healthcare provider if you have any of the following symptoms or if you aren't feeling better after 7 days:    Shortness of breath    Pain or pressure in the chest or belly (abdomen)    Worsening symptoms, especially after a period of improvement    Fever of 100.4 F  (38.0 C) or higher, or fever that doesn't go down with medicine    Sudden dizziness or confusion    Severe or continued vomiting    Signs of dehydration, including extreme thirst, dark urine, infrequent urination, dry mouth    Spotted, red, or very sore throat   Date Last Reviewed: 12/1/2016 2000-2018 [employer]. [address] [place] [address], [place] [address]. All rights reserved. This information is not intended as a substitute for professional medical care. Always follow your healthcare professional's instructions.
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
## 2019-12-05 ENCOUNTER — PATIENT OUTREACH (OUTPATIENT)
Dept: CARE COORDINATION | Facility: CLINIC | Age: 49
End: 2019-12-05

## 2019-12-05 ASSESSMENT — ACTIVITIES OF DAILY LIVING (ADL): DEPENDENT_IADLS:: TRANSPORTATION

## 2019-12-05 NOTE — TELEPHONE ENCOUNTER
12/5/19 printer letter and mailed to patient.    Babar Bravo,   [facility]  [phone]

## 2019-12-05 NOTE — PROGRESS NOTES
Clinic Care Coordination Contact  [facility]/Voicemail    Referral Source: Avelina Quinonez    Clinical Data: Patient is a 49 year old with a history of Bipolar 1 disorder, mixed, Epilepsy, Depression, Anxiety, Memory Loss, and Cerebral Emobolism. Patient presented to [facility] on 12/4/19 with ill symptoms and noted that she is concerned that she needs help as far as daily living skills and maintaining her independence (household tasks, transportation, medical benefits/filling out paperwork, etc).  Care Coordinator outreaching to assess psychosocial status and offer guidance/support/resources.     Outreach attempted x 1.  Left message on patient's voicemail with call back information and requested return call.    Plan:  Care Coordinator will send intro letter via mail and try to outreach again within 1-3 days.     Beatriz Lemus MSW, [facility]  Clinic Care Coordinator  [email or web address]  [phone]

## 2019-12-19 ENCOUNTER — PATIENT OUTREACH (OUTPATIENT)
Dept: CARE COORDINATION | Facility: CLINIC | Age: 49
End: 2019-12-19

## 2019-12-19 NOTE — PROGRESS NOTES
Clinic Care Coordination Contact  [facility]       Clinical Data: Care Coordinator following up on previous outreach attempt to go over psychosocial status as patient indicated she needs an increase level of community supports to help maintain her daily living (household tasks, finances, transportation, medical needs/benefits). Patient requested help to get a  for mental health. CC outreaching to assess and offer resources/assistance.      Outreach attempted x 2.   Phone went right to [facility] (didn't ring). CC left a message requesting a call back.       Plan:  Care Coordinator has tried to outreach 2x and sent an intro letter but has not been able to connect with patient. Will send unable to contact letter. Will schedule no further outreach but can be available if patient calls or a new referral is placed.     Beatriz Lemus, MSW, [facility]  Clinic Care Coordinator  [email or web address]  [phone]

## 2019-12-19 NOTE — LETTER
[facility]      January 13, 2020      Suzi Mckoy  [address]   [place] [facility] [place] [facility] [address]    Dear Suzi,    We have been trying to reach you to introduce you to [facility] Care Coordination program.  The goal of care coordination is to help you manage your health and improve access to the [facility] system in the most efficient manner.  The Care Coordinator is a  who understands the healthcare system and will assist you in improving your access to care and getting connected to services.    As your Physician and Care Coordinator we partner to help you achieve your health care goals.     If able, call your Care Coordinator at [phone], that would be appreciated.  We at [facility] are focused on providing you with the highest-quality healthcare experience possible.      It is a pleasure to partner with you as we work towards achieving your optimal state of wellness.        Sincerely,    Beatriz Webber [facility]    [phone]         Avelina Quinonez  None   [address] / [facility] [place] [facility] [address]

## 2019-12-20 ENCOUNTER — OFFICE VISIT (OUTPATIENT)
Dept: FAMILY MEDICINE | Facility: CLINIC | Age: 49
End: 2019-12-20

## 2019-12-20 VITALS
OXYGEN SATURATION: 96 % | RESPIRATION RATE: 16 BRPM | DIASTOLIC BLOOD PRESSURE: 58 MMHG | TEMPERATURE: 98.5 F | BODY MASS INDEX: 22.88 KG/M2 | WEIGHT: 134 LBS | HEART RATE: 85 BPM | HEIGHT: 64 IN | SYSTOLIC BLOOD PRESSURE: 102 MMHG

## 2019-12-20 DIAGNOSIS — R05.9 COUGH: ICD-10-CM

## 2019-12-20 DIAGNOSIS — J02.0 STREPTOCOCCAL SORE THROAT: Primary | ICD-10-CM

## 2019-12-20 LAB — S PYO AG THROAT QL IA.RAPID: NEGATIVE

## 2019-12-20 PROCEDURE — 87430 STREP A AG IA: CPT | Performed by: NURSE PRACTITIONER

## 2019-12-20 PROCEDURE — 99213 OFFICE O/P EST LOW 20 MIN: CPT | Performed by: NURSE PRACTITIONER

## 2019-12-20 RX ORDER — DEXTROMETHORPHAN POLISTIREX 30 MG/5ML
60 SUSPENSION ORAL 2 TIMES DAILY
Qty: 148 ML | Refills: 1 | Status: SHIPPED | OUTPATIENT
Start: 2019-12-20 | End: 2022-10-04

## 2019-12-20 RX ORDER — BENZONATATE 200 MG/1
200 CAPSULE ORAL 3 TIMES DAILY PRN
Qty: 42 CAPSULE | Refills: 0 | Status: SHIPPED | OUTPATIENT
Start: 2019-12-20 | End: 2019-12-27

## 2019-12-20 ASSESSMENT — MIFFLIN-ST. JEOR: SCORE: 1227.82

## 2019-12-20 NOTE — PROGRESS NOTES
"Subjective     Suzi Mckoy is a 49 year old female who presents to clinic today for the following health issues:    HPI   Concern - Cold/Sore Throat    Onset: 3 weeks    Description:   Sore throat, congestion, cough    Intensity: moderate    Progression of Symptoms:  worsening    Accompanying Signs & Symptoms:  Above    Previous history of similar problem:   yes    Precipitating factors:   Worsened by: lying down    Alleviating factors:  Improved by: None    Therapies Tried and outcome: Nyquil and Vicks to help sleep  PROBLEMS TO ADD ON...      Reviewed and updated as needed this visit by Provider         Review of Systems   ROS COMP: Constitutional, HEENT, cardiovascular, pulmonary, gi and gu systems are negative, except as otherwise noted.      Objective    /58   Pulse 85   Temp 98.5  F (36.9  C)   Resp 16   Ht 1.626 m (5' 4\")   Wt 60.8 kg (134 lb)   SpO2 96%   BMI 23.00 kg/m    Body mass index is 23 kg/m .  Physical Exam   APPEARANCE: = Relaxed and in no distress  Conj/Eyelids = noninjected and lids and lashes are without inflammation  PERRLA/Irises = Pupils Round Reactive to Light and Irisis without inflammation  Ears/Nose = External structures and Nares have usual shape and form  Ear canals and TM's = Canals are not inflammed and have none or little wax and the drums are not injected and have a light reflex   Lips/Teeth/Gums = No lesions seen, good dentition, and gums seem healthy  Oropharynx = No leukoplakia, No injection to the tissues, Normal Uvula  Neck = No anterior or posterior adenopathy appreciated.  Resp effort = Calm regular breathing  Breath Sounds = Good air movement with no rales or rhonchi in any lung fields  Mood/Affect = Cooperative and interested        Diagnostic Test Results:  Labs reviewed in Epic        Assessment & Plan     1.sore throat    - Rapid Strep ([facility])  - Beta Strep Grp A Cult ([facility])    2. Cough    - dextromethorphan (DELSYM) 30 MG/5ML liquid; Take 10 mLs (60 " mg) by mouth 2 times daily  Dispense: 148 mL; Refill: 1     See Patient Instructions    No follow-ups on file.    Avelina Quinonez NP  [facility]

## 2019-12-22 LAB — BETA STREP GP A CULTURE: ABNORMAL

## 2019-12-27 ENCOUNTER — OFFICE VISIT (OUTPATIENT)
Dept: FAMILY MEDICINE | Facility: CLINIC | Age: 49
End: 2019-12-27

## 2019-12-27 VITALS
WEIGHT: 131 LBS | RESPIRATION RATE: 20 BRPM | BODY MASS INDEX: 22.36 KG/M2 | SYSTOLIC BLOOD PRESSURE: 92 MMHG | OXYGEN SATURATION: 90 % | TEMPERATURE: 97.5 F | DIASTOLIC BLOOD PRESSURE: 64 MMHG | HEIGHT: 64 IN | HEART RATE: 82 BPM

## 2019-12-27 DIAGNOSIS — R05.9 COUGH: Primary | ICD-10-CM

## 2019-12-27 PROCEDURE — 71046 X-RAY EXAM CHEST 2 VIEWS: CPT | Mod: FY | Performed by: NURSE PRACTITIONER

## 2019-12-27 PROCEDURE — 99213 OFFICE O/P EST LOW 20 MIN: CPT | Mod: 25 | Performed by: NURSE PRACTITIONER

## 2019-12-27 RX ORDER — OMEGA-3 FATTY ACIDS/FISH OIL 300-1000MG
200 CAPSULE ORAL PRN
COMMUNITY

## 2019-12-27 RX ORDER — AZITHROMYCIN 250 MG/1
TABLET, FILM COATED ORAL
Qty: 6 TABLET | Refills: 0 | Status: SHIPPED | OUTPATIENT
Start: 2019-12-27 | End: 2020-03-15

## 2019-12-27 ASSESSMENT — MIFFLIN-ST. JEOR: SCORE: 1214.21
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
## 2019-12-27 NOTE — PATIENT INSTRUCTIONS
Patient Education     When You Have Pneumonia  You have been diagnosed with pneumonia. This is a serious lung infection. Most cases of pneumonia are caused by bacteria. Pneumonia most often occurs in older adults, young children, and people with chronic health problems.  Home care    Take your medicine exactly as directed. Don t skip doses. Continue taking your antibiotics as until they are all gone, even if you start to feel better. This will prevent the pneumonia from coming back.    Drink at least 8 glasses of water daily, unless directed otherwise. This helps to loosen and thin secretions so that you can cough them up.    Use a cool-mist humidifier in your bedroom. Be sure to clean the humidifier daily.    Don t use medicines to suppress your cough unless your cough is dry, painful, or interferes with your sleep. Coughing up mucus is normal. You may use an expectorant if your healthcare provider says it s okay.    You can use warm compresses or a heating pad on the lowest setting to relieve chest discomfort. Use several times a day for 15-20 minutes at a time. To prevent injury to your skin, set the temperature to warm, not hot. Don t put the compress or pad directly on your skin. Make certain it has a cover or wrap it in a towel. This is to prevent skin burns.    Get plenty of rest until your fever, shortness of breath, and chest pain go away.    Plan to get a flu shot every year. The flu is a common cause of pneumonia. Getting a flu shot every year can help prevent both the flu and pneumonia.  Getting the pneumococcal vaccine  Talk with your healthcare provider about getting the pneumococcal vaccine. Pneumococcal pneumonia is caused by bacteria that spread from person to person. It can cause minor problems, such as ear infections. But it can also turn into life-threatening illnesses of the lungs (pneumonia), the covering of the brain and spinal cord (meningitis), and the blood (bacteremia).  Children under 2  years of age, adults over age 65, people with certain health conditions, and smokers are at the highest risk of pneumococcal disease. This vaccine can help prevent pneumococcal disease in both adults and children. Some people should not have the vaccine. Make sure to ask your healthcare provider if you should have the vaccine.   Follow-up care  Make a follow-up appointment as directed by our staff.  When to call your healthcare provider  Call your healthcare provider right away if you have any of the following:    Fever of 100.4 F (38 C) or higher, or as directed by your healthcare provider    Mucus from the lungs (sputum) that s yellow, green, bloody, or smells bad    A large amount of sputum    Vomiting    Symptoms that get worse  Call 911  Call 911 right away if you have any of the following:    Chest pain    Trouble breathing    Blue lips or fingernails   Date Last Reviewed: 11/1/2016 2000-2018 [employer]. [address] [place] [address]. All rights reserved. This information is not intended as a substitute for professional medical care. Always follow your healthcare professional's instructions.             Azithromycin Oral tablet    Azithromycin Solution for injection  Azithromycin Oral tablet  What is this medicine?  AZITHROMYCIN (az ith zahraa MYE sin) is a macrolide antibiotic. It is used to treat or prevent certain kinds of bacterial infections. It will not work for colds, flu, or other viral infections.  This medicine may be used for other purposes; ask your health care provider or pharmacist if you have questions.  What should I tell my health care provider before I take this medicine?  They need to know if you have any of these conditions:    kidney disease    liver disease    irregular heartbeat or heart disease    an unusual or allergic reaction to azithromycin, erythromycin, other macrolide antibiotics, foods, dyes, or preservatives    pregnant or trying to get  pregnant    breast-feeding  How should I use this medicine?  Take this medicine by mouth with a full glass of water. Follow the directions on the prescription label. The tablets can be taken with food or on an empty stomach. If the medicine upsets your stomach, take it with food. Take your medicine at regular intervals. Do not take your medicine more often than directed. Take all of your medicine as directed even if you think your are better. Do not skip doses or stop your medicine early.  Talk to your pediatrician regarding the use of this medicine in children. Special care may be needed.  Overdosage: If you think you have taken too much of this medicine contact a poison control center or emergency room at once.  NOTE: This medicine is only for you. Do not share this medicine with others.  What if I miss a dose?  If you miss a dose, take it as soon as you can. If it is almost time for your next dose, take only that dose. Do not take double or extra doses.  What may interact with this medicine?  Do not take this medicine with any of the following medications:    lincomycin  This medicine may also interact with the following medications:    amiodarone    antacids    birth control pills    cyclosporine    digoxin    magnesium    nelfinavir    phenytoin    warfarin  This list may not describe all possible interactions. Give your health care provider a list of all the medicines, herbs, non-prescription drugs, or dietary supplements you use. Also tell them if you smoke, drink alcohol, or use illegal drugs. Some items may interact with your medicine.  What should I watch for while using this medicine?  Tell your doctor or health care professional if your symptoms do not improve.  Do not treat diarrhea with over the counter products. Contact your doctor if you have diarrhea that lasts more than 2 days or if it is severe and watery.  This medicine can make you more sensitive to the sun. Keep out of the sun. If you cannot  avoid being in the sun, wear protective clothing and use sunscreen. Do not use sun lamps or tanning beds/booths.  What side effects may I notice from receiving this medicine?  Side effects that you should report to your doctor or health care professional as soon as possible:    allergic reactions like skin rash, itching or hives, swelling of the face, lips, or tongue    confusion, nightmares or hallucinations    dark urine    difficulty breathing    hearing loss    irregular heartbeat or chest pain    pain or difficulty passing urine    redness, blistering, peeling or loosening of the skin, including inside the mouth    white patches or sores in the mouth    yellowing of the eyes or skin  Side effects that usually do not require medical attention (report to your doctor or health care professional if they continue or are bothersome):    diarrhea    dizziness, drowsiness    headache    stomach upset or vomiting    tooth discoloration    vaginal irritation  This list may not describe all possible side effects. Call your doctor for medical advice about side effects. You may report side effects to FDA at 1-614-EMQ-5386.  Where should I keep my medicine?  Keep out of the reach of children.  Store at room temperature between 15 and 30 degrees C (59 and 86 degrees F). Throw away any unused medicine after the expiration date.  NOTE:This sheet is a summary. It may not cover all possible information. If you have questions about this medicine, talk to your doctor, pharmacist, or health care provider. Copyright  2016 [employer]

## 2020-01-13 NOTE — TELEPHONE ENCOUNTER
1/13/20 letter printed and mailed to patient.    Babar Bravo,   [facility]  [phone]

## 2020-01-20 ENCOUNTER — APPOINTMENT (OUTPATIENT)
Dept: ULTRASOUND IMAGING | Facility: CLINIC | Age: 50
End: 2020-01-20
Attending: NURSE PRACTITIONER
Payer: MEDICARE

## 2020-01-20 ENCOUNTER — HOSPITAL ENCOUNTER (EMERGENCY)
Facility: CLINIC | Age: 50
Discharge: HOME | End: 2020-01-20
Attending: NURSE PRACTITIONER | Admitting: NURSE PRACTITIONER
Payer: MEDICARE

## 2020-01-20 VITALS
BODY MASS INDEX: 22.36 KG/M2 | SYSTOLIC BLOOD PRESSURE: 103 MMHG | RESPIRATION RATE: 18 BRPM | DIASTOLIC BLOOD PRESSURE: 51 MMHG | OXYGEN SATURATION: 99 % | WEIGHT: 131 LBS | TEMPERATURE: 98.3 F | HEIGHT: 64 IN

## 2020-01-20 DIAGNOSIS — M79.661 BILATERAL CALF PAIN: ICD-10-CM

## 2020-01-20 DIAGNOSIS — M79.662 BILATERAL CALF PAIN: ICD-10-CM

## 2020-01-20 PROCEDURE — 93970 EXTREMITY STUDY: CPT

## 2020-01-20 PROCEDURE — 99284 EMERGENCY DEPT VISIT MOD MDM: CPT | Mod: 25

## 2020-01-20 ASSESSMENT — MIFFLIN-ST. JEOR: SCORE: 1214.21
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
## 2020-01-20 NOTE — ED AVS SNAPSHOT
Emergency Department  [address] [place] [address]  Phone:  [phone]  Fax:  [phone]                                    Suzi Mckoy   MRN: [id number]    Department:   Emergency Department   Date of Visit:  1/20/2020           After Visit Summary Signature Page    I have received my discharge instructions, and my questions have been answered. I have discussed any challenges I see with this plan with the nurse or doctor.    ..........................................................................................................................................  Patient/Patient Representative Signature      ..........................................................................................................................................  Patient Representative Print Name and Relationship to Patient    ..................................................               ................................................  Date                                   Time    ..........................................................................................................................................  Reviewed by Signature/Title    ...................................................              ..............................................  Date                                               Time          22EPIC Rev 08/18

## 2020-02-05 ENCOUNTER — TELEPHONE (OUTPATIENT)
Dept: FAMILY MEDICINE | Facility: CLINIC | Age: 50
End: 2020-02-05

## 2020-02-05 NOTE — TELEPHONE ENCOUNTER
Spoke with patient at this time she is not going to see gastro, but she may in the future.    Baabr Bravo,   [facility]  [phone]

## 2020-02-24 ENCOUNTER — MEDICAL CORRESPONDENCE (OUTPATIENT)
Dept: FAMILY MEDICINE | Facility: CLINIC | Age: 50
End: 2020-02-24

## 2020-03-15 ENCOUNTER — HOSPITAL ENCOUNTER (EMERGENCY)
Facility: CLINIC | Age: 50
Discharge: HOME | End: 2020-03-15
Attending: EMERGENCY MEDICINE | Admitting: EMERGENCY MEDICINE
Payer: MEDICARE

## 2020-03-15 VITALS
DIASTOLIC BLOOD PRESSURE: 94 MMHG | HEART RATE: 96 BPM | SYSTOLIC BLOOD PRESSURE: 127 MMHG | TEMPERATURE: 98.5 F | BODY MASS INDEX: 23.92 KG/M2 | WEIGHT: 135 LBS | HEIGHT: 63 IN | OXYGEN SATURATION: 98 % | RESPIRATION RATE: 18 BRPM

## 2020-03-15 DIAGNOSIS — F41.1 ANXIETY REACTION: ICD-10-CM

## 2020-03-15 DIAGNOSIS — R05.9 COUGH: ICD-10-CM

## 2020-03-15 PROCEDURE — 25000132 ZZH RX MED GY IP 250 OP 250 PS 637: Mod: GY | Performed by: EMERGENCY MEDICINE

## 2020-03-15 PROCEDURE — 99283 EMERGENCY DEPT VISIT LOW MDM: CPT

## 2020-03-15 RX ORDER — LORAZEPAM 1 MG/1
1 TABLET ORAL ONCE
Status: COMPLETED | OUTPATIENT
Start: 2020-03-15 | End: 2020-03-15

## 2020-03-15 RX ADMIN — LORAZEPAM 1 MG: 1 TABLET ORAL at 07:59

## 2020-03-15 ASSESSMENT — MIFFLIN-ST. JEOR: SCORE: 1211.49

## 2020-03-15 NOTE — ED NOTES
"MD offered pt ativan. Pt states that all generic medications cause an allergic response. States \"last time they gave me a generic it almost killed me.\" RN brought pt Ativan, pt took it after telling RN \"I will probably choke on this pill because it is so small. I swallow large pills much better.\" Also stated \"depends on the water but I am allergic to most water too\" Pt asked RN about the current COVID-19 situation and asked how many cases there are in [place] currently.  "

## 2020-03-15 NOTE — DISCHARGE INSTRUCTIONS
Monitor for increasing shortness of breath, cough, fever, chills, muscle aches, vomiting  Return if worse

## 2020-03-15 NOTE — ED AVS SNAPSHOT
Emergency Department  [address] [place] [address]  Phone:  [phone]  Fax:  [phone]                                    Suzi Mckoy   MRN: [id number]    Department:   Emergency Department   Date of Visit:  3/15/2020           After Visit Summary Signature Page    I have received my discharge instructions, and my questions have been answered. I have discussed any challenges I see with this plan with the nurse or doctor.    ..........................................................................................................................................  Patient/Patient Representative Signature      ..........................................................................................................................................  Patient Representative Print Name and Relationship to Patient    ..................................................               ................................................  Date                                   Time    ..........................................................................................................................................  Reviewed by Signature/Title    ...................................................              ..............................................  Date                                               Time          22EPIC Rev 08/18

## 2020-03-15 NOTE — ED NOTES
"RN checked in on patient and asked if she was feeling better. Pt shook head \"no\" visitor in room states \"she hasn't coughed since you gave her that medication.\" Pt is sitting upright in bed, watching TV, texting on phone. No coughing heard.  "
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
## 2020-03-17 ENCOUNTER — PATIENT OUTREACH (OUTPATIENT)
Dept: CARE COORDINATION | Facility: CLINIC | Age: 50
End: 2020-03-17

## 2020-03-17 DIAGNOSIS — F41.1 ANXIETY REACTION: Primary | ICD-10-CM

## 2020-03-17 NOTE — PROGRESS NOTES
Clinic Care Coordination Contact  [facility]/Voicemail    Referral Source: IP Report      Clinical Data: Care Coordinator previously tried to outreach to patient in December 3x with no success so discontinued attempts.    Patient showed up on [facility] discharge report as meeting criteria for outreach again.    She went to the ED 3/15/20 at [facility] for heightened anxiety and concern for corona virus and having a cough. She was at the bar the night before and there was discussion about coronavirus and avoiding public venues. Patient woke up with a cough reportedly and it worried her.    She has not had any foreign travel or exposure to known cases and she doesn't work. Patient was observed and she only coughed when asked about her cough, otherwise did not. Patient was given ativan to help calm her anxiety.     Patient was discharged back home. She was informed of symptoms to watch for and to follow up with PCP or return if symptoms worsen. At this point patient did not have fever, chills, body aches, nausea, vomiting, or any other symptoms.     Outreach attempted x 1.  Left message on patient's voicemail with call back information and requested return call.    Plan:  Care Coordinator will try to outreach again within one week. Encouraged patient to call back. Routed update to PCP.    Beatriz Lemus, MSW, [facility]  Clinic Care Coordinator  [email or web address]  [phone]

## 2020-03-30 ENCOUNTER — PATIENT OUTREACH (OUTPATIENT)
Dept: CARE COORDINATION | Facility: CLINIC | Age: 50
End: 2020-03-30

## 2020-03-30 ASSESSMENT — ACTIVITIES OF DAILY LIVING (ADL): DEPENDENT_IADLS:: TRANSPORTATION

## 2020-03-30 NOTE — TELEPHONE ENCOUNTER
3/30/20 letter printed and mailed to patient.    Babar Bravo,   [facility]  [phone]

## 2020-03-30 NOTE — PROGRESS NOTES
"Clinic Care Coordination Contact    Referral Information:  Referral Source: IP Report    Patient is a 50 year old with a history of Anxiety, Bipolar 1 Disorder, Depression, Epilepsy, Memory Loss, and Cerebral Embolism. Patient called the clinic over the weekend and expressed psychosocial stress related to transportation. ROLANDO Care Coordinator outreaching to assess and provide resources.     Primary Diagnosis: Behavioral Health    Chief Complaint   Patient presents with     Clinic Care Coordination - Initial     needs OTC medication but has barriers of transportation, psychosocial distress         Universal Utilization:   Utilization    Last refreshed: 3/30/2020  2:04 PM:  Hospital Admissions 2           Last refreshed: 3/30/2020  2:04 PM:  ED Visits 4           Last refreshed: 3/30/2020  2:04 PM:  No Show Count (past year) 0              Current as of: 3/30/2020  2:04 PM              Clinical Concerns:  Current Medical Concerns: Patient stated that she needs Flonase and Allegra OTC for allergies but she doesn't have transportation. She stated that she did do a [employer] delivery but they are \"out of a lot of stuff\" so she didn't get everything she ordered. She noted she lives across the street from [employer]. Her friends don't drive and they are her main informal support. Pt was wondering if the pharmacy would deliver it- provided education on this. Empowered pt to keep calling [employer] and other convenience stores to see when they are back in stock to get it. Patient indicated understanding. She has [employer] rides for to/from appointments.     Patient has a history of mental health concerns. She stated she used to have the CADI waiver but it lapsed and she is waiting for a reassessment. She was concerned about her stress level during the pandemic and how sometimes this can make it so she has a break through seizure. ROLANDO CC did not get to assess if pt follows with therapy/psychiatry today. Pt's last clinic visit to [facility] " "primary care was in December.     Pt was then referred to [facility] to establish with a new neurologist.     Current Behavioral Concerns: Pt identified she needs help with IADL's. She expressed frustration with \"losing all her services\". She noted she used to have PCA services and Independent living skills. She also noted she used to have section 8 but doesn't anymore. Called [facility] with patient and was informed that pt does have a waiver assessment coming up with \"Willa Bull\" ([phone]). Left a voicemail for Willa with pt on the phone to check in with the  to support patient in this. She may need to do the assessment via phone due to coronavirus pandemic.     Pt is due for an annual physical     Education Provided to patient: Reminded pt that she is due for an annual physical and can call the clinic to schedule.     Functional Status:  Has [employer] rides for appointments, but other than that does not have transportation. She lives with friends who do not drive. Discussed applying for metro mobility as an option.     Pt independent in ADL's. Needs assistance for IADL's.  *CADI waiver reassessment upcoming     Living Situation:  Lives with \"friends\" in an apartment     Lifestyle & Psychosocial Needs:   Pt does not work, she does receive SSDI. She has epilepsy and SPMI. She will be getting reassessed for waiver services again soon as confirmed by [facility] today. Pt has a goal to get PCA, independent living skills worker, and transportation services at least through the waiver. Pt is currently spending most of her time at home due to coronavirus pandemic and social distancing guidelines. Pt has been doing grocery online ordering and delivery.     Socioeconomic History     Marital status: Single     Spouse name: Not on file     Number of children: Not on file     Years of education: Not on file     Highest education level: Not on file       Advance Care Plan/Directive- " On file     Referrals Placed: Mental Health, County Resources, Transportation     Goals:   Goals        General    Mental Health Management (pt-stated)     Notes - Note edited  3/30/2020  2:30 PM by Beatriz Webber, [facility]    Goal Statement: I will have my [facility] waiver assessment within one month   Date Goal set: 3/30/20  Barriers: wait time  Strengths: resilient and motivated to get services in place   Date to Achieve By: 4/30/20  Patient expressed understanding of goal: yes  Action steps to achieve this goal:  1. [facility]  Willa Bull who can be reached at [phone]  2. I will engage in the assessment and identify and inform of ADL and IADL stressors   3. I will get results in a timely manner after the assessment is complete             Patient/Caregiver understanding: yes      Plan: Created goal to get [facility] waiver reassessment completed, engage in the assessment and complete all necessary paperwork- if she qualifies will open up opportunities for in home supportive services to meet pt's psychosocial needs. Reminded pt to set up annual physical. WIll plan to reconnect within 2-3 weeks.     Beatriz Webber', MSW, [facility]  Clinic Care Coordinator  [email or web address]  [phone]
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
## 2020-03-30 NOTE — TELEPHONE ENCOUNTER
3/30/20 letter printed and mailed to patient.    Babar Bravo,   [facility]  [phone]

## 2020-03-30 NOTE — LETTER
[facility] [address] [place] [address]  March 30, 2020    Suzi Mckoy  [address]    [facility] [address]      Dear Suzi,    I am a clinic care coordinator who works with Avelina Quinonez NP at [facility]. I wanted to thank you for spending the time to talk with me.  Below is a description of clinic care coordination and how I can further assist you.      The clinic care coordinator team is made up of a registered nurse,  and community health worker who understand the health care system. The goal of clinic care coordination is to help you manage your health and improve access to the health care system in the most efficient manner. The team can assist you in meeting your health care goals by providing education, coordinating services, strengthening the communication among your providers  and supporting you with any resource needs.    Please feel free to contact me, at [phone] with any questions or concerns. We are focused on providing you with the highest-quality healthcare experience possible and that all starts with you.     Sincerely,     Beatriz Lemus, MSW, [facility]  Clinic Care Coordinator  [email or web address]  [phone]    Enclosed: I have enclosed a copy of the Complex Care Plan. This has helpful information and goals that we have talked about. Please keep this in an easy to access place to use as needed.

## 2020-04-06 ENCOUNTER — VIRTUAL VISIT (OUTPATIENT)
Dept: FAMILY MEDICINE | Facility: CLINIC | Age: 50
End: 2020-04-06

## 2020-04-06 DIAGNOSIS — R05.9 COUGH: Primary | ICD-10-CM

## 2020-04-06 PROCEDURE — 99214 OFFICE O/P EST MOD 30 MIN: CPT | Mod: GT | Performed by: NURSE PRACTITIONER

## 2020-04-06 RX ORDER — FLUTICASONE PROPIONATE 50 MCG
1 SPRAY, SUSPENSION (ML) NASAL DAILY
Qty: 16 G | Refills: 3 | Status: SHIPPED | OUTPATIENT
Start: 2020-04-06 | End: 2022-10-04

## 2020-04-06 RX ORDER — CETIRIZINE HYDROCHLORIDE 10 MG/1
10 TABLET ORAL DAILY
Qty: 30 TABLET | Refills: 3 | Status: SHIPPED | OUTPATIENT
Start: 2020-04-06 | End: 2022-10-04
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
## 2020-04-22 ENCOUNTER — PATIENT OUTREACH (OUTPATIENT)
Dept: CARE COORDINATION | Facility: CLINIC | Age: 50
End: 2020-04-22

## 2020-04-22 ASSESSMENT — ACTIVITIES OF DAILY LIVING (ADL): DEPENDENT_IADLS:: TRANSPORTATION

## 2020-04-22 NOTE — PROGRESS NOTES
"Clinic Care Coordination Contact    Follow Up Progress Note      Assessment: ROLANDO Care Coordinator called patient to check in. She stated she is feeling much better and improved after taking cough medicine.     ROLANDO RADFORD asked patient if she had her county reassessment to get services- patient stated \"I don't know I had a  call me to discuss things yesterday\". She noted her  is \"Sylvie\" and she can be reached at [phone].     Patient noted she was living with a friend in his apartment in [place] but they were \"kicked out\". Since then, she is living with him in his van. Her friends name is \"William\". She stated that William had a CADI waiver and services but everything closed now. She noted she is interested in moving back to [place] area where she grew up. They are currently driving around there and sleeping in the van until they can figure out housing.    Patient has been using the [employer] in [place] to get food and also going through drive thru's. She continues to get social security income. Patient is on the wait list for section 8. She understands she would need to switch to [facility] if she moves there for waiver services.     Gave a few resources to search for housing- housing link, [facility] housing resources.    After ROLANDO RADFORD hung up, left a follow up  Voicemail to see if patient is open to a 3 way call with her  to collaborate and also so ROLANDO RADFORD can have verbal KEAGAN to exchange info with her .    Patient noted she is hesitant to go to a shelter. She noted \"they take all the money you do get to live there\" and she wants to social distance from others as much as possible right now with the COVID crisis going on. Noted to try and call the [facility] to see if a hotel placement could be facilitated.    Goals addressed this encounter:   Goals Addressed                 This Visit's Progress       Patient Stated      Mental Health Management (pt-stated)   On " "track     Goal Statement: [facility]   Date Goal set: 3/30/20  Barriers: wait time  Strengths: resilient and motivated to get services in place   Date to Achieve By: 4/30/20  Patient expressed understanding of goal: yes  Action steps to achieve this goal:  1. I will have my CADI reasessment with  Willa Bull who can be reached at [phone]  2. I will engage in the assessment and identify and inform of ADL and IADL stressors   3. I will get results in a timely manner after the assessment is complete     As of today's date 4/22/2020 goal is met at 26 - 50%.   Goal Status:  Ongoing. Called pt today. She said she was assigned a  \"Sylvie\" at [phone] but she has \"no idea\" the status of her services. She is currently living in a van with a friend and [place] for housing [place]. ROLANDO CC asked patient in a follow up voicemail if care coordinator can 3 way call to  to get verbal KEAGAN to speak and collaborate with . Will continue to follow.           Other      Changes in Lifestyle        Goal Statement: I will secure safe long term housing within 4 months   Date Goal set: 4/22/20  Barriers: on wait list for section 8, does not want to go to a shelter placement   Strengths: has social security income, may qualify for subsidized housing  Date to Achieve By: 8/22/20  Patient expressed understanding of goal: yes  Action steps to achieve this goal:  1. I will work with [facility]  to figure out a plan  2. I will use resources such as Housing Link to reach out to options  3. I will see if I can qualify for emergency assistance or other funding for deposits etc.             Intervention/Education provided during outreach: housing resources, county resources, community resources     Outreach Frequency: weekly    Plan:  Care Coordinator will follow up with patient in one week to go over progress. " Routed update to PCP.

## 2020-05-16 ENCOUNTER — HOSPITAL ENCOUNTER (EMERGENCY)
Facility: CLINIC | Age: 50
Discharge: HOME | End: 2020-05-16
Attending: EMERGENCY MEDICINE | Admitting: EMERGENCY MEDICINE
Payer: MEDICARE

## 2020-05-16 VITALS
WEIGHT: 135 LBS | TEMPERATURE: 97.2 F | SYSTOLIC BLOOD PRESSURE: 108 MMHG | DIASTOLIC BLOOD PRESSURE: 60 MMHG | OXYGEN SATURATION: 100 % | HEIGHT: 63 IN | HEART RATE: 87 BPM | BODY MASS INDEX: 23.92 KG/M2 | RESPIRATION RATE: 16 BRPM

## 2020-05-16 DIAGNOSIS — Z30.432 ENCOUNTER FOR IUD REMOVAL: ICD-10-CM

## 2020-05-16 LAB
ALBUMIN UR-MCNC: 30 MG/DL
APPEARANCE UR: CLEAR
BACTERIA #/AREA URNS HPF: ABNORMAL /HPF
BILIRUB UR QL STRIP: NEGATIVE
COLOR UR AUTO: YELLOW
GLUCOSE UR STRIP-MCNC: NEGATIVE MG/DL
HCG UR QL: NEGATIVE
HGB UR QL STRIP: ABNORMAL
KETONES UR STRIP-MCNC: NEGATIVE MG/DL
LEUKOCYTE ESTERASE UR QL STRIP: NEGATIVE
MUCOUS THREADS #/AREA URNS LPF: PRESENT /LPF
NITRATE UR QL: NEGATIVE
PH UR STRIP: 6 PH (ref 5–7)
RBC #/AREA URNS AUTO: 28 /HPF (ref 0–2)
SOURCE: ABNORMAL
SP GR UR STRIP: 1.03 (ref 1–1.03)
SQUAMOUS #/AREA URNS AUTO: 3 /HPF (ref 0–1)
UROBILINOGEN UR STRIP-MCNC: 4 MG/DL (ref 0–2)
WBC #/AREA URNS AUTO: 4 /HPF (ref 0–5)

## 2020-05-16 PROCEDURE — 81001 URINALYSIS AUTO W/SCOPE: CPT | Performed by: EMERGENCY MEDICINE

## 2020-05-16 PROCEDURE — 99284 EMERGENCY DEPT VISIT MOD MDM: CPT | Mod: 25 | Performed by: EMERGENCY MEDICINE

## 2020-05-16 PROCEDURE — 81025 URINE PREGNANCY TEST: CPT | Performed by: EMERGENCY MEDICINE

## 2020-05-16 PROCEDURE — 58301 REMOVE INTRAUTERINE DEVICE: CPT | Performed by: EMERGENCY MEDICINE

## 2020-05-16 PROCEDURE — 58301 REMOVE INTRAUTERINE DEVICE: CPT | Mod: Z6 | Performed by: EMERGENCY MEDICINE

## 2020-05-16 ASSESSMENT — MIFFLIN-ST. JEOR: SCORE: 1211.49
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
## 2020-05-16 NOTE — DISCHARGE INSTRUCTIONS
Return to the emergency department for heavy vaginal bleeding, lightheadedness, abdominal pain, or other concerns.  Otherwise follow-up in clinic to discuss ongoing birth control.

## 2020-05-16 NOTE — ED NOTES
"IUD removed, offered prescription for birth control, pt declined, \"nope, if I get pregnant I'll be happy\"  "

## 2020-05-16 NOTE — ED NOTES
"C/o pelvic discomfort one month duration, states is related to IUD. IUD placed about a year ago.  Wants IUD removed due to discomfort. She cannot feel the strings but her boyfriend can feel them. States, \"I do have grandkids but I may want to have another baby some day, you never know\"  Denies spotting, discharge, or odor  Called her PCP but cannot get an appointment due to covid19    "

## 2020-05-16 NOTE — ED AVS SNAPSHOT
[facility] Department  [address] [address]  Phone:  [phone]  Fax:  [phone]                                    Suzi Mckoy   MRN: [id number]    Department:  [facility]   Date of Visit:  5/16/2020           After Visit Summary Signature Page    I have received my discharge instructions, and my questions have been answered. I have discussed any challenges I see with this plan with the nurse or doctor.    ..........................................................................................................................................  Patient/Patient Representative Signature      ..........................................................................................................................................  Patient Representative Print Name and Relationship to Patient    ..................................................               ................................................  Date                                   Time    ..........................................................................................................................................  Reviewed by Signature/Title    ...................................................              ..............................................  Date                                               Time          22EPIC Rev 08/18

## 2020-05-18 ENCOUNTER — PATIENT OUTREACH (OUTPATIENT)
Dept: CARE COORDINATION | Facility: CLINIC | Age: 50
End: 2020-05-18

## 2020-05-18 SDOH — ECONOMIC STABILITY: TRANSPORTATION INSECURITY
IN THE PAST 12 MONTHS, HAS LACK OF TRANSPORTATION KEPT YOU FROM MEETINGS, WORK, OR FROM GETTING THINGS NEEDED FOR DAILY LIVING?: NO

## 2020-05-18 SDOH — ECONOMIC STABILITY: FOOD INSECURITY: WITHIN THE PAST 12 MONTHS, THE FOOD YOU BOUGHT JUST DIDN'T LAST AND YOU DIDN'T HAVE MONEY TO GET MORE.: NEVER TRUE

## 2020-05-18 SDOH — ECONOMIC STABILITY: TRANSPORTATION INSECURITY
IN THE PAST 12 MONTHS, HAS THE LACK OF TRANSPORTATION KEPT YOU FROM MEDICAL APPOINTMENTS OR FROM GETTING MEDICATIONS?: NO

## 2020-05-18 SDOH — ECONOMIC STABILITY: FOOD INSECURITY: WITHIN THE PAST 12 MONTHS, YOU WORRIED THAT YOUR FOOD WOULD RUN OUT BEFORE YOU GOT MONEY TO BUY MORE.: SOMETIMES TRUE

## 2020-05-18 SDOH — ECONOMIC STABILITY: INCOME INSECURITY: HOW HARD IS IT FOR YOU TO PAY FOR THE VERY BASICS LIKE FOOD, HOUSING, MEDICAL CARE, AND HEATING?: NOT VERY HARD

## 2020-05-18 NOTE — PROGRESS NOTES
Clinic Care Coordination Contact  [facility]/Voicemail       Clinical Data: Care Coordinator Outreaching to go over progress on psychosocial goal to get new housing in place.     Per chart review, patient did have an Ed visit on 5/16/20 for IUD removal at [facility]. She would like to start on oral birth control and is sexually active with a male partner. She wants to talk to PCP about this. She was offered to get a prescription for oral birth control through ED but patient declined. She does not have any future appointments set up at the clinic. Routed update to PCP.    Outreach attempted x 1.  Left message on patient's voicemail with call back information and requested return call.  Plan: Care Coordinator will attempt to reach patient again in 1-2 weeks. Encouraged to call back.    Beatriz Lemus, MSW, [facility]  Clinic Care Coordinator  [email or web address]  [phone]

## 2020-05-19 ENCOUNTER — PATIENT OUTREACH (OUTPATIENT)
Dept: CARE COORDINATION | Facility: CLINIC | Age: 50
End: 2020-05-19

## 2020-05-19 NOTE — PROGRESS NOTES
"Clinic Care Coordination Contact    Follow Up Progress Note      Assessment: Patient called care coordinator. Her and her friend \"William\" are still living in a van. Patient notes she refuses to go to a shelter and wants to find long term housing. She did apply for section 8 for [facility] and [place]. She has a list of subsidized apartment complexes and she has been trying to call with no leads so far.     ROLANDO RADFORD tried to call patient's waiver  \"Sylvie\" at [phone] with verbal permission from pt and patient on the line, left a voicemail. ROLANDO RADFORD also called with patient to [facility]'s homeless crisis response team and left a voicemail requesting a call back.    Patient continues to utilize the food shelf. She has not showered recently and states she has been finding bathroom's to freshen up in. Discussed most shelter's offer a shower.     ROLANDO Care Coordinator will continue to follow and try to help patient navigate housing options.    Goals addressed this encounter:   Goals Addressed                 This Visit's Progress       Patient Stated      Mental Health Management (pt-stated)   20%     2- Goal Statement: I will have my [facility] assessment within one month   Date Goal set: 3/30/20  Barriers: wait time  Strengths: resilient and motivated to get services in place   Date to Achieve By: 8/1/20  Patient expressed understanding of goal: yes  Action steps to achieve this goal:  1. I will have my CADI reasessment with  Willa Bull who can be reached at [phone]  2. I will engage in the assessment and identify and inform of ADL and IADL stressors   3. I will get results in a timely manner after the assessment is complete     As of today's date 4/22/2020 goal is met at 26 - 50%.   Goal Status:  Ongoing. Called pt today. She said she was assigned a  \"Sylvie\" at [phone] but she has \"no idea\" the status of her services. She is currently living in a van " with a friend and searching for housing in [facility]. ROLANDO RADFORD asked patient in a follow up voicemail if care coordinator can 3 way call to  to get verbal KEAGAN to speak and collaborate with . Will continue to follow.      As of today's date 5/19/2020 goal is met at 26 - 50%.   Goal Status:  Showing progress. ROLANDO RADFORD left a VM for Sylvie () again today- with pt on the phone with verbal permission. Plan to ask what status is of services. Extended timeline.          Other      Changes in Lifestyle   On track     1-Goal Statement: I will secure safe long term housing within 4 months   Date Goal set: 4/22/20  Barriers: on wait list for section 8, does not want to go to a shelter placement   Strengths: has social security income, may qualify for subsidized housing  Date to Achieve By: 8/22/20  Patient expressed understanding of goal: yes  Action steps to achieve this goal:  1. I will work with [facility]  to figure out a plan  2. I will use resources such as Housing Link to reach out to options  3. I will see if I can qualify for emergency assistance or other funding for deposits etc.    As of today's date 5/19/2020 goal is met at 0 - 25%.   Goal Status:  Ongoing               Plan: ROLANDO Care Coordinator will continue to follow and outreach weekly for now as patient is trying to navigate finding long term housing.    Beatriz Lemus, MSW, [facility]  Clinic Care Coordinator  [email or web address]  [phone]

## 2020-05-21 NOTE — PROGRESS NOTES
"Clinic Care Coordination Contact  Care Team Conversations    ROLANDO Care Coordinator received a voicemail from Sylvie at [facility]. She indicated she is patient's waiver . She stated she has been trying to reach patient as she has set up some services to help her. She has set up a housing  to help her find/secure new housing. She has also set up nursing visits to help patient set up her medication box and manage her medications. She would like to arrange a care conference via phone with patient. ROLANDO RADFORD left her a voicemail in follow up to arrange this. Sylvie noted Friday working best in her schedule.    ROLANDO RADFORD also received a follow up voicemail from \"Josh\" at [facility] homeless outreach. She noted that there are shelter options such as [facility] in [facility], or [facility] in [place]. [facility] can help with damage deposits. She also suggested searching for housing on Housing Link. The section 8 wait list is closed in [facility].     ROLANDO Care Coordinator will continue to follow. At this point, will await care conference with waiver .    Beatriz Lemus, MSW, [facility]  Clinic Care Coordinator  [email or web address]  [phone]    "

## 2020-05-22 ENCOUNTER — PATIENT OUTREACH (OUTPATIENT)
Dept: CARE COORDINATION | Facility: CLINIC | Age: 50
End: 2020-05-22

## 2020-05-22 NOTE — PROGRESS NOTES
Clinic Care Coordination Contact  [facility]/Voicemail       Clinical Data: Care Coordinator Outreaching with pt's waiver CM to go over progress on psychosocial goals. Sylvie has some services set up for patient but has been unable to reach patient.  Outreach attempted x 1.  Left message on patient's voicemail with call back information and requested return call.  Plan: Care Coordinator tried to call patient today with patient's waiver CM Sylvie but patient didn't answer. Will try and collaborate and reach patient again.     Beatriz Lemus, MSW, [facility]  Clinic Care Coordinator  [email or web address]  [phone]

## 2020-05-26 ENCOUNTER — PATIENT OUTREACH (OUTPATIENT)
Dept: CARE COORDINATION | Facility: CLINIC | Age: 50
End: 2020-05-26

## 2020-05-26 ENCOUNTER — HOSPITAL ENCOUNTER (EMERGENCY)
Facility: CLINIC | Age: 50
Discharge: HOME | End: 2020-05-26
Attending: FAMILY MEDICINE | Admitting: FAMILY MEDICINE
Payer: MEDICARE

## 2020-05-26 VITALS
SYSTOLIC BLOOD PRESSURE: 115 MMHG | WEIGHT: 135 LBS | BODY MASS INDEX: 23.92 KG/M2 | OXYGEN SATURATION: 97 % | HEART RATE: 89 BPM | DIASTOLIC BLOOD PRESSURE: 67 MMHG | TEMPERATURE: 98.3 F | RESPIRATION RATE: 16 BRPM | HEIGHT: 63 IN

## 2020-05-26 DIAGNOSIS — N63.0 LUMP OR MASS IN BREAST: ICD-10-CM

## 2020-05-26 LAB — HCG UR QL: NEGATIVE

## 2020-05-26 PROCEDURE — 99283 EMERGENCY DEPT VISIT LOW MDM: CPT | Performed by: FAMILY MEDICINE

## 2020-05-26 PROCEDURE — 81025 URINE PREGNANCY TEST: CPT | Performed by: FAMILY MEDICINE

## 2020-05-26 PROCEDURE — 99284 EMERGENCY DEPT VISIT MOD MDM: CPT | Mod: Z6 | Performed by: FAMILY MEDICINE

## 2020-05-26 ASSESSMENT — MIFFLIN-ST. JEOR: SCORE: 1211.49

## 2020-05-26 NOTE — DISCHARGE INSTRUCTIONS
ICD-10-CM    1. Lump or mass in breast  N63.0 HCG qualitative urine    follow-up with primary provider to schedule  a mammogram and possible ultrasound

## 2020-05-26 NOTE — ED NOTES
Pt here with concerns over a lump in her breast, noticed 2 days ago. States a family hx of cancer.

## 2020-05-26 NOTE — ED AVS SNAPSHOT
[facility] Emergency Department  [address] [address]  Phone:  [phone]  Fax:  [phone]                                    Suzi Mckoy   MRN: [id number]    Department:  [facility] Emergency Department   Date of Visit:  5/26/2020           After Visit Summary Signature Page    I have received my discharge instructions, and my questions have been answered. I have discussed any challenges I see with this plan with the nurse or doctor.    ..........................................................................................................................................  Patient/Patient Representative Signature      ..........................................................................................................................................  Patient Representative Print Name and Relationship to Patient    ..................................................               ................................................  Date                                   Time    ..........................................................................................................................................  Reviewed by Signature/Title    ...................................................              ..............................................  Date                                               Time          22EPIC Rev 08/18

## 2020-05-26 NOTE — PROGRESS NOTES
Clinic Care Coordination Contact  Care Team Conversations     Care Coordinator received an email from Sylvie Webb (, [facility], [facility]). She noted she was able to speak with Suzi last week. She will have her sign a KEAGAN and fax to clinic. She got her aquainted with the housing access worker Ara and they did an intake. Patient is searching for a 2 bedroom apartment. Also, she got an independent living skills worker in place for Suzi.     ROLANDO RADFORD feels bases are covered now on patient having a  and services in place to help meet her basic needs. ROLANDO RADFORD inquired on if patient or  can just reach out if there are any needs that care coordinator can help with. Will await response before closing care coordination outreach.    Beatirz Lemus, MSW, [facility]  Clinic Care Coordinator  [email or web address]  [phone]
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
## 2020-05-28 ENCOUNTER — OFFICE VISIT (OUTPATIENT)
Dept: FAMILY MEDICINE | Facility: CLINIC | Age: 50
End: 2020-05-28
Payer: MEDICARE

## 2020-05-28 VITALS
TEMPERATURE: 97.6 F | BODY MASS INDEX: 22.86 KG/M2 | HEART RATE: 78 BPM | DIASTOLIC BLOOD PRESSURE: 78 MMHG | SYSTOLIC BLOOD PRESSURE: 104 MMHG | WEIGHT: 129 LBS | OXYGEN SATURATION: 98 % | HEIGHT: 63 IN

## 2020-05-28 DIAGNOSIS — D24.2 BENIGN NEOPLASM OF LEFT BREAST: ICD-10-CM

## 2020-05-28 DIAGNOSIS — N63.0 LUMP OR MASS IN BREAST: Primary | ICD-10-CM

## 2020-05-28 DIAGNOSIS — G40.909 SEIZURE DISORDER (H): ICD-10-CM

## 2020-05-28 PROCEDURE — 99213 OFFICE O/P EST LOW 20 MIN: CPT | Performed by: NURSE PRACTITIONER

## 2020-05-28 ASSESSMENT — MIFFLIN-ST. JEOR: SCORE: 1184.27

## 2020-05-28 NOTE — PATIENT INSTRUCTIONS
Schedule mammogram and breast ultrasound: [phone]      Schedule with neurology: [phone]          Thank you for choosing [facility].  You may be receiving an email and/or telephone survey request from [facility] Customer Experience regarding your visit today.  Please take a few minutes to respond to the survey to let us know how we are doing.      If you have questions or concerns, please contact us via [employer] or you can contact your care team at [phone].    Our Clinic hours are:  Monday 6:40 am  to 7:00 pm  Tuesday -Friday 6:40 am to 5:00 pm    The [place] outpatient lab hours are:  Monday - Friday 6:10 am to 4:45 pm  Saturdays 7:00 am to 11:00 am  Appointments are required, call [phone]    If you have clinical questions after hours or would like to schedule an appointment,  call the clinic at [phone].

## 2020-05-28 NOTE — PROGRESS NOTES
"Subjective     Suzi Mckoy is a 50 year old female who presents to clinic today for the following health issues:    HPI   ED/UC Followup:    Facility:  [facility]  Date of visit: 5/26/2020  Reason for visit: lump/mass in breast  Current Status: patient here to establish care with a primary  Needs to have mammogram and ultrasound ordered  Left breast mass  Family history of breast cancer - MGM, doesn't know mothers history       Just moved to [place].  New left breast lump noted 2 weeks ago.  Hasn't changed since it was found.  Patient doesn't remember when her last mammogram was.  Poor historian - boyfriend answering most questions.  Patient has a complex history of seizure disorders.  Reports having 2 brain surgeries at a late-teen.                Reviewed and updated as needed this visit by Provider         Review of Systems   Constitutional, HEENT, cardiovascular, pulmonary, gi and gu systems are negative, except as otherwise noted.      Objective    /78 (BP Location: Right arm)   Pulse 78   Temp 97.6  F (36.4  C) (Tympanic)   Ht 1.6 m (5' 3\")   Wt 58.5 kg (129 lb)   LMP  (LMP Unknown)   SpO2 98%   BMI 22.85 kg/m    Body mass index is 22.85 kg/m .  Physical Exam   GENERAL: healthy, alert and no distress  BREAST: left breast - 1 cm semi-firm nodule in the 7 o'clock position near the sternal border.  NEURO: Poor historian, poor short term memory - kept asking the same questions over and over.            Assessment & Plan       ICD-10-CM    1. Lump or mass in breast  N63.0 MA Diagnostic Digital Left     US Breast Left Limited 1-3 Quadrants   2. Benign neoplasm of left breast   D24.2 US Breast Left Limited 1-3 Quadrants     3. Seizure disorder (H)  G40.909 NEUROLOGY ADULT REFERRAL - needs to establish care              Return in about 3 months (around 8/28/2020).      The risks, benefits and treatment options of prescribed medications or other treatments have been discussed with the patient. " The patient verbalized their understanding and should call or follow up if no improvement or if they develop further problems.    IOANA Shine [facility]

## 2020-06-01 ENCOUNTER — TELEPHONE (OUTPATIENT)
Dept: FAMILY MEDICINE | Facility: CLINIC | Age: 50
End: 2020-06-01

## 2020-06-01 NOTE — TELEPHONE ENCOUNTER
I called the diagnostics dept and they can get in sooner at [place] or [place]. Left message for them to call [phone] to change appt if they desire.  Nikki Rondon RN

## 2020-06-01 NOTE — TELEPHONE ENCOUNTER
Reason for call:  Patient reporting a symptom    Symptom or request: Pt's friend calling - No KEAGAN on file.  He states that MARLENA Rodriguez saw pt 5/28 for a breast lump and an appt for a mammo and US were set for 6/17.  He states that someone was supposed to call pt back and get her in earlier for tests? They would like a call back ASAP.  Please call patient and advise @ either [phone] or [phone]    Duration (how long have symptoms been present): ongoing    Have you been treated for this before? Yes    Additional comments:     Phone Number patient can be reached at:  Home number on file [phone] (home)    Best Time:  any    Can we leave a detailed message on this number:  YES    Call taken on 6/1/2020 at 1:23 PM by Karissa Lafleur

## 2020-06-04 ENCOUNTER — HOSPITAL ENCOUNTER (EMERGENCY)
Facility: CLINIC | Age: 50
Discharge: HOME | End: 2020-06-04
Attending: EMERGENCY MEDICINE | Admitting: EMERGENCY MEDICINE
Payer: MEDICARE

## 2020-06-04 VITALS
DIASTOLIC BLOOD PRESSURE: 61 MMHG | TEMPERATURE: 98.7 F | SYSTOLIC BLOOD PRESSURE: 109 MMHG | RESPIRATION RATE: 16 BRPM | OXYGEN SATURATION: 98 %

## 2020-06-04 DIAGNOSIS — Z00.00 WELL ADULT EXAM: ICD-10-CM

## 2020-06-04 DIAGNOSIS — E86.0 DEHYDRATION: ICD-10-CM

## 2020-06-04 PROCEDURE — 99282 EMERGENCY DEPT VISIT SF MDM: CPT | Mod: Z6 | Performed by: EMERGENCY MEDICINE

## 2020-06-04 PROCEDURE — 99282 EMERGENCY DEPT VISIT SF MDM: CPT | Performed by: EMERGENCY MEDICINE

## 2020-06-04 NOTE — DISCHARGE INSTRUCTIONS
Turn if symptoms worsen or new symptoms develop.  Drink plenty of fluids.  If any dizziness headaches, visual changes, neck pain, chest pain, abdominal pain, back pain, focal numbness weakness any extremity or other symptoms present please return for further evaluation and care.  You appear to be in good health and have normal vital signs.  There is no evidence of dehydration at this time.

## 2020-06-04 NOTE — ED NOTES
"Pt here for dehydration assessment. Patient states she doesn't want to be here. \"I just want to get the hell out of here\". \"I just came here cause the  told me to, what the hell is taking so long?\" RN spoke with provider. Vitals completed. Discharge pending.   "

## 2020-06-04 NOTE — ED AVS SNAPSHOT
[facility] Emergency Department  [address] [address]  Phone:  [phone]  Fax:  [phone]                                    Suzi Mckoy   MRN: [id number]    Department:  [facility] Emergency Department   Date of Visit:  6/4/2020           After Visit Summary Signature Page    I have received my discharge instructions, and my questions have been answered. I have discussed any challenges I see with this plan with the nurse or doctor.    ..........................................................................................................................................  Patient/Patient Representative Signature      ..........................................................................................................................................  Patient Representative Print Name and Relationship to Patient    ..................................................               ................................................  Date                                   Time    ..........................................................................................................................................  Reviewed by Signature/Title    ...................................................              ..............................................  Date                                               Time          22EPIC Rev 08/18
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
## 2020-06-04 NOTE — ED TRIAGE NOTES
Pt comes to ER with significant other, is homeless, was told by [facility]  to be checked for dehydration as it is the procedure before pt can get a hotel room. Pt says she feels fine, has been living in a van with air conditioning. Pt says she has been urinating today fine. Pt has concerns about return of vaginal CA. Pt is transitioning from care provider in [facility] but hasn't seen anyone here yet for primary.

## 2020-06-05 ENCOUNTER — DOCUMENTATION ONLY (OUTPATIENT)
Dept: OTHER | Facility: CLINIC | Age: 50
End: 2020-06-05

## 2020-06-08 ENCOUNTER — PATIENT OUTREACH (OUTPATIENT)
Dept: CARE COORDINATION | Facility: CLINIC | Age: 50
End: 2020-06-08

## 2020-06-08 NOTE — PROGRESS NOTES
"Clinic Care Coordination Contact  [facility]/Voicemail       Clinical Data: Care Coordinator Outreaching to go over progress on finding long term housing. Patient had an ED visit on 6/4/20 to check for dehydration or any medical concerns prior to possible arrangement to stay at a hotel by [facility]. Patient was discharged from the ED with no concerns.     Outreach attempted x 1.  Left message on patient's voicemail with call back information and requested return call.     Care Coordinator did email pt's waiver  \"Sylvie Webb\" to give an update as well.    Plan: Care Coordinator will schedule chart review/outreach for one week. Encouraged to call back.     Beatriz Lemus, MSW, [facility]  Clinic Care Coordinator  [email or web address]  [phone]    "

## 2020-06-10 ENCOUNTER — NURSE TRIAGE (OUTPATIENT)
Dept: NURSING | Facility: CLINIC | Age: 50
End: 2020-06-10

## 2020-06-10 NOTE — TELEPHONE ENCOUNTER
Patient is calling regarding lumps in the vaginal/perineal area. Permission given to talk to her boyfriend William Blankenship. He was able to also ask her questions directly during the triage process.   Patient has breast lumps and is scheduled for a mammogram and ultrasound of the breast on 6/17.   She also has now noticed lumps in the vaginal/perineal area. She had an IUD type device removed on 6/4. This information was per the patient and boyfriend.The doctor didn't mention any notice of the perineal lumps at that time. There is no pain or itching connected with the lumps in the perineal area. She did have some discharge after the removal of the device.   Patient had a small seizure during the call. She was lying on the couch and was safe at the time. Boyfriend states that this is common and she is on medication for the seizures.   Patient is hoping to see a doctor prior to her scheduled mammogram and breast ultrasound on 6/17. They are hoping to have both areas of the body checked and properly assessed (breast lumps and vaginal lumps).    Additional Information    Negative: Followed a genital area injury    Negative: Foreign body in vagina (e.g., tampon)    Negative: Vaginal bleeding is main symptom    Negative: Vaginal discharge is main symptom    Negative: Pain or burning with passing urine (urination) is main symptom    Negative: Menstrual cramps is main symptom    Negative: Abdomen pain is main symptom    Negative: Pubic lice suspected    Negative: Itching or rash of external female genital area (vulva)    Negative: Patient sounds very sick or weak to the triager    Negative: [1] SEVERE pain AND [2] not improved 2 hours after pain medicine    Negative: [1] Genital area looks infected (e.g., draining sore, spreading redness) AND [2] fever    Negative: [1] Something is hanging out of the vagina AND [2] can't easily be pushed back inside    [1] Rash (e.g., redness, tiny bumps, sore) of genital area AND [2] present >  "24 hours    Answer Assessment - Initial Assessment Questions  1. SYMPTOM: \"What's the main symptom you're concerned about?\" (e.g., pain, itching, dryness)      Vaginal lumps  2. LOCATION: \"Where is the  lump located?\" (e.g., inside/outside, left/right)      Outside of vagina in the perineal area  3. ONSET: \"When did the  lumps  start?\"      1 week ago  4. PAIN: \"Is there any pain?\" If so, ask: \"How bad is it?\" (Scale: 1-10; mild, moderate, severe)      No pain  5. ITCHING: \"Is there any itching?\" If so, ask: \"How bad is it?\" (Scale: 1-10; mild, moderate, severe)      no  6. CAUSE: \"What do you think is causing the discharge?\" \"Have you had the same problem before? What happened then?\"     discharge just when they removed iud type device was removed  7. OTHER SYMPTOMS: \"Do you have any other symptoms?\" (e.g., fever, itching, vaginal bleeding, pain with urination, injury to genital area, vaginal foreign body)      No fever,   8. PREGNANCY: \"Is there any chance you are pregnant?\" \"When was your last menstrual period?\"      no    Protocols used: VAGINAL SYMPTOMS-A-AH    COVID 19 Nurse Triage Plan/Patient Instructions    Please be aware that novel coronavirus (COVID-19) may be circulating in the community. If you develop symptoms such as fever, cough, or SOB or if you have concerns about the presence of another infection including coronavirus (COVID-19), please contact your health care provider or visit [email or web address].     Disposition/Instructions    Patient to schedule an In Person Visit with provider. Reference Visit Selection Guide.    Thank you for taking steps to prevent the spread of this virus.  o Limit your contact with others.  o Wear a simple mask to cover your cough.  o Wash your hands well and often.    Resources    [facility]: About COVID-19: [email or web address]    CDC: What to Do If You're Sick: www.cdc.gov/coronavirus/2019-ncov/about/steps-when-sick.html    CDC: Ending Home Isolation: " www.cdc.gov/coronavirus/2019-ncov/hcp/disposition-in-home-patients.html     CDC: Caring for Someone: www.cdc.gov/coronavirus/2019-ncov/if-you-are-sick/care-for-someone.html     [facility]: Interim Guidance for Hospital Discharge to Home: www.[facility].mn.us/diseases/coronavirus/hcp/hospdischarge.pdf    [facility] clinical trials (COVID-19 research studies): clinicalaffairs.[email or web address]/n-clinical-trials     Below are the COVID-19 hotlines at the [place] Department of [facility] ([facility]). Interpreters are available.   o For health questions: Call 930-147-8967 or 1-974.761.6407 (7 a.m. to 7 p.m.)  o For questions about schools and childcare: Call 772-957-5346 or 1-855.319.7905 (7 a.m. to 7 p.m.)       Mari Stack RN on 6/10/2020 at 3:15 PM
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
## 2020-06-12 ENCOUNTER — OFFICE VISIT (OUTPATIENT)
Dept: OBGYN | Facility: CLINIC | Age: 50
End: 2020-06-12
Payer: MEDICARE

## 2020-06-12 VITALS
WEIGHT: 125 LBS | HEART RATE: 70 BPM | DIASTOLIC BLOOD PRESSURE: 60 MMHG | BODY MASS INDEX: 22.14 KG/M2 | SYSTOLIC BLOOD PRESSURE: 108 MMHG

## 2020-06-12 DIAGNOSIS — N63.20 LEFT BREAST MASS: ICD-10-CM

## 2020-06-12 DIAGNOSIS — N90.89 VULVAR LUMP: Primary | ICD-10-CM

## 2020-06-12 PROCEDURE — 99213 OFFICE O/P EST LOW 20 MIN: CPT | Performed by: OBSTETRICS & GYNECOLOGY

## 2020-06-16 ENCOUNTER — PATIENT OUTREACH (OUTPATIENT)
Dept: CARE COORDINATION | Facility: CLINIC | Age: 50
End: 2020-06-16

## 2020-06-16 NOTE — PROGRESS NOTES
"Clinic Care Coordination Contact    Follow Up Progress Note      Assessment: Patient called care coordinator stating that she is taking her seizure medication but she has had an increase in small seizures over the past few weeks. She notes when they were living in the van she put her medications under the bed but she thinks they may have gotten hot and went bad. ROLANDO  encouraged patient to call her neurologist she said it's \"Dr. Hernandez\" and possibly get a refill or advisement on what to do for medication management. Patient indicated she would.    They are tentatively allowed to stay at the hotel for one month and they have been there two weeks (her and her partner William). They have been purchasing food with social security money at the local [facility]. The hotel provides breakfast. They are looking into subsidized housing in the area but have come across no leads. Patient reports William was offered a maintenance or caretaker job at [employer] but they have not decided on that, it would be $19 an hour for maintenance job or free rent if he is a caretaker. They are communicating with the owner.    Patient has waiver services for independent living skills and a housing . Patient is mostly talking with them via phone for now due to covid pandemic.     Patient intends to keep the same PCP for a while yet but may look at switching at some point in the future to a different provider.    Patient continues to give verbal permission to speak with Sylvie Webb- waiver . Left Sylvie BARBER with an update.    Goals addressed this encounter:   Goals Addressed                 This Visit's Progress      Changes in Lifestyle   On track     1-Goal Statement: I will secure safe long term housing within 4 months   Date Goal set: 4/22/20  Barriers: on wait list for section 8, does not want to go to a shelter placement   Strengths: has social security income, may qualify for subsidized housing  Date to " Achieve By: 8/22/20  Patient expressed understanding of goal: yes  Action steps to achieve this goal:  1. I will work with [facility]  to figure out a plan  2. I will use resources such as Housing Link to reach out to options  3. I will see if I can qualify for emergency assistance or other funding for deposits etc.    As of today's date 5/19/2020 goal is met at 0 - 25%.   Goal Status:  Ongoing As of today's date 5/26/2020 goal is met at 26 - 50%.   Goal Status:  Showing progress. Pt now has a housing access worker helping her to find and secure housing.     As of today's date 6/8/2020 goal is met at 0 - 25%.   Goal Status:  Ongoing. Pt had ED visit  6/4/20 to check for dehydration or any medical concerns prior to potential placement in a hotel arranged by [facility]. Left a VM for patient.     As of today's date 6/16/2020 goal is met at 0 - 25%.   Goal Status:  Showing progress. Staying at a hotel arranged by [facility] tentative for a month. Working with housing  to find housing. Has [facility]  also helping. Patient looking into subsidized options in [facility]               PLAN:  Care Coordinator will schedule chart review/outreach for 2-3 weeks and collaborate with [facility]  on housing if needed.    Beatriz Lemus, MSW, [facility]  Clinic Care Coordinator  [email or web address]  [phone]

## 2020-06-17 ENCOUNTER — HOSPITAL ENCOUNTER (OUTPATIENT)
Dept: ULTRASOUND IMAGING | Facility: CLINIC | Age: 50
End: 2020-06-17
Attending: NURSE PRACTITIONER
Payer: MEDICARE

## 2020-06-17 ENCOUNTER — HOSPITAL ENCOUNTER (OUTPATIENT)
Dept: MAMMOGRAPHY | Facility: CLINIC | Age: 50
End: 2020-06-17
Attending: NURSE PRACTITIONER
Payer: MEDICARE

## 2020-06-17 DIAGNOSIS — N63.0 LUMP OR MASS IN BREAST: ICD-10-CM

## 2020-06-17 DIAGNOSIS — Z11.59 ENCOUNTER FOR SCREENING FOR OTHER VIRAL DISEASES: Primary | ICD-10-CM

## 2020-06-17 DIAGNOSIS — N63.24 BREAST LUMP ON LEFT SIDE AT 7 O'CLOCK POSITION: ICD-10-CM

## 2020-06-17 DIAGNOSIS — D24.2 BENIGN NEOPLASM OF LEFT BREAST: ICD-10-CM

## 2020-06-17 PROCEDURE — 76642 ULTRASOUND BREAST LIMITED: CPT | Mod: LT

## 2020-06-17 PROCEDURE — 76642 ULTRASOUND BREAST LIMITED: CPT | Mod: 50

## 2020-06-17 PROCEDURE — 77066 DX MAMMO INCL CAD BI: CPT
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
## 2020-06-22 DIAGNOSIS — Z11.59 ENCOUNTER FOR SCREENING FOR OTHER VIRAL DISEASES: ICD-10-CM

## 2020-06-22 PROCEDURE — 99207 ZZC NO CHARGE NURSE ONLY: CPT

## 2020-06-22 NOTE — LETTER
June 23, 2020        Suzi COLLINS Ean  [address]   [facility] [place] [address]    This letter provides a written record that you were tested for COVID-19 on 6/22/20.       Your result was negative. This means that we didn t find the virus that causes COVID-19 in your sample. A test may show negative when you do actually have the virus. This can happen when the virus is in the early stages of infection, before you feel illness symptoms.    If you have symptoms   Stay home and away from others (self-isolate) until you meet ALL of the guidelines below:    You ve had no fever--and no medicine that reduces fever--for 3 full days (72 hours). And      Your other symptoms have gotten better. For example, your cough or breathing has improved. And     At least 10 days have passed since your symptoms started.    During this time:    Stay home. Don t go to work, school or anywhere else.     Stay in your own room, including for meals. Use your own bathroom if you can.    Stay away from others in your home. No hugging, kissing or shaking hands. No visitors.    Clean  high touch  surfaces often (doorknobs, counters, handles, etc.). Use a household cleaning spray or wipes. You can find a full list on the EPA website at www.epa.gov/pesticide-registration/list-n-disinfectants-use-against-sars-cov-2.    Cover your mouth and nose with a mask, tissue or washcloth to avoid spreading germs.    Wash your hands and face often with soap and water.    Going back to work  Check with your employer for any guidelines to follow for going back to work.    Employers: This document serves as formal notice that your employee tested negative for COVID-19, as of the testing date shown above.

## 2020-06-23 LAB
SARS-COV-2 RNA SPEC QL NAA+PROBE: NOT DETECTED
SPECIMEN SOURCE: NORMAL

## 2020-06-26 DIAGNOSIS — Z11.59 ENCOUNTER FOR SCREENING FOR OTHER VIRAL DISEASES: Primary | ICD-10-CM

## 2020-06-30 DIAGNOSIS — Z11.59 ENCOUNTER FOR SCREENING FOR OTHER VIRAL DISEASES: ICD-10-CM

## 2020-06-30 PROCEDURE — 99207 ZZC NO CHARGE NURSE ONLY: CPT

## 2020-06-30 NOTE — LETTER
July 1, 2020        Suzi KARINA Ean  [address]   [facility] [address]    This letter provides a written record that you were tested for COVID-19 on 6/30/20.       Your result was negative. This means that we didn t find the virus that causes COVID-19 in your sample. A test may show negative when you do actually have the virus. This can happen when the virus is in the early stages of infection, before you feel illness symptoms.    If you have symptoms   Stay home and away from others (self-isolate) until you meet ALL of the guidelines below:    You ve had no fever--and no medicine that reduces fever--for 3 full days (72 hours). And      Your other symptoms have gotten better. For example, your cough or breathing has improved. And     At least 10 days have passed since your symptoms started.    During this time:    Stay home. Don t go to work, school or anywhere else.     Stay in your own room, including for meals. Use your own bathroom if you can.    Stay away from others in your home. No hugging, kissing or shaking hands. No visitors.    Clean  high touch  surfaces often (doorknobs, counters, handles, etc.). Use a household cleaning spray or wipes. You can find a full list on the EPA website at www.epa.gov/pesticide-registration/list-n-disinfectants-use-against-sars-cov-2.    Cover your mouth and nose with a mask, tissue or washcloth to avoid spreading germs.    Wash your hands and face often with soap and water.    Going back to work  Check with your employer for any guidelines to follow for going back to work.    Employers: This document serves as formal notice that your employee tested negative for COVID-19, as of the testing date shown above.

## 2020-07-01 LAB
SARS-COV-2 PCR COMMENT: NORMAL
SARS-COV-2 RNA SPEC QL NAA+PROBE: NEGATIVE
SARS-COV-2 RNA SPEC QL NAA+PROBE: NORMAL
SPECIMEN SOURCE: NORMAL
SPECIMEN SOURCE: NORMAL

## 2020-07-03 ENCOUNTER — HOSPITAL ENCOUNTER (OUTPATIENT)
Dept: ULTRASOUND IMAGING | Facility: CLINIC | Age: 50
End: 2020-07-03
Attending: NURSE PRACTITIONER
Payer: MEDICARE

## 2020-07-03 ENCOUNTER — HOSPITAL ENCOUNTER (OUTPATIENT)
Dept: MAMMOGRAPHY | Facility: CLINIC | Age: 50
End: 2020-07-03
Attending: NURSE PRACTITIONER
Payer: MEDICARE

## 2020-07-03 DIAGNOSIS — N63.24 BREAST LUMP ON LEFT SIDE AT 7 O'CLOCK POSITION: ICD-10-CM

## 2020-07-03 DIAGNOSIS — N63.0 LUMP OR MASS IN BREAST: ICD-10-CM

## 2020-07-03 PROCEDURE — 27210206 US BREAST BIOPSY CORE NEEDLE LEFT

## 2020-07-03 PROCEDURE — 88341 IMHCHEM/IMCYTCHM EA ADD ANTB: CPT | Mod: 26 | Performed by: RADIOLOGY

## 2020-07-03 PROCEDURE — 40000986 MA POST PROCEDURE RIGHT

## 2020-07-03 PROCEDURE — 88305 TISSUE EXAM BY PATHOLOGIST: CPT | Performed by: RADIOLOGY

## 2020-07-03 PROCEDURE — 88342 IMHCHEM/IMCYTCHM 1ST ANTB: CPT | Performed by: RADIOLOGY

## 2020-07-03 PROCEDURE — 40000986 MA POST PROCEDURE LEFT

## 2020-07-03 PROCEDURE — 88305 TISSUE EXAM BY PATHOLOGIST: CPT | Mod: 26,76 | Performed by: RADIOLOGY

## 2020-07-03 PROCEDURE — 27210206 US BREAST BIOPSY CORE NEEDLE RIGHT

## 2020-07-03 PROCEDURE — 88342 IMHCHEM/IMCYTCHM 1ST ANTB: CPT | Mod: 26 | Performed by: RADIOLOGY

## 2020-07-03 PROCEDURE — 88341 IMHCHEM/IMCYTCHM EA ADD ANTB: CPT | Performed by: RADIOLOGY

## 2020-07-03 PROCEDURE — 88360 TUMOR IMMUNOHISTOCHEM/MANUAL: CPT | Mod: 26 | Performed by: RADIOLOGY

## 2020-07-03 PROCEDURE — 25000125 ZZHC RX 250: Performed by: RADIOLOGY

## 2020-07-03 PROCEDURE — 88360 TUMOR IMMUNOHISTOCHEM/MANUAL: CPT | Performed by: RADIOLOGY

## 2020-07-03 RX ADMIN — LIDOCAINE HYDROCHLORIDE 14 ML: 10 INJECTION, SOLUTION EPIDURAL; INFILTRATION; INTRACAUDAL; PERINEURAL at 13:58

## 2020-07-03 NOTE — IP AVS SNAPSHOT
[facility]  [address]  [facility] [address]  Phone:  [phone]                                    After Visit Summary   7/3/2020    Suzi Mckoy    MRN: [id number]           After Visit Summary Signature Page    I have received my discharge instructions, and my questions have been answered. I have discussed any challenges I see with this plan with the nurse or doctor.    ..........................................................................................................................................  Patient/Patient Representative Signature      ..........................................................................................................................................  Patient Representative Print Name and Relationship to Patient    ..................................................               ................................................  Date                                   Time    ..........................................................................................................................................  Reviewed by Signature/Title    ...................................................              ..............................................  Date                                               Time          22EPIC Rev 08/18

## 2020-07-03 NOTE — DISCHARGE INSTRUCTIONS
Breast Biopsy Care Instructions      Site Care:    You may have some discomfort in the breast and may see some bruising at the needle site.    You will be given an ice pack which should be kept in place over the dressing until bedtime tonight.Always keep a gauze pad between your skin and the ice pack.    Wearing your bra continuously for 24 hours post biopsy will give optimal support to the biopsy site.    Activity:       You may go back to your normal routine.     No heavy lifting for 24 hours.    Diet and Medications:       You may go back to your regular diet.     Tylenol is the best choice to relieve your discomfort, the first 24 hours. If you have no bleeding on the first day, Ibuprofen (such as Advil, or Motrin), may be taken on the second day after for pain.     Do not take aspirin for 72 hours following your biopsy.    Questions:     If you have any questions about your procedure performed in Ultrasound, you may contact us at [phone].If you have any questions about your procedure performed in Mammography, you may contact us at [phone].    Results:       The pathology report on your biopsy is usually available within 72 hours. The Radiologist or your personal physician will call you with the results.     You will receive information about any further follow up from your physician.    Call your doctor if you have:       More than slight bleeding or significant pain, or experience swelling of the breast.     If your physician is unavailable, please call the Nurse Advice Line at [phone], or [facility] Emergency Department at [phone].      Patient:______________________________  Time:_________  Date:_____________    Instructor:____________________________   Time:_________  Date:_____________

## 2020-07-08 LAB — COPATH REPORT: NORMAL

## 2020-07-09 ENCOUNTER — PATIENT OUTREACH (OUTPATIENT)
Dept: CARE COORDINATION | Facility: CLINIC | Age: 50
End: 2020-07-09

## 2020-07-10 ENCOUNTER — TELEPHONE (OUTPATIENT)
Dept: MAMMOGRAPHY | Facility: CLINIC | Age: 50
End: 2020-07-10

## 2020-07-30 ENCOUNTER — TELEPHONE (OUTPATIENT)
Dept: FAMILY MEDICINE | Facility: CLINIC | Age: 50
End: 2020-07-30

## 2020-07-31 NOTE — TELEPHONE ENCOUNTER
[facility] - ICD 10 request received, completed and routed to AGUILAR Rodriguez for review and signature.

## 2020-08-19 ENCOUNTER — PATIENT OUTREACH (OUTPATIENT)
Dept: CARE COORDINATION | Facility: CLINIC | Age: 50
End: 2020-08-19

## 2020-08-19 NOTE — PROGRESS NOTES
Clinic Care Coordination Contact    Situation: Patient chart reviewed by care coordinator.    Assessment: Patient transferred care from [facility] to Beatriz Rodriguez.  Care Coordinator will plan to do no further outreach at this time due to this. Pt does have a waiver  Sylvie Webb that continues to be heavily involved in getting her services to meet her needs and help figure out a plan for housing.     Plan/Recommendations: Discontinued goal tracking due to transition of care to different PCP.  Care Coordinator will schedule no further outreach at this time.    Beatriz Lemus, MSW, [facility]  Clinic Care Coordinator  [email or web address]  [phone]

## 2020-08-26 ENCOUNTER — HOSPITAL ENCOUNTER (EMERGENCY)
Facility: CLINIC | Age: 50
Discharge: HOME | End: 2020-08-26
Attending: EMERGENCY MEDICINE | Admitting: EMERGENCY MEDICINE
Payer: MEDICARE

## 2020-08-26 VITALS
SYSTOLIC BLOOD PRESSURE: 104 MMHG | TEMPERATURE: 97.7 F | BODY MASS INDEX: 22.14 KG/M2 | HEART RATE: 87 BPM | DIASTOLIC BLOOD PRESSURE: 80 MMHG | OXYGEN SATURATION: 99 % | RESPIRATION RATE: 16 BRPM | WEIGHT: 125 LBS

## 2020-08-26 DIAGNOSIS — R56.9 SEIZURES (H): ICD-10-CM

## 2020-08-26 DIAGNOSIS — G40.909 SEIZURE DISORDER (H): ICD-10-CM

## 2020-08-26 DIAGNOSIS — F44.5 CONVERSION DISORDER WITH SEIZURES OR CONVULSIONS: ICD-10-CM

## 2020-08-26 PROBLEM — Z86.59 HISTORY OF SUICIDAL IDEATION: Status: ACTIVE | Noted: 2020-05-18

## 2020-08-26 PROBLEM — F33.9 MAJOR DEPRESSIVE DISORDER, RECURRENT, UNSPECIFIED (H): Status: ACTIVE | Noted: 2019-09-19

## 2020-08-26 PROBLEM — F60.89 OTHER SPECIFIC PERSONALITY DISORDERS (H): Status: ACTIVE | Noted: 2019-09-19

## 2020-08-26 PROBLEM — F31.9 BIPOLAR DISORDER (H): Status: ACTIVE | Noted: 2020-05-18

## 2020-08-26 PROBLEM — Z98.890 HISTORY OF CRANIOTOMY: Status: ACTIVE | Noted: 2020-05-18

## 2020-08-26 PROBLEM — I69.354 HEMIPLEGIA AND HEMIPARESIS FOLLOWING CEREBRAL INFARCTION AFFECTING LEFT NON-DOMINANT SIDE (H): Status: ACTIVE | Noted: 2019-09-19

## 2020-08-26 LAB
ALBUMIN SERPL-MCNC: 3.9 G/DL (ref 3.4–5)
ALP SERPL-CCNC: 46 U/L (ref 40–150)
ALT SERPL W P-5'-P-CCNC: 13 U/L (ref 0–50)
ANION GAP SERPL CALCULATED.3IONS-SCNC: 5 MMOL/L (ref 3–14)
AST SERPL W P-5'-P-CCNC: 14 U/L (ref 0–45)
BASOPHILS # BLD AUTO: 0 10E9/L (ref 0–0.2)
BASOPHILS NFR BLD AUTO: 0.6 %
BILIRUB SERPL-MCNC: 0.2 MG/DL (ref 0.2–1.3)
BUN SERPL-MCNC: 10 MG/DL (ref 7–30)
CALCIUM SERPL-MCNC: 8.7 MG/DL (ref 8.5–10.1)
CHLORIDE SERPL-SCNC: 108 MMOL/L (ref 94–109)
CO2 SERPL-SCNC: 28 MMOL/L (ref 20–32)
CREAT SERPL-MCNC: 0.68 MG/DL (ref 0.52–1.04)
DIFFERENTIAL METHOD BLD: ABNORMAL
EOSINOPHIL # BLD AUTO: 0.1 10E9/L (ref 0–0.7)
EOSINOPHIL NFR BLD AUTO: 0.7 %
ERYTHROCYTE [DISTWIDTH] IN BLOOD BY AUTOMATED COUNT: 13.9 % (ref 10–15)
GFR SERPL CREATININE-BSD FRML MDRD: >90 ML/MIN/{1.73_M2}
GLUCOSE SERPL-MCNC: 66 MG/DL (ref 70–99)
HCT VFR BLD AUTO: 38.3 % (ref 35–47)
HGB BLD-MCNC: 11.9 G/DL (ref 11.7–15.7)
IMM GRANULOCYTES # BLD: 0 10E9/L (ref 0–0.4)
IMM GRANULOCYTES NFR BLD: 0.3 %
LYMPHOCYTES # BLD AUTO: 1.4 10E9/L (ref 0.8–5.3)
LYMPHOCYTES NFR BLD AUTO: 19.5 %
MCH RBC QN AUTO: 30.4 PG (ref 26.5–33)
MCHC RBC AUTO-ENTMCNC: 31.1 G/DL (ref 31.5–36.5)
MCV RBC AUTO: 98 FL (ref 78–100)
MONOCYTES # BLD AUTO: 0.5 10E9/L (ref 0–1.3)
MONOCYTES NFR BLD AUTO: 7.8 %
NEUTROPHILS # BLD AUTO: 4.9 10E9/L (ref 1.6–8.3)
NEUTROPHILS NFR BLD AUTO: 71.1 %
NRBC # BLD AUTO: 0 10*3/UL
NRBC BLD AUTO-RTO: 0 /100
PLATELET # BLD AUTO: 394 10E9/L (ref 150–450)
POTASSIUM SERPL-SCNC: 4.3 MMOL/L (ref 3.4–5.3)
PROT SERPL-MCNC: 7.5 G/DL (ref 6.8–8.8)
RBC # BLD AUTO: 3.92 10E12/L (ref 3.8–5.2)
SODIUM SERPL-SCNC: 141 MMOL/L (ref 133–144)
WBC # BLD AUTO: 6.9 10E9/L (ref 4–11)

## 2020-08-26 PROCEDURE — 80177 DRUG SCRN QUAN LEVETIRACETAM: CPT | Performed by: EMERGENCY MEDICINE

## 2020-08-26 PROCEDURE — 80053 COMPREHEN METABOLIC PANEL: CPT | Performed by: EMERGENCY MEDICINE

## 2020-08-26 PROCEDURE — 99283 EMERGENCY DEPT VISIT LOW MDM: CPT | Performed by: EMERGENCY MEDICINE

## 2020-08-26 PROCEDURE — 99282 EMERGENCY DEPT VISIT SF MDM: CPT | Mod: Z6 | Performed by: EMERGENCY MEDICINE

## 2020-08-26 PROCEDURE — 80175 DRUG SCREEN QUAN LAMOTRIGINE: CPT | Performed by: EMERGENCY MEDICINE

## 2020-08-26 PROCEDURE — 85025 COMPLETE CBC W/AUTO DIFF WBC: CPT | Performed by: EMERGENCY MEDICINE

## 2020-08-26 NOTE — ED AVS SNAPSHOT
[facility] Emergency Department  [address] [facility] [address]  Phone:  [phone]  Fax:  [phone]                                    Suzi Mckoy   MRN: [id number]    Department:  [facility] Emergency Department   Date of Visit:  8/26/2020           After Visit Summary Signature Page    I have received my discharge instructions, and my questions have been answered. I have discussed any challenges I see with this plan with the nurse or doctor.    ..........................................................................................................................................  Patient/Patient Representative Signature      ..........................................................................................................................................  Patient Representative Print Name and Relationship to Patient    ..................................................               ................................................  Date                                   Time    ..........................................................................................................................................  Reviewed by Signature/Title    ...................................................              ..............................................  Date                                               Time          22EPIC Rev 08/18
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
## 2020-08-26 NOTE — DISCHARGE INSTRUCTIONS
Follow-up with your wavered  Sylvie, and complete the application for [facility] residency in [place] (follow-up with Lester regarding this).

## 2020-08-27 LAB
LAMOTRIGINE SERPL-MCNC: 11.5 UG/ML (ref 2.5–15)
LEVETIRACETAM SERPL-MCNC: 51 UG/ML (ref 12–46)

## 2020-08-28 NOTE — RESULT ENCOUNTER NOTE
Left voicemail message requesting a call back to [facility] ED Lab Result RN at [phone].  RN is available every day between 10 a.m. and 6:30 p.m..

## 2020-08-28 NOTE — RESULT ENCOUNTER NOTE
Final Levetiracetam (Keppra) level is 51 ug/mL and this level is [ELEVATED].    Normal reference range for Levetiracetam (Keppra) is 12.0 to 46.0 ug/mL  Resulted after [facility] ED visit on 8/26/20 (date).  Patient to be notified of result and advised to relay result to their Neurologist or physician who manages the medication dosing.

## 2020-09-20 ENCOUNTER — HOSPITAL ENCOUNTER (EMERGENCY)
Facility: CLINIC | Age: 50
Discharge: HOME | End: 2020-09-20
Attending: NURSE PRACTITIONER | Admitting: NURSE PRACTITIONER
Payer: MEDICARE

## 2020-09-20 ENCOUNTER — APPOINTMENT (OUTPATIENT)
Dept: GENERAL RADIOLOGY | Facility: CLINIC | Age: 50
End: 2020-09-20
Attending: NURSE PRACTITIONER
Payer: MEDICARE

## 2020-09-20 VITALS
DIASTOLIC BLOOD PRESSURE: 85 MMHG | RESPIRATION RATE: 16 BRPM | TEMPERATURE: 98 F | WEIGHT: 135 LBS | HEART RATE: 81 BPM | BODY MASS INDEX: 23.91 KG/M2 | OXYGEN SATURATION: 100 % | SYSTOLIC BLOOD PRESSURE: 129 MMHG

## 2020-09-20 DIAGNOSIS — M25.531 RIGHT WRIST PAIN: ICD-10-CM

## 2020-09-20 PROCEDURE — G0463 HOSPITAL OUTPT CLINIC VISIT: HCPCS | Performed by: NURSE PRACTITIONER

## 2020-09-20 PROCEDURE — 29125 APPL SHORT ARM SPLINT STATIC: CPT | Mod: RT | Performed by: NURSE PRACTITIONER

## 2020-09-20 PROCEDURE — 99213 OFFICE O/P EST LOW 20 MIN: CPT | Mod: Z6 | Performed by: NURSE PRACTITIONER

## 2020-09-20 PROCEDURE — 73110 X-RAY EXAM OF WRIST: CPT | Mod: RT

## 2020-09-20 ASSESSMENT — ENCOUNTER SYMPTOMS
ARTHRALGIAS: 1
JOINT SWELLING: 1
WEAKNESS: 0
NUMBNESS: 0
NAUSEA: 0
BACK PAIN: 0
EYE REDNESS: 0
NECK STIFFNESS: 0
CHILLS: 0
LIGHT-HEADEDNESS: 0
FATIGUE: 0
MYALGIAS: 1
FEVER: 0
COUGH: 0
ABDOMINAL PAIN: 0
HEADACHES: 0
NECK PAIN: 0
SHORTNESS OF BREATH: 0
DIZZINESS: 0
EYE DISCHARGE: 0
VOMITING: 0

## 2020-09-20 NOTE — ED AVS SNAPSHOT
[facility] Emergency Department  [address] [address]  Phone:  [phone]  Fax:  [phone]                                    Suzi Mckoy   MRN: [id number]    Department:  [facility] Emergency Department   Date of Visit:  9/20/2020           After Visit Summary Signature Page    I have received my discharge instructions, and my questions have been answered. I have discussed any challenges I see with this plan with the nurse or doctor.    ..........................................................................................................................................  Patient/Patient Representative Signature      ..........................................................................................................................................  Patient Representative Print Name and Relationship to Patient    ..................................................               ................................................  Date                                   Time    ..........................................................................................................................................  Reviewed by Signature/Title    ...................................................              ..............................................  Date                                               Time          22EPIC Rev 08/18

## 2020-09-20 NOTE — ED TRIAGE NOTES
Seizure yesterday hit wrist on something. Lateral pain. Caty Domínguez LPN on 9/20/2020 at 12:31 PM
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
## 2020-09-23 ENCOUNTER — NURSE TRIAGE (OUTPATIENT)
Dept: NURSING | Facility: CLINIC | Age: 50
End: 2020-09-23

## 2020-09-23 ENCOUNTER — HOSPITAL ENCOUNTER (EMERGENCY)
Facility: CLINIC | Age: 50
Discharge: HOME | End: 2020-09-23
Attending: PHYSICIAN ASSISTANT | Admitting: PHYSICIAN ASSISTANT
Payer: MEDICARE

## 2020-09-23 VITALS
BODY MASS INDEX: 21.85 KG/M2 | RESPIRATION RATE: 16 BRPM | TEMPERATURE: 97.7 F | HEART RATE: 118 BPM | SYSTOLIC BLOOD PRESSURE: 111 MMHG | OXYGEN SATURATION: 100 % | HEIGHT: 64 IN | WEIGHT: 128 LBS | DIASTOLIC BLOOD PRESSURE: 64 MMHG

## 2020-09-23 DIAGNOSIS — Z76.0 ENCOUNTER FOR MEDICATION REFILL: ICD-10-CM

## 2020-09-23 DIAGNOSIS — G40.909 SEIZURE DISORDER (H): ICD-10-CM

## 2020-09-23 DIAGNOSIS — R56.9 SEIZURES (H): ICD-10-CM

## 2020-09-23 DIAGNOSIS — G40.919 BREAKTHROUGH SEIZURE (H): ICD-10-CM

## 2020-09-23 DIAGNOSIS — Z98.890 HISTORY OF CRANIOTOMY: ICD-10-CM

## 2020-09-23 DIAGNOSIS — F44.5 CONVERSION DISORDER WITH SEIZURES OR CONVULSIONS: ICD-10-CM

## 2020-09-23 PROCEDURE — G0463 HOSPITAL OUTPT CLINIC VISIT: HCPCS | Performed by: PHYSICIAN ASSISTANT

## 2020-09-23 PROCEDURE — 99214 OFFICE O/P EST MOD 30 MIN: CPT | Mod: Z6 | Performed by: PHYSICIAN ASSISTANT

## 2020-09-23 RX ORDER — LAMOTRIGINE 100 MG/1
200 TABLET ORAL 2 TIMES DAILY
Qty: 28 TABLET | Refills: 0 | Status: SHIPPED | OUTPATIENT
Start: 2020-09-23 | End: 2021-02-18

## 2020-09-23 RX ORDER — FELBAMATE 600 MG/1
600 TABLET ORAL 3 TIMES DAILY
Qty: 21 TABLET | Refills: 0 | Status: SHIPPED | OUTPATIENT
Start: 2020-09-23 | End: 2021-02-18

## 2020-09-23 RX ORDER — LEVETIRACETAM 500 MG/1
1000 TABLET ORAL 3 TIMES DAILY
Qty: 42 TABLET | Refills: 0 | Status: SHIPPED | OUTPATIENT
Start: 2020-09-23 | End: 2021-02-18

## 2020-09-23 ASSESSMENT — ENCOUNTER SYMPTOMS
CONFUSION: 0
EYE REDNESS: 0
FEVER: 0
HEADACHES: 0
ABDOMINAL PAIN: 0
NECK STIFFNESS: 0
SEIZURES: 1
DIFFICULTY URINATING: 0
ARTHRALGIAS: 0
SHORTNESS OF BREATH: 0
COLOR CHANGE: 0

## 2020-09-23 ASSESSMENT — MIFFLIN-ST. JEOR: SCORE: 1187.66

## 2020-09-23 NOTE — ED AVS SNAPSHOT
[facility] Emergency Department  [address] [address]  Phone:  [phone]  Fax:  [phone]                                    Suzi Mckoy   MRN: [id number]    Department:  [facility] Emergency Department   Date of Visit:  9/23/2020           After Visit Summary Signature Page    I have received my discharge instructions, and my questions have been answered. I have discussed any challenges I see with this plan with the nurse or doctor.    ..........................................................................................................................................  Patient/Patient Representative Signature      ..........................................................................................................................................  Patient Representative Print Name and Relationship to Patient    ..................................................               ................................................  Date                                   Time    ..........................................................................................................................................  Reviewed by Signature/Title    ...................................................              ..............................................  Date                                               Time          22EPIC Rev 08/18

## 2020-09-23 NOTE — DISCHARGE INSTRUCTIONS
I gave you 7 days worth of your seizure medications and sent the prescriptions to the [facility] pharmacy.     Neurology referral in discharge paperwork: check to see if this referral works or if you need one from your primary care provider.     Follow up with Dr. Dolan primary care provider in [place] [facility] on Monday 9/28/20 at 10:20am for further management of medications.     Return to the Emergency Room if persistent seizures occur.

## 2020-09-24 ENCOUNTER — TELEPHONE (OUTPATIENT)
Dept: NEUROLOGY | Facility: CLINIC | Age: 50
End: 2020-09-24

## 2020-09-24 NOTE — TELEPHONE ENCOUNTER
Pt left VM stating she needs to find a new neurologist to refill her prescriptions. She has recently moved to this area and her old neurologist will no longer provide refills for her. She states she only has 3 days left of her medications.     Contact pt @: [phone]    Denise Behrendt  [facility] CSS

## 2020-09-24 NOTE — TELEPHONE ENCOUNTER
Left message for patient to return call to clinic.  What medications does she take for seizures?  Need to know if generic or brand name.  Kassandra CARRASCO RN BSN
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
## 2020-09-24 NOTE — TELEPHONE ENCOUNTER
Left message on answering machine for patient to call back.  Left message on answering machine for patient to call back.      A provider cannot refill medications until a patient is seen to establish care.    Pt will need to schedule soonest avail appt and then discuss bridging of medications with PCP until can establish care.    Malka TORRES   [facility]

## 2020-09-24 NOTE — TELEPHONE ENCOUNTER
"Darryl calling from [facility] w/ following message.  Patient was seen by multiple providers today.   Corrina Krueger is calling b/c patient needs brand name anti-seizure med but for brand name meds, a PA is needed.  Corrina Krueger tried calling patient to see if she can take a generic med but patient did not answer.    Patient has an appointment w/ Dr. Serrano to establish care so pharmacy would like a call back to find out which medication would be ordered.      Corrina krueger says patient needs one of these meds: Keppra, Lamictal or Felbatol    Ariella Huber RN/[facility], 9/23/20, 7:39 PM.    Reason for Disposition    Pharmacy calling with prescription questions and triager unable to answer question    Additional Information    Negative: MORE THAN A DOUBLE DOSE of a prescription or over-the-counter (OTC) drug    Negative: [1] DOUBLE DOSE (an extra dose or lesser amount) of over-the-counter (OTC) drug AND [2] any symptoms (e.g., dizziness, nausea, pain, sleepiness)    Negative: [1] DOUBLE DOSE (an extra dose or lesser amount) of prescription drug AND [2] any symptoms (e.g., dizziness, nausea, pain, sleepiness)    Negative: Took another person's prescription drug    Negative: [1] DOUBLE DOSE (an extra dose or lesser amount) of prescription drug AND [2] NO symptoms (Exception: a double dose of antibiotics)    Negative: Diabetes drug error or overdose (e.g., insulin or extra dose)    Negative: [1] Request for URGENT new prescription or refill of \"essential\" medication (i.e., likelihood of harm to patient if not taken) AND [2] triager unable to fill per unit policy    Negative: [1] Prescription not at pharmacy AND [2] was prescribed today by PCP    Protocols used: MEDICATION QUESTION CALL-A-AH      "

## 2020-09-28 NOTE — TELEPHONE ENCOUNTER
Never met the patient and she NO SHOW for her appt today so I am afraid I cannot address her prescription issues.

## 2020-10-25 ENCOUNTER — HOSPITAL ENCOUNTER (EMERGENCY)
Facility: CLINIC | Age: 50
End: 2020-10-25
Payer: MEDICARE

## 2020-10-28 ENCOUNTER — PRE VISIT (OUTPATIENT)
Dept: NEUROLOGY | Facility: CLINIC | Age: 50
End: 2020-10-28

## 2020-10-28 NOTE — TELEPHONE ENCOUNTER
FUTURE VISIT INFORMATION:    Date: 11/4/20    Time:  1440    Location: [facility]   REFERRAL INFORMATION:    Referring provider:  [facility] ED    Referring providers clinic:     Reason for visit/diagnosis:  Seizures       NOTES (FOR ALL VISITS) STATUS DETAILS   OFFICE NOTE from referring provider Printed    OFFICE NOTE from other specialist Printed  Dr. Gonzales () 11/12/18   DISCHARGE SUMMARY from hospital  Printed  [facility] 12/19/17  [place] 9/18/19   DISCHARGE REPORT from the ER Printed [facility] 7/6/10  [place] 8/26/20, 9/23/20     MEDICATION LIST In Epic     IMAGING  (FOR ALL VISITS)       EMG       EEG    None   MRI (HEAD, NECK, SPINE) Printed  Images in PACs CT head 9/18/19   CT cervical 9/18/19   CARDIAC STUDIES        FORMAL COGNITIVE TESTING       OTHER                                    --Will request records from [facility] (??)

## 2020-10-30 ENCOUNTER — HOSPITAL ENCOUNTER (EMERGENCY)
Facility: CLINIC | Age: 50
End: 2020-10-30
Payer: MEDICARE

## 2020-10-30 ENCOUNTER — HOSPITAL ENCOUNTER (EMERGENCY)
Facility: CLINIC | Age: 50
Discharge: HOME | End: 2020-10-30
Attending: PHYSICIAN ASSISTANT | Admitting: PHYSICIAN ASSISTANT
Payer: MEDICARE

## 2020-10-30 VITALS
TEMPERATURE: 98.6 F | RESPIRATION RATE: 18 BRPM | HEART RATE: 100 BPM | WEIGHT: 128 LBS | OXYGEN SATURATION: 98 % | SYSTOLIC BLOOD PRESSURE: 120 MMHG | DIASTOLIC BLOOD PRESSURE: 65 MMHG | BODY MASS INDEX: 22.32 KG/M2

## 2020-10-30 DIAGNOSIS — Z20.822 EXPOSURE TO COVID-19 VIRUS: ICD-10-CM

## 2020-10-30 PROCEDURE — G0463 HOSPITAL OUTPT CLINIC VISIT: HCPCS | Performed by: PHYSICIAN ASSISTANT

## 2020-10-30 PROCEDURE — C9803 HOPD COVID-19 SPEC COLLECT: HCPCS | Performed by: PHYSICIAN ASSISTANT

## 2020-10-30 PROCEDURE — U0003 INFECTIOUS AGENT DETECTION BY NUCLEIC ACID (DNA OR RNA); SEVERE ACUTE RESPIRATORY SYNDROME CORONAVIRUS 2 (SARS-COV-2) (CORONAVIRUS DISEASE [COVID-19]), AMPLIFIED PROBE TECHNIQUE, MAKING USE OF HIGH THROUGHPUT TECHNOLOGIES AS DESCRIBED BY CMS-2020-01-R: HCPCS | Performed by: PHYSICIAN ASSISTANT

## 2020-10-30 PROCEDURE — 99213 OFFICE O/P EST LOW 20 MIN: CPT | Performed by: PHYSICIAN ASSISTANT
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
## 2020-10-30 NOTE — ED AVS SNAPSHOT
[facility] Emergency Dept  [address] [address]  Phone: [phone]  Fax: [phone]                                    Suzi Mckoy   MRN: [id number]    Department: [facility] Emergency Dept   Date of Visit: 10/30/2020           After Visit Summary Signature Page    I have received my discharge instructions, and my questions have been answered. I have discussed any challenges I see with this plan with the nurse or doctor.    ..........................................................................................................................................  Patient/Patient Representative Signature      ..........................................................................................................................................  Patient Representative Print Name and Relationship to Patient    ..................................................               ................................................  Date                                   Time    ..........................................................................................................................................  Reviewed by Signature/Title    ...................................................              ..............................................  Date                                               Time          22EPIC Rev 08/18

## 2020-10-31 LAB
LABORATORY COMMENT REPORT: NORMAL
SARS-COV-2 RNA SPEC QL NAA+PROBE: NEGATIVE
SARS-COV-2 RNA SPEC QL NAA+PROBE: NORMAL
SPECIMEN SOURCE: NORMAL
SPECIMEN SOURCE: NORMAL

## 2020-11-04 ENCOUNTER — VIRTUAL VISIT (OUTPATIENT)
Dept: NEUROLOGY | Facility: CLINIC | Age: 50
End: 2020-11-04
Attending: PHYSICIAN ASSISTANT
Payer: MEDICARE

## 2020-11-04 DIAGNOSIS — G40.909 SEIZURE DISORDER (H): Primary | ICD-10-CM

## 2020-11-04 PROCEDURE — 99442 PR PHYSICIAN TELEPHONE EVALUATION 11-20 MIN: CPT | Performed by: PSYCHIATRY & NEUROLOGY

## 2020-11-04 NOTE — PROGRESS NOTES
"Suzi Mckoy is a 50 year old female who is being evaluated via a billable telephone visit.      The patient has been notified of following:     \"This telephone visit will be conducted via a call between you and your physician/provider. We have found that certain health care needs can be provided without the need for a physical exam.  This service lets us provide the care you need with a short phone conversation.  If a prescription is necessary we can send it directly to your pharmacy.  If lab work is needed we can place an order for that and you can then stop by our lab to have the test done at a later time.    Telephone visits are billed at different rates depending on your insurance coverage. During this emergency period, for some insurers they may be billed the same as an in-person visit.  Please reach out to your insurance provider with any questions.    If during the course of the call the physician/provider feels a telephone visit is not appropriate, you will not be charged for this service.\"    Patient has given verbal consent for Telephone visit?  Yes    What phone number would you like to be contacted at? [phone]    How would you like to obtain your AVS? Mail a copy    Phone call duration: 15 minutes    *Please see the physician note accompanying this visit for details.    Anni eHnao MD      "
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
## 2020-11-04 NOTE — PATIENT INSTRUCTIONS
Plan:  -- Continue current antiepileptic medications for now, as prescribed by your previous neurologist.  -- We will need to obtain more of your records, including the recent neurology notes.  It would be helpful if you could reach out to Dr. Hernandez's office to provide consent to release the records to us.  -- I am concerned about your seizures not being under control despite being on 3 medications to prevent them. It sounds like your history has been quite complicated over the years, so I am going to recommend that you make an appointment with an Epilepsy specialist.  I have put in the referral.  -- We we will see you in our clinic on November 19, as scheduled.
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
## 2020-11-19 ENCOUNTER — VIRTUAL VISIT (OUTPATIENT)
Dept: NEUROLOGY | Facility: CLINIC | Age: 50
End: 2020-11-19
Payer: MEDICARE

## 2020-11-19 DIAGNOSIS — G40.909 SEIZURE DISORDER (H): Primary | ICD-10-CM

## 2020-11-19 DIAGNOSIS — Z79.899 ENCOUNTER FOR LONG-TERM (CURRENT) USE OF MEDICATIONS: ICD-10-CM

## 2020-11-19 PROCEDURE — 99442 PR PHYSICIAN TELEPHONE EVALUATION 11-20 MIN: CPT | Performed by: PSYCHIATRY & NEUROLOGY

## 2020-11-19 NOTE — NURSING NOTE
"Initial There were no vitals taken for this visit. Estimated body mass index is 22.32 kg/m  as calculated from the following:    Height as of 9/23/20: 1.613 m (5' 3.5\").    Weight as of 10/30/20: 58.1 kg (128 lb).     Ghada Jean-Baptiste [facility]      AVS mailed to patient.  Included a completed KEAGAN form for Subhash, along with instructions for obtaining records for us.    "
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
## 2020-11-19 NOTE — PATIENT INSTRUCTIONS
Patient Instructions:  -- Continue the Felbatol: 600mg 3 times daily.  -- Continue the Keppra: 1000mg 3 times daily.  -- Continue the Lamictal: 200mg two times daily.  -- Lab orders are in. Please do these at your convenience, before our next appointment.   -- Contact Subhash to request records be sent to [facility] in [place].  -- Make an appointment to see an epileptologist, as we discussed: [phone].  -- Neurology clinic appointment on 12/16 with me.

## 2020-11-19 NOTE — PROGRESS NOTES
"Suzi Mckoy is a 50 year old female who is being evaluated via a billable telephone visit.      The patient has been notified of following:     \"This telephone visit will be conducted via a call between you and your physician/provider. We have found that certain health care needs can be provided without the need for a physical exam.  This service lets us provide the care you need with a short phone conversation.  If a prescription is necessary we can send it directly to your pharmacy.  If lab work is needed we can place an order for that and you can then stop by our lab to have the test done at a later time.    Telephone visits are billed at different rates depending on your insurance coverage. During this emergency period, for some insurers they may be billed the same as an in-person visit.  Please reach out to your insurance provider with any questions.    If during the course of the call the physician/provider feels a telephone visit is not appropriate, you will not be charged for this service.\"    Patient has given verbal consent for Telephone visit?  Yes    What phone number would you like to be contacted at? [phone]    How would you like to obtain your AVS? Mail a copy    Phone call duration: 14 minutes    *Please see accompanying physician note for additional visit details.    Anni Henao MD      "

## 2021-02-10 ENCOUNTER — TELEPHONE (OUTPATIENT)
Dept: NEUROLOGY | Facility: CLINIC | Age: 51
End: 2021-02-10

## 2021-02-10 NOTE — TELEPHONE ENCOUNTER
Reason for Call:  Other call back    Detailed comments:  Cecy from the [facility] calling about this pt that was a mutal pt. States they are no longer treating this pt.  They keep getting chart notes from us.  Would like it to be noted so they don't get them any more.    Phone Number Patient can be reached at: [phone]    Best Time:     Can we leave a detailed message on this number? Not Applicable    Call taken on 2/10/2021 at 10:53 AM by Kathy Chun

## 2021-02-12 NOTE — TELEPHONE ENCOUNTER
Provider in question is Dr. Jero Hernandez from [facility].  Looks like the last note was routed to him and the referring provider.  Likely because Dr. Hernandez is listed as her primary.  I've made a note to catch it next time patient is seen in clinic.

## 2021-02-18 ENCOUNTER — TELEPHONE (OUTPATIENT)
Dept: NEUROLOGY | Facility: CLINIC | Age: 51
End: 2021-02-18

## 2021-02-18 DIAGNOSIS — G40.909 SEIZURE DISORDER (H): Primary | ICD-10-CM

## 2021-02-18 RX ORDER — LEVETIRACETAM 500 MG/1
1000 TABLET ORAL 3 TIMES DAILY
Qty: 540 TABLET | Refills: 1 | Status: SHIPPED | OUTPATIENT
Start: 2021-02-18 | End: 2022-10-04

## 2021-02-18 RX ORDER — LAMOTRIGINE 100 MG/1
200 TABLET ORAL 2 TIMES DAILY
Qty: 360 TABLET | Refills: 1 | Status: SHIPPED | OUTPATIENT
Start: 2021-02-18 | End: 2021-04-29

## 2021-02-18 RX ORDER — FELBAMATE 600 MG/1
600 TABLET ORAL 3 TIMES DAILY
Qty: 270 TABLET | Refills: 1 | Status: SHIPPED | OUTPATIENT
Start: 2021-02-18 | End: 2022-10-04

## 2021-02-18 NOTE — CONFIDENTIAL NOTE
Called pharmacy and advised that if pt wanted brand name meds then that was ok to fill them with brand name.  Nemo FORD RN BSN PHN  Specialty Clinics

## 2021-02-18 NOTE — TELEPHONE ENCOUNTER
Reason for Call:  Other call back and prescription    Detailed comments: Patient says she is a new patient of Dr. Henao and needs 3 meds filled.  The meds she is asking for is felbamate, lamotrigine, and levetiracetam.  Pharmacy selected.    Phone Number Patient can be reached at: Other phone number:  [phone]    Best Time:     Can we leave a detailed message on this number?     Call taken on 2/18/2021 at 12:06 PM by Brooklynn hWitney

## 2021-02-18 NOTE — TELEPHONE ENCOUNTER
Please refill if appropriate.   Nemo FORD RN BSN N  Specialty Clinics      LOV 11/19/20  Patient Instructions:  -- Continue the Felbatol: 600mg 3 times daily.  -- Continue the Keppra: 1000mg 3 times daily.  -- Continue the Lamictal: 200mg two times daily.  -- Lab orders are in. Please do these at your convenience, before our next appointment.   -- Contact Subhash to request records be sent to [facility] in [place].  -- Make an appointment to see an epileptologist, as we discussed: [phone].  -- Neurology clinic appointment on 12/16 with me.

## 2021-02-18 NOTE — TELEPHONE ENCOUNTER
Corrina Dawson Roosevelt:  States pt does always request brand name of all 3 of these medications    Previous PA will  2021 for all 3 medications    If questions, contact pharmacy @:  [phone]    Denise Behrendt  Specialty CSS

## 2021-03-16 ENCOUNTER — HOSPITAL ENCOUNTER (EMERGENCY)
Facility: CLINIC | Age: 51
Discharge: HOME | End: 2021-03-17
Attending: EMERGENCY MEDICINE | Admitting: EMERGENCY MEDICINE
Payer: MEDICARE

## 2021-03-16 DIAGNOSIS — G40.909 SEIZURE DISORDER (H): ICD-10-CM

## 2021-03-16 DIAGNOSIS — S00.83XA CONTUSION OF FOREHEAD, INITIAL ENCOUNTER: ICD-10-CM

## 2021-03-16 PROCEDURE — 99283 EMERGENCY DEPT VISIT LOW MDM: CPT

## 2021-03-16 ASSESSMENT — MIFFLIN-ST. JEOR: SCORE: 1206.49

## 2021-03-17 VITALS
TEMPERATURE: 97 F | DIASTOLIC BLOOD PRESSURE: 59 MMHG | RESPIRATION RATE: 14 BRPM | SYSTOLIC BLOOD PRESSURE: 105 MMHG | OXYGEN SATURATION: 99 % | WEIGHT: 135 LBS | BODY MASS INDEX: 23.92 KG/M2 | HEIGHT: 63 IN | HEART RATE: 103 BPM

## 2021-03-17 LAB
ANION GAP SERPL CALCULATED.3IONS-SCNC: 4 MMOL/L (ref 3–14)
BASOPHILS # BLD AUTO: 0 10E9/L (ref 0–0.2)
BASOPHILS NFR BLD AUTO: 0.4 %
BUN SERPL-MCNC: 16 MG/DL (ref 7–30)
CALCIUM SERPL-MCNC: 8.4 MG/DL (ref 8.5–10.1)
CHLORIDE SERPL-SCNC: 105 MMOL/L (ref 94–109)
CO2 SERPL-SCNC: 28 MMOL/L (ref 20–32)
CREAT SERPL-MCNC: 0.96 MG/DL (ref 0.52–1.04)
DIFFERENTIAL METHOD BLD: NORMAL
EOSINOPHIL # BLD AUTO: 0.1 10E9/L (ref 0–0.7)
EOSINOPHIL NFR BLD AUTO: 1.4 %
ERYTHROCYTE [DISTWIDTH] IN BLOOD BY AUTOMATED COUNT: 12.7 % (ref 10–15)
GFR SERPL CREATININE-BSD FRML MDRD: 70 ML/MIN/{1.73_M2}
GLUCOSE SERPL-MCNC: 88 MG/DL (ref 70–99)
HCT VFR BLD AUTO: 38.8 % (ref 35–47)
HGB BLD-MCNC: 12.6 G/DL (ref 11.7–15.7)
IMM GRANULOCYTES # BLD: 0 10E9/L (ref 0–0.4)
IMM GRANULOCYTES NFR BLD: 0.1 %
LYMPHOCYTES # BLD AUTO: 1.2 10E9/L (ref 0.8–5.3)
LYMPHOCYTES NFR BLD AUTO: 16.9 %
MCH RBC QN AUTO: 30.4 PG (ref 26.5–33)
MCHC RBC AUTO-ENTMCNC: 32.5 G/DL (ref 31.5–36.5)
MCV RBC AUTO: 94 FL (ref 78–100)
MONOCYTES # BLD AUTO: 0.5 10E9/L (ref 0–1.3)
MONOCYTES NFR BLD AUTO: 6.9 %
NEUTROPHILS # BLD AUTO: 5.2 10E9/L (ref 1.6–8.3)
NEUTROPHILS NFR BLD AUTO: 74.3 %
NRBC # BLD AUTO: 0 10*3/UL
NRBC BLD AUTO-RTO: 0 /100
PLATELET # BLD AUTO: 302 10E9/L (ref 150–450)
POTASSIUM SERPL-SCNC: 4.4 MMOL/L (ref 3.4–5.3)
RBC # BLD AUTO: 4.14 10E12/L (ref 3.8–5.2)
SODIUM SERPL-SCNC: 137 MMOL/L (ref 133–144)
WBC # BLD AUTO: 7 10E9/L (ref 4–11)

## 2021-03-17 PROCEDURE — 80167 DRUG ASSAY FELBAMATE: CPT | Performed by: EMERGENCY MEDICINE

## 2021-03-17 PROCEDURE — 85025 COMPLETE CBC W/AUTO DIFF WBC: CPT | Performed by: EMERGENCY MEDICINE

## 2021-03-17 PROCEDURE — 80175 DRUG SCREEN QUAN LAMOTRIGINE: CPT | Performed by: EMERGENCY MEDICINE

## 2021-03-17 PROCEDURE — 80177 DRUG SCRN QUAN LEVETIRACETAM: CPT | Performed by: EMERGENCY MEDICINE

## 2021-03-17 PROCEDURE — 80048 BASIC METABOLIC PNL TOTAL CA: CPT | Performed by: EMERGENCY MEDICINE

## 2021-03-17 ASSESSMENT — ENCOUNTER SYMPTOMS
NECK PAIN: 0
ABDOMINAL PAIN: 0
NAUSEA: 1
SEIZURES: 1
SHORTNESS OF BREATH: 0
HEADACHES: 1
WOUND: 1

## 2021-03-17 NOTE — ED PROVIDER NOTES
History   Chief Complaint  Seizures    HPI  Suzi Mckoy is a 51 year old female with a history of epilepsy who presents in a c-collar, via EMS, for evaluation following a seizure. The patient reports that she was at a [employer] when she fell forward to the ground and started seizing. She unfortunately hit her head and obtained a wound to her forehead. She experienced approximately one minute of witnessed seizing, as well as urinary incontinence. Here she has a mild headache and feels nauseated. Denies any neck pain, dental issues, abdominal pain, chest pain, or shortness of breath. Denies any anticoagulation. She reports that her seizures are triggered by stress and lack of sleep, both of which have been an issue for her lately. She denies missing any recent doses of her medications. Of note, she is currently on her period.     Review of Systems   HENT: Negative for dental problem.    Respiratory: Negative for shortness of breath.    Cardiovascular: Negative for chest pain.   Gastrointestinal: Positive for nausea. Negative for abdominal pain.   Musculoskeletal: Negative for neck pain.   Skin: Positive for wound.   Neurological: Positive for seizures and headaches.   All other systems reviewed and are negative.    Allergies  Dilantin     Medications  Zyrtec  Felbatol  Flonase  Lamictal  Keppra  Mirena  Omeprazole    Past Medical History  Anxiety  Bipolar 1 disorder  Cerebral embolism  Depression  Epilepsy  Hyperlipidemia  SI  Scoliosis    Past Surgical History    Craniotomy    Family History  Heart disease  Brain cancer    Social History  Presents to the ED alone, via EMS.    Physical Exam     Patient Vitals for the past 24 hrs:   BP Temp Temp src Pulse Resp SpO2 Height Weight   21 0015 103/58 -- -- 88 8 99 % -- --   21 0000 109/58 -- -- 94 9 99 % -- --   21 2345 96/52 -- -- 91 (!) 7 100 % -- --   21 2330 96/63 -- -- -- -- 99 % -- --   21 2326 -- -- -- 92 12 99 % -- --  "  03/16/21 2315 -- -- -- 96 28 100 % -- --   03/16/21 2309 -- -- -- 93 8 98 % -- --   03/16/21 2245 109/62 -- -- 102 -- 99 % -- --   03/16/21 2244 120/72 97  F (36.1  C) Temporal 108 18 99 % 1.6 m (5' 3\") 61.2 kg (135 lb)     Physical Exam  General: Alert and cooperative with exam. Patient in mild distress. Baseline mentation.  Head:  Moderately sized forehead contusion with small overlying superficial laceration.   Eyes:  No scleral icterus, PERRL, EOMI   ENT:  The external nose and ears are normal. The oropharynx is normal and without erythema; mucus membranes are moist. Uvula midline, no evidence of oral trauma. Poor dentition.   Neck:  Normal range of motion without rigidity. C-collar in place.   CV:  Regular rate and rhythm    No pathologic murmur   Resp:  Breath sounds are clear bilaterally    Non-labored, no retractions or accessory muscle use  GI:  Abdomen is soft, no distension, no tenderness. No peritoneal signs  MS:  No lower extremity edema   Skin:  Warm and dry, No rash or lesions noted.  Neuro: Oriented x 3. No gross motor deficits.    Strength and sensation grossly intact in all 4 extremities.      Cranial nerves 2-12 intact.    GCS: 15    Emergency Department Course   Laboratory:   CBC: WBC: 7.0, HGB: 12.6, PLT: 302  BMP: Calcium 8.4 (L) o/w WNL (Creatinine: 0.96)  Keppra level: pending  Lamotrigine level: pending  Felbamate level: pending    Emergency Department Course:  Reviewed:  I reviewed nursing notes, vitals and past medical history    Assessments:  9725 I physically examined the patient as documented above.    0053 I rechecked the patient.     Disposition:  The patient was discharged to home.     Impression & Plan   Medical Decision Making:  Suzi Mckoy is a 51 year old female with a PMH significant for epilepsy who presents for evaluation of a spell consistent with a seizure and head injury. See the HPI above for further details. A broad differential diagnosis was considered for the " seizure today including medicine non-compliance, low or high levels of anti-epileptic, intracranial bleed, stroke, tumor, hypoglycemia, cerebral edema, metabolic derangement, cardiac arrhythmia, etc.  The patient has no signs of concerning etiologies of seizure or seizure-mimics at this time.  Anti-epileptic levels were sent and pending; will have patient follow-up closely with her neurologist.  The head to toe trauma exam is negative; there are no signs of serious sequelae of trauma at this time such as spinal fractures, dislocations, etc. C-spine cleared clinically.  Per [place] head CT rules and clinical exam I do not feel a head CT is indicated at this time as patient is at her neurologic baseline and without significant complaint.  Basic labs without significant findings.  At this time I feel patient is safe and appropriate for discharge and continued outpatient management.  Discussed supportive care and return precautions.  Discharged with significant other..     Diagnosis:    ICD-10-CM    1. Contusion of forehead, initial encounter  S00.83XA    2. Seizure disorder (H)  G40.909      Scribe Disclosure:  I, Raciel Esteban, am serving as a scribe at 11:42 PM on 3/16/2021 to document services personally performed by Marvin Natarajan DO based on my observations and the provider's statements to me.      Marvin Natarajan DO  03/17/21 0759

## 2021-03-17 NOTE — ED NOTES
Assumed care of patient. Patient resting on stretcher. Wound to left side of forehead bleeding, wound dressed. C collar remains in place. Patient neurologically intact at this time.

## 2021-03-17 NOTE — ED NOTES
Bed: [facility]  Expected date: 3/16/21  Expected time: 10:34 PM  Means of arrival: Ambulance  Comments:  T[address]: 51F post seizure/Fall

## 2021-03-17 NOTE — ED NOTES
Pt frequently asking to take her IV out. RN explained multiple times why it was necessary to keep in. Pt again asking to take IV out. IV removed. As soon as EDT removed IV, Pt complained of pain and is requesting x-rays. ERP aware.
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
## 2021-03-17 NOTE — ED NOTES
Wound on forehead cleaned, bandage applied. Pt provided with female personal hygiene items per request. Pt ambulated to restroom by self, escorted by RN. No distress noted.

## 2021-03-17 NOTE — ED NOTES
Patient requesting we text her friend Donnell @ [phone] to bring her home medications to ED. Patient states that he does not have any minutes on his cell phone to speak on the phone. Donnell text, patient updated.

## 2021-03-17 NOTE — ED NOTES
Pt on call light requesting assistance with the TV and her home medications. Pt states she is unable to take our medications and needs hers. This RN talked with Previous RN, no response from the friend with her medications. Pt was updated. TV on, call light in reach.

## 2021-03-18 LAB — FELBAMATE SERPL-MCNC: 45 UG/ML (ref 30–60)

## 2021-03-19 LAB
LAMOTRIGINE SERPL-MCNC: 9.5 UG/ML (ref 2.5–15)
LEVETIRACETAM SERPL-MCNC: 29 UG/ML (ref 12–46)

## 2021-03-22 ENCOUNTER — TELEPHONE (OUTPATIENT)
Dept: NEUROLOGY | Facility: CLINIC | Age: 51
End: 2021-03-22

## 2021-03-22 NOTE — TELEPHONE ENCOUNTER
-sanjuana pharmacist, [facility] pharmacy called looking to clarify rx   Ph. [phone]       2/18/21:     lamoTRIgine (LAMICTAL) 100 MG tablet        Sig - Route: Take 2 tablets (200 mg) by mouth 2 times daily - Oral           Patient/patient rep is telling pharmacy that patient should be taking lamictal  2 in am 1 lunchtime  and 2 in evening. --so 5 tablets daily.     If this is request [facility] pharmacy is requesting a new script to reflect 3 dosing.        decreased flexibility/decreased strength/decreased ROM/impaired balance decreased strength/impaired balance/decreased flexibility impaired balance/decreased flexibility/decreased ROM/decreased strength/pain

## 2021-03-22 NOTE — TELEPHONE ENCOUNTER
Per RN research.  [facility] ER [facility] fall- this was rx'd dose and our system shows instructions to continue that dose.  No direction was given to change it.  No Show to last Dec visit    Placed call to clarify when or who instructed for increased dose    (Do not confuse with Keppra or Felbatol-  Those are both three times a day dosing)       Malka TORRES   [facility] RN

## 2021-04-02 NOTE — TELEPHONE ENCOUNTER
I spoke to the [facility] pharmacist to see if this order has been clarified as we have not heard back from the patient and last dose of Lamictal sent by Neurologist here was twice daily dosing on 2-18-21.     Pharmacist did get a clarification for 5 times a day dosing from a Jero Hernandez MD at [facility].     Prema Beth RN

## 2021-04-06 NOTE — PROGRESS NOTES
CM    I: SW received an update from physician that patient reports having a SW within her [facility] building who may be able to assist with patient discharge/entrance into her apartment. SW left VM for both [facility] () and [facility] () asking for call back if they have contact information.    P: Continue to assist as needed.    INGRID Ferrera   Patient/Father

## 2021-04-07 ENCOUNTER — APPOINTMENT (OUTPATIENT)
Dept: GENERAL RADIOLOGY | Facility: CLINIC | Age: 51
End: 2021-04-07
Attending: PHYSICIAN ASSISTANT
Payer: MEDICARE

## 2021-04-07 ENCOUNTER — HOSPITAL ENCOUNTER (EMERGENCY)
Facility: CLINIC | Age: 51
Discharge: HOME | End: 2021-04-07
Attending: PHYSICIAN ASSISTANT | Admitting: PHYSICIAN ASSISTANT
Payer: MEDICARE

## 2021-04-07 VITALS
TEMPERATURE: 97.2 F | DIASTOLIC BLOOD PRESSURE: 72 MMHG | HEIGHT: 63 IN | WEIGHT: 135 LBS | OXYGEN SATURATION: 100 % | BODY MASS INDEX: 23.92 KG/M2 | HEART RATE: 90 BPM | RESPIRATION RATE: 18 BRPM | SYSTOLIC BLOOD PRESSURE: 114 MMHG

## 2021-04-07 DIAGNOSIS — S52.509A DISTAL RADIUS FRACTURE: ICD-10-CM

## 2021-04-07 PROCEDURE — 73562 X-RAY EXAM OF KNEE 3: CPT | Mod: 50

## 2021-04-07 PROCEDURE — 73030 X-RAY EXAM OF SHOULDER: CPT | Mod: 50

## 2021-04-07 PROCEDURE — G0463 HOSPITAL OUTPT CLINIC VISIT: HCPCS | Mod: 25 | Performed by: PHYSICIAN ASSISTANT

## 2021-04-07 PROCEDURE — 25600 CLTX DST RDL FX/EPHYS SEP WO: CPT | Mod: LT | Performed by: PHYSICIAN ASSISTANT

## 2021-04-07 PROCEDURE — 99214 OFFICE O/P EST MOD 30 MIN: CPT | Mod: 57 | Performed by: PHYSICIAN ASSISTANT

## 2021-04-07 PROCEDURE — 73110 X-RAY EXAM OF WRIST: CPT | Mod: LT

## 2021-04-07 PROCEDURE — 25600 CLTX DST RDL FX/EPHYS SEP WO: CPT | Mod: 54 | Performed by: PHYSICIAN ASSISTANT

## 2021-04-07 ASSESSMENT — MIFFLIN-ST. JEOR: SCORE: 1206.49

## 2021-04-07 NOTE — ED TRIAGE NOTES
Patient presents today with shoulder pain, neck pain, and knee pain. Symptoms started after a fall a week ago . Arrived to urgent care ambulatory .
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
## 2021-04-12 ASSESSMENT — ENCOUNTER SYMPTOMS
NECK PAIN: 0
MYALGIAS: 1
BACK PAIN: 0
PALPITATIONS: 0
CHILLS: 0
COUGH: 0
SHORTNESS OF BREATH: 0
COLOR CHANGE: 0
WHEEZING: 0
FEVER: 0
SEIZURES: 1
HEADACHES: 0
DIZZINESS: 0
ARTHRALGIAS: 1
WOUND: 0
LIGHT-HEADEDNESS: 0

## 2021-04-21 ENCOUNTER — ANCILLARY PROCEDURE (OUTPATIENT)
Dept: GENERAL RADIOLOGY | Facility: CLINIC | Age: 51
End: 2021-04-21
Attending: PEDIATRICS
Payer: MEDICARE

## 2021-04-21 ENCOUNTER — HOSPITAL ENCOUNTER (OUTPATIENT)
Dept: MRI IMAGING | Facility: CLINIC | Age: 51
Discharge: HOME | End: 2021-04-21
Attending: PEDIATRICS | Admitting: PEDIATRICS
Payer: MEDICARE

## 2021-04-21 ENCOUNTER — OFFICE VISIT (OUTPATIENT)
Dept: ORTHOPEDICS | Facility: CLINIC | Age: 51
End: 2021-04-21
Attending: PHYSICIAN ASSISTANT
Payer: MEDICARE

## 2021-04-21 VITALS
WEIGHT: 135 LBS | DIASTOLIC BLOOD PRESSURE: 75 MMHG | HEIGHT: 63 IN | BODY MASS INDEX: 23.92 KG/M2 | SYSTOLIC BLOOD PRESSURE: 110 MMHG

## 2021-04-21 DIAGNOSIS — S69.92XA INJURY OF LEFT WRIST, INITIAL ENCOUNTER: Primary | ICD-10-CM

## 2021-04-21 DIAGNOSIS — M25.512 ACUTE PAIN OF LEFT SHOULDER: ICD-10-CM

## 2021-04-21 DIAGNOSIS — M25.562 CHRONIC PAIN OF LEFT KNEE: ICD-10-CM

## 2021-04-21 DIAGNOSIS — M25.531 RIGHT WRIST PAIN: ICD-10-CM

## 2021-04-21 DIAGNOSIS — G89.29 CHRONIC PAIN OF LEFT KNEE: ICD-10-CM

## 2021-04-21 PROBLEM — M25.561 RIGHT KNEE PAIN: Status: ACTIVE | Noted: 2021-04-21

## 2021-04-21 PROBLEM — M25.539 WRIST PAIN: Status: ACTIVE | Noted: 2021-04-21

## 2021-04-21 PROCEDURE — 73221 MRI JOINT UPR EXTREM W/O DYE: CPT | Mod: LT,MG

## 2021-04-21 PROCEDURE — 73110 X-RAY EXAM OF WRIST: CPT | Mod: LT | Performed by: RADIOLOGY

## 2021-04-21 PROCEDURE — 73221 MRI JOINT UPR EXTREM W/O DYE: CPT | Mod: 26 | Performed by: RADIOLOGY

## 2021-04-21 PROCEDURE — G1004 CDSM NDSC: HCPCS | Performed by: RADIOLOGY

## 2021-04-21 PROCEDURE — 99204 OFFICE O/P NEW MOD 45 MIN: CPT | Performed by: PEDIATRICS

## 2021-04-21 PROCEDURE — 73000 X-RAY EXAM OF COLLAR BONE: CPT | Mod: LT | Performed by: RADIOLOGY

## 2021-04-21 ASSESSMENT — MIFFLIN-ST. JEOR: SCORE: 1206.49
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
## 2021-04-21 NOTE — PATIENT INSTRUCTIONS
Plan:  - Today's Plan of Care:  MRI of the Left Shoulder and Left Wrist - Call [phone] to schedule MRI  Sling for Shoulder - Pendulum exercises  Wrist Brace Thumb Spica    -We also discussed other future treatment options:  Referral to Physical Therapy for knee and shoulder  Casting for wrist    Follow Up: In clinic with Dr. Fernández after MRI (wait at least 1-2 days)  - Establish with primary care, consider PM&R referral    If you have any further questions for your physician or physician s care team you can call [phone] and use option 3 to leave a voice message. Calls received during business hours will be returned same day.

## 2021-04-21 NOTE — RESULT ENCOUNTER NOTE
These results were discussed during office visit.    Annia Fernández MD, CAQ  Primary Care Sports Medicine  [facility]
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
## 2021-04-21 NOTE — RESULT ENCOUNTER NOTE
These results were discussed during office visit.    Annia Fernández MD, CAQ  Primary Care Sports Medicine  [facility]

## 2021-04-26 ENCOUNTER — TELEPHONE (OUTPATIENT)
Dept: ORTHOPEDICS | Facility: CLINIC | Age: 51
End: 2021-04-26

## 2021-04-26 NOTE — TELEPHONE ENCOUNTER
LVM for patient to discuss MRI results and recommendations. Requested patient return call when she is able.    Wilbert Esteban, EAMON, LAT

## 2021-04-26 NOTE — TELEPHONE ENCOUNTER
Please call patient with MRI results for shoulder  - Has patient had wrist MRI?    Mr Shoulder Left W/o Contrast  Result Date: 4/21/2021  EXAM: MR left shoulder without contrast 4/21/2021 4:35 PM TECHNIQUE: Multiplanar, multisequence imaging of the left shoulder were obtained without administration of intravenous or intra-articular gadolinium contrast using routine protocol. History: eval RC tear, concern for fracture; Acute pain of left shoulder Comparison: Radiographs 4/21/2021 Findings: Motion partially compromising assessment. ROTATOR CUFF and ASSOCIATED STRUCTURES Rotator cuff: On a background of tendinosis, moderate grade intrasubstance tear of the supraspinatus anterior fibers at the footprint with articular sided low grade tear extension posteriorly to the posterior fibers of the supraspinatus. Infraspinatus tendinosis. Teres minor tendon is intact. Subscapularis tendinosis. Bursa: No subacromial or subdeltoid bursal fluid. Musculature: Muscle bulk of rotator cuff is preserved.  Deltoid muscle bulk is also preserved.  No muscle edema. Acromioclavicular joint There are mild degenerative changes of the acromioclavicular joint. Bony proliferative change of the distal clavicle particularly superiorly, may be related to prior trauma. Acromion is type 2 in sagittal morphology.  Coracoacromial ligament is not thickened. OSSEOUS STRUCTURES No fracture, marrow contusion or marrow infiltration. LONG BICIPITAL TENDON Medial subluxation of long head of biceps tendon within the intertubercular groove. No complete or partial biceps tendon tear is present. GLENOHUMERAL JOINT Joint fluid: Physiologic amount of joint fluid is  present. Cartilage and subarticular bone:  Focal subchondral cystlike change in the posterior inferior glenoid likely reflecting presence of overlying chondral loss though motion compromising disc assessment. Glenoid marginal osteophytes. Labrum: Limited assessment on this study with relative lack of  joint distention shows possible posterior superior labral tear with subtle subchondral edema-like marrow signal intensity. ANCILLARY FINDINGS:   Impression: Motion partially compromising assessment. 1. On a background of tendinosis, moderate grade intrasubstance tear of the supraspinatus anterior fibers at the footprint with articular sided low grade tear extension posteriorly to the posterior fibers of the supraspinatus.  No muscle bulk loss. 2. Medial subluxation of long head of biceps tendon within the intertubercular groove. 3. Possible posterior superior labral tear. 4. Glenohumeral joint osteoarthritis with focal subchondral cystlike change in the posterior inferior glenoid, likely reflecting presence of overlying chondral loss. MISTI LYNN    In Summary:  Partial RC tear with some arthritis    I Recommend:  - Options for further treatment including orthopedic surgery referral or conservative treatment like injections and physical therapy  - My recommendations may differ based on patient's current exam and symptoms  - Can place surgical referral if desired or schedule appointment with me to review images, repeat exam and discuss my recommendations    Would recommend patient obtain MRI of wrist to determine if fractured.    Annia Fernández MD

## 2021-04-27 NOTE — TELEPHONE ENCOUNTER
Discussed MRI results on the phone with patient. She is scheduled for a follow up appointment Dr Fernández on 5/5/21 to discuss results and treatment options in greater detail. Patient had no further questions at this time.  Wilbert Esteban ATC, LAT

## 2021-04-27 NOTE — TELEPHONE ENCOUNTER
2nd attempt to reach patient. LVM requesting a return call to discuss MRI results and recommendations.    Wilbert Esteban, EAMON, LAT

## 2021-04-27 NOTE — TELEPHONE ENCOUNTER
Patient LVM calling back for MRI results.   She does not recall the provider she has seen  [phone]    Vivienne Dos Santos MS ATC

## 2021-04-29 ENCOUNTER — TELEPHONE (OUTPATIENT)
Dept: NEUROLOGY | Facility: CLINIC | Age: 51
End: 2021-04-29

## 2021-04-29 DIAGNOSIS — G40.909 SEIZURE DISORDER (H): ICD-10-CM

## 2021-04-29 RX ORDER — LAMOTRIGINE 100 MG/1
200 TABLET ORAL 2 TIMES DAILY
Qty: 360 TABLET | Refills: 0 | Status: SHIPPED | OUTPATIENT
Start: 2021-04-29 | End: 2022-10-04

## 2021-04-29 NOTE — CONFIDENTIAL NOTE
Please see request and advise if ok to increase dosing as Subhash is not the prescribing provider, but did confirm with pharmacy that Subhash wanted pt to take 2 tabs in the am, 1 tab in the evening and 2 tabs at HS. Advise if ok.  Nemo FORD RN BSN PHN  Specialty Clinics

## 2021-04-29 NOTE — TELEPHONE ENCOUNTER
Requested Prescriptions   Pending Prescriptions Disp Refills     lamoTRIgine (LAMICTAL) 100 MG tablet 360 tablet 1     Sig: Take 2 tablets (200 mg) by mouth 2 times daily       There is no refill protocol information for this order        Last office visit: Visit date not found with prescribing provider:  Dr. Henao   Future Office Visit:   Next 5 appointments (look out 90 days)    May 05, 2021  2:00 PM  Return Visit with Annia Fernández MD  [facility] [place] ([facility] [place] ) [address] [address] [phone]               Denise Behrendt  Specialty CSS

## 2021-04-29 NOTE — LETTER
[facility] [place]  [address] [facility] [address]  Phone: [phone]       April 29, 2021         Suzi Mckoy  [address]  [place] [address]            Dear Suzi:    We are concerned about your health care.  We recently provided you with medication refills.  Many medications require routine follow-up with your doctor in Neurology.    Your prescription(s) have been refilled for 90 days so you may have time for the above noted follow-up. Please call to schedule soon so we can assure you have an appointment before your next refills are needed. Appts can be in person, by telephone, or video visits. Appts are also being done at [facility] [phone].     Thank you,      Anni Henao MD/ tr

## 2021-04-29 NOTE — TELEPHONE ENCOUNTER
Medication is being filled for 1 time refill only due to:  Patient needs to be seen because she is due for 6 month visit. Letter sen to patient to make appt. .   Nemo FORD RN BSN PHN  Specialty Clinics

## 2021-04-29 NOTE — TELEPHONE ENCOUNTER
Katie from [facility] PH. [phone]  Pharmacy questioning directions on lamoTRIgine. States last two times directions had to be changed to 2 tabs in the morning, 1 tab in the evening, and 2 tabs at bedtime.

## 2021-04-30 NOTE — TELEPHONE ENCOUNTER
MRI wrist was ordered an recommended - is this scheduled prior to follow up appointment?  Annia Fernández MD

## 2021-04-30 NOTE — TELEPHONE ENCOUNTER
Called pt number x 2 and message: call cannot be completed at this time, try again later.    Carol Ann Clayton  [facility] RN

## 2021-04-30 NOTE — TELEPHONE ENCOUNTER
Attempted to call patient x2, unable to reach patient with busy signal.    Vivienne Dos Santos MS ATC

## 2021-04-30 NOTE — TELEPHONE ENCOUNTER
Received message that call cannot be completed at this time.  Try call later.    Carol Ann Clayton  [facility] RN

## 2021-04-30 NOTE — TELEPHONE ENCOUNTER
If the [facility], I suggest they talk to them about refilling the medication?    Or, is there an error in the dose that was relayed to me during our visits? My note indicates the lamotrigine dose is 200mg BID.    RIAJ

## 2021-05-03 NOTE — TELEPHONE ENCOUNTER
Katie from [facility] [place] (Ph. [phone]) called looking for dosing carification for lamoTRIgine (LAMICTAL) 100 MG tablet    Dom Sorensen  Specialty Clinic CSS

## 2021-05-03 NOTE — TELEPHONE ENCOUNTER
Spoke with patient and she was unsure of her dosing. She will have someone call us back to review her medications.  Nemo FORD RN BSN PHN  Specialty Clinics

## 2021-05-05 ENCOUNTER — TELEPHONE (OUTPATIENT)
Dept: FAMILY MEDICINE | Facility: CLINIC | Age: 51
End: 2021-05-05

## 2021-05-05 NOTE — TELEPHONE ENCOUNTER
[facility] pharmacy in [place] calling to verify RX. Info given.  Mary Lou Temple RN

## 2021-05-14 ENCOUNTER — OFFICE VISIT (OUTPATIENT)
Dept: ORTHOPEDICS | Facility: CLINIC | Age: 51
End: 2021-05-14
Payer: MEDICARE

## 2021-05-14 VITALS
DIASTOLIC BLOOD PRESSURE: 59 MMHG | BODY MASS INDEX: 23.92 KG/M2 | WEIGHT: 135 LBS | SYSTOLIC BLOOD PRESSURE: 93 MMHG | HEIGHT: 63 IN

## 2021-05-14 DIAGNOSIS — S69.92XA INJURY OF LEFT WRIST, INITIAL ENCOUNTER: ICD-10-CM

## 2021-05-14 DIAGNOSIS — M75.112 INCOMPLETE TEAR OF LEFT ROTATOR CUFF, UNSPECIFIED WHETHER TRAUMATIC: Primary | ICD-10-CM

## 2021-05-14 PROBLEM — M25.512 ACUTE PAIN OF LEFT SHOULDER: Status: ACTIVE | Noted: 2021-05-14

## 2021-05-14 PROCEDURE — 99213 OFFICE O/P EST LOW 20 MIN: CPT | Performed by: PEDIATRICS

## 2021-05-14 ASSESSMENT — MIFFLIN-ST. JEOR: SCORE: 1206.49

## 2021-05-14 NOTE — RESULT ENCOUNTER NOTE
These results were discussed during office visit.    Annia Fernández MD, CAQ  Primary Care Sports Medicine  [facility]

## 2021-05-14 NOTE — PROGRESS NOTES
"ASSESSMENT & PLAN    Suzi was seen today for pain and follow up.    Diagnoses and all orders for this visit:    Incomplete tear of left rotator cuff, unspecified whether traumatic  -     Orthopedic & Spine  Referral; Future    Injury of left wrist, initial encounter      This issue is acute and Unchanged.    Shoulder - We discussed the following treatment options: symptom treatment, activity modification/rest, imaging, rehab and referral. Following discussion, plan: given partial rotator cuff tear, will refer to orthopedic surgery.    Wrist - Discussed concern for fracture, recommend patient obtain MRI. Continue brace in the interim.    Plan:  - Today's Plan of Care:  Referral to an Orthopedic Surgeon  Obtain MRI left Wrist - Call [phone] to schedule MRI  Continue wrist brace in the interim    -We also discussed other future treatment options:  Casting left wrist  Injection or physical therapy left shoulder    Follow Up: will call with MRI results    Concerning signs and symptoms were reviewed.  The patient expressed understanding of this management plan and all questions were answered at this time.    Annia Fernández MD [facility]  Sports Medicine Physician  [facility]      SUBJECTIVE- Interim History May 14, 2021    Chief Complaint   Patient presents with     Left Shoulder - Pain, Follow Up       Suzi Mckoy is a 51 year old female who is seen in f/u up for    Incomplete tear of left rotator cuff, unspecified whether traumatic  Injury of left wrist, initial encounter. Since last visit on 4/21 patient has been in a lot of pain. She has been unable to sleep at night. She feels most of her pain \"all the way thorough the shoulder and down into the humerus. Here to review MRI results.  Hasn't obtained left wrist MRI yet, still in brace.  - Now ~ 5 weeks from initial injury    Worsened by: sleeping on it  Better with: unsure, not really  Treatments tried: rest/activity avoidance and other " "medications: topical cream  Associated symptoms:  Uncomfortable, painful    The patient is seen by themselves.  The patient is Right handed    Orthopedic/Surgical history: NO  Social History/Occupation: unemployed    No family history pertinent to patient's problem today.    REVIEW OF SYSTEMS:  Review of Systems  Skin: no bruising, mild wrist swelling  Musculoskeletal: as above  Neurologic: no numbness, paresthesias  Remainder of review of systems is negative including constitutional, CV, pulmonary, GI, except as noted in HPI or medical history.    OBJECTIVE:  BP 93/59   Ht 1.6 m (5' 3\")   Wt 61.2 kg (135 lb)   BMI 23.91 kg/m       General: healthy, alert and in no distress  HEENT: no scleral icterus or conjunctival erythema  Skin: no suspicious lesions or rash. No jaundice.  CV: distal perfusion intact  Resp: normal respiratory effort without conversational dyspnea   Psych: normal mood and affect  Gait: slightly antalgic  Neuro: Normal light sensory exam of upper and lower extremity, weakness on left side     Bilateral Shoulder exam  Inspection and Posture:       rounded shoulders and upper back     Skin:        no visible deformities     Tender:        subacromial space left     Non Tender:       remainder of shoulder bilateral     ROM:        forward flexion 130  left       abduction 120 left       internal rotation glueal left       external rotation 35 left       asymmetric scapular motion     Painful motions:       end range flexion and elevation left     Strength:        abduction 3/5 left       flexion 3/5 left       internal rotation 3/5 left       external rotation 3/5 left     Impingement testing:       positive (+) Neer left       positive (+) Hicks left     Sensation:        normal sensation over shoulder and upper extremity      Bilateral Wrist and Hand exam     Inspection:       No swelling, bruising or deformity bilateral     Tender:       distal radius left and      anatomic snuffbox " left     Non Tender:       Remainder of the Wrist and Hand bilateral     ROM:       Near normal ROM left wrist compared to right     Strength:       Slightly decreased ROM left wrist     Neurovascular:       2+ radial pulses bilaterally with brisk capillary refill and      normal sensation to light touch in the radial, median and ulnar nerve distributions     RADIOLOGY:  Final results and radiologist's interpretation, available in the [facility] health record.  Images were reviewed with the patient in the office today.  My personal interpretation of the performed imaging:  MR left Shoulder - partial RC tear,       Results for orders placed or performed during the hospital encounter of 04/21/21   MR Shoulder Left w/o Contrast    Narrative    EXAM: MR left shoulder without contrast 4/21/2021 4:35 PM    TECHNIQUE: Multiplanar, multisequence imaging of the left shoulder  were obtained without administration of intravenous or intra-articular  gadolinium contrast using routine protocol.    History: eval RC tear, concern for fracture; Acute pain of left  shoulder    Comparison: Radiographs 4/21/2021    Findings:    Motion partially compromising assessment.    ROTATOR CUFF and ASSOCIATED STRUCTURES  Rotator cuff: On a background of tendinosis, moderate grade  intrasubstance tear of the supraspinatus anterior fibers at the  footprint with articular sided low grade tear extension posteriorly to  the posterior fibers of the supraspinatus. Infraspinatus tendinosis.  Teres minor tendon is intact. Subscapularis tendinosis.    Bursa: No subacromial or subdeltoid bursal fluid.    Musculature: Muscle bulk of rotator cuff is preserved.  Deltoid muscle  bulk is also preserved.  No muscle edema.    Acromioclavicular joint  There are mild degenerative changes of the acromioclavicular joint.  Bony proliferative change of the distal clavicle particularly  superiorly, may be related to prior trauma. Acromion is type 2 in  sagittal morphology.   Coracoacromial ligament is not thickened.    OSSEOUS STRUCTURES  No fracture, marrow contusion or marrow infiltration.    LONG BICIPITAL TENDON  Medial subluxation of long head of biceps tendon within the  intertubercular groove. No complete or partial biceps tendon tear is  present.    GLENOHUMERAL JOINT  Joint fluid: Physiologic amount of joint fluid is  present.    Cartilage and subarticular bone:  Focal subchondral cystlike change in  the posterior inferior glenoid likely reflecting presence of overlying  chondral loss though motion compromising disc assessment. Glenoid  marginal osteophytes.    Labrum: Limited assessment on this study with relative lack of joint  distention shows possible posterior superior labral tear with subtle  subchondral edema-like marrow signal intensity.    ANCILLARY FINDINGS:      Impression    Impression:  Motion partially compromising assessment.  1. On a background of tendinosis, moderate grade intrasubstance tear  of the supraspinatus anterior fibers at the footprint with articular  sided low grade tear extension posteriorly to the posterior fibers of  the supraspinatus.  No muscle bulk loss.  2. Medial subluxation of long head of biceps tendon within the  intertubercular groove.   3. Possible posterior superior labral tear.  4. Glenohumeral joint osteoarthritis with focal subchondral cystlike  change in the posterior inferior glenoid, likely reflecting presence  of overlying chondral loss.    MISTI LYNN       Review of the result(s) of each unique test - XR

## 2021-05-14 NOTE — PATIENT INSTRUCTIONS
Plan:  - Today's Plan of Care:  Referral to an Orthopedic Surgeon  Obtain MRI left Wrist - Call [phone] to schedule MRI  Continue wrist brace in the interim    -We also discussed other future treatment options:  Casting left wrist  Injection or physical therapy left shoulder    Follow Up: will call with MRI results    If you have any further questions for your physician or physician s care team you can call [phone] and use option 3 to leave a voice message. Calls received during business hours will be returned same day.

## 2021-05-14 NOTE — LETTER
"    5/14/2021         RE: Suzi Mckoy  [address]  [place] [place] [address]        Dear Colleague,    Thank you for referring your patient, Suzi Mckoy, to the [facility] [place]. Please see a copy of my visit note below.    ASSESSMENT & PLAN    Suzi was seen today for pain and follow up.    Diagnoses and all orders for this visit:    Incomplete tear of left rotator cuff, unspecified whether traumatic  -     Orthopedic & Spine  Referral; Future    Injury of left wrist, initial encounter      This issue is acute and Unchanged.    Shoulder - We discussed the following treatment options: symptom treatment, activity modification/rest, imaging, rehab and referral. Following discussion, plan: given partial rotator cuff tear, will refer to orthopedic surgery.    Wrist - Discussed concern for fracture, recommend patient obtain MRI. Continue brace in the interim.    Plan:  - Today's Plan of Care:  Referral to an Orthopedic Surgeon  Obtain MRI left Wrist - Call [phone] to schedule MRI  Continue wrist brace in the interim    -We also discussed other future treatment options:  Casting left wrist  Injection or physical therapy left shoulder    Follow Up: will call with MRI results    Concerning signs and symptoms were reviewed.  The patient expressed understanding of this management plan and all questions were answered at this time.    Annia Fernández MD [facility]  Sports Medicine Physician  [facility]      SUBJECTIVE- Interim History May 14, 2021    Chief Complaint   Patient presents with     Left Shoulder - Pain, Follow Up       Suzi Mckoy is a 51 year old female who is seen in f/u up for    Incomplete tear of left rotator cuff, unspecified whether traumatic  Injury of left wrist, initial encounter. Since last visit on 4/21 patient has been in a lot of pain. She has been unable to sleep at night. She feels most of her pain \"all the way thorough the " "shoulder and down into the humerus. Here to review MRI results.  Hasn't obtained left wrist MRI yet, still in brace.  - Now ~ 5 weeks from initial injury    Worsened by: sleeping on it  Better with: unsure, not really  Treatments tried: rest/activity avoidance and other medications: topical cream  Associated symptoms:  Uncomfortable, painful    The patient is seen by themselves.  The patient is Right handed    Orthopedic/Surgical history: NO  Social History/Occupation: unemployed    No family history pertinent to patient's problem today.    REVIEW OF SYSTEMS:  Review of Systems  Skin: no bruising, mild wrist swelling  Musculoskeletal: as above  Neurologic: no numbness, paresthesias  Remainder of review of systems is negative including constitutional, CV, pulmonary, GI, except as noted in HPI or medical history.    OBJECTIVE:  BP 93/59   Ht 1.6 m (5' 3\")   Wt 61.2 kg (135 lb)   BMI 23.91 kg/m       General: healthy, alert and in no distress  HEENT: no scleral icterus or conjunctival erythema  Skin: no suspicious lesions or rash. No jaundice.  CV: distal perfusion intact  Resp: normal respiratory effort without conversational dyspnea   Psych: normal mood and affect  Gait: slightly antalgic  Neuro: Normal light sensory exam of upper and lower extremity, weakness on left side     Bilateral Shoulder exam  Inspection and Posture:       rounded shoulders and upper back     Skin:        no visible deformities     Tender:        subacromial space left     Non Tender:       remainder of shoulder bilateral     ROM:        forward flexion 130  left       abduction 120 left       internal rotation glueal left       external rotation 35 left       asymmetric scapular motion     Painful motions:       end range flexion and elevation left     Strength:        abduction 3/5 left       flexion 3/5 left       internal rotation 3/5 left       external rotation 3/5 left     Impingement testing:       positive (+) [place] left       " positive (+) Hicks left     Sensation:        normal sensation over shoulder and upper extremity      Bilateral Wrist and Hand exam     Inspection:       No swelling, bruising or deformity bilateral     Tender:       distal radius left and      anatomic snuffbox left     Non Tender:       Remainder of the Wrist and Hand bilateral     ROM:       Near normal ROM left wrist compared to right     Strength:       Slightly decreased ROM left wrist     Neurovascular:       2+ radial pulses bilaterally with brisk capillary refill and      normal sensation to light touch in the radial, median and ulnar nerve distributions     RADIOLOGY:  Final results and radiologist's interpretation, available in the [facility] health record.  Images were reviewed with the patient in the office today.  My personal interpretation of the performed imaging:  MR left Shoulder - partial RC tear,       Results for orders placed or performed during the hospital encounter of 04/21/21   MR Shoulder Left w/o Contrast    Narrative    EXAM: MR left shoulder without contrast 4/21/2021 4:35 PM    TECHNIQUE: Multiplanar, multisequence imaging of the left shoulder  were obtained without administration of intravenous or intra-articular  gadolinium contrast using routine protocol.    History: eval RC tear, concern for fracture; Acute pain of left  shoulder    Comparison: Radiographs 4/21/2021    Findings:    Motion partially compromising assessment.    ROTATOR CUFF and ASSOCIATED STRUCTURES  Rotator cuff: On a background of tendinosis, moderate grade  intrasubstance tear of the supraspinatus anterior fibers at the  footprint with articular sided low grade tear extension posteriorly to  the posterior fibers of the supraspinatus. Infraspinatus tendinosis.  Teres minor tendon is intact. Subscapularis tendinosis.    Bursa: No subacromial or subdeltoid bursal fluid.    Musculature: Muscle bulk of rotator cuff is preserved.  Deltoid muscle  bulk is also preserved.  No  muscle edema.    Acromioclavicular joint  There are mild degenerative changes of the acromioclavicular joint.  Bony proliferative change of the distal clavicle particularly  superiorly, may be related to prior trauma. Acromion is type 2 in  sagittal morphology.  Coracoacromial ligament is not thickened.    OSSEOUS STRUCTURES  No fracture, marrow contusion or marrow infiltration.    LONG BICIPITAL TENDON  Medial subluxation of long head of biceps tendon within the  intertubercular groove. No complete or partial biceps tendon tear is  present.    GLENOHUMERAL JOINT  Joint fluid: Physiologic amount of joint fluid is  present.    Cartilage and subarticular bone:  Focal subchondral cystlike change in  the posterior inferior glenoid likely reflecting presence of overlying  chondral loss though motion compromising disc assessment. Glenoid  marginal osteophytes.    Labrum: Limited assessment on this study with relative lack of joint  distention shows possible posterior superior labral tear with subtle  subchondral edema-like marrow signal intensity.    ANCILLARY FINDINGS:      Impression    Impression:  Motion partially compromising assessment.  1. On a background of tendinosis, moderate grade intrasubstance tear  of the supraspinatus anterior fibers at the footprint with articular  sided low grade tear extension posteriorly to the posterior fibers of  the supraspinatus.  No muscle bulk loss.  2. Medial subluxation of long head of biceps tendon within the  intertubercular groove.   3. Possible posterior superior labral tear.  4. Glenohumeral joint osteoarthritis with focal subchondral cystlike  change in the posterior inferior glenoid, likely reflecting presence  of overlying chondral loss.    MISTI LYNN       Review of the result(s) of each unique test - XR           Again, thank you for allowing me to participate in the care of your patient.        Sincerely,        Annia Fernández MD

## 2021-05-17 ENCOUNTER — OFFICE VISIT (OUTPATIENT)
Dept: OBGYN | Facility: CLINIC | Age: 51
End: 2021-05-17
Payer: MEDICARE

## 2021-05-17 ENCOUNTER — NURSE TRIAGE (OUTPATIENT)
Dept: OBGYN | Facility: CLINIC | Age: 51
End: 2021-05-17

## 2021-05-17 VITALS
HEART RATE: 84 BPM | DIASTOLIC BLOOD PRESSURE: 58 MMHG | BODY MASS INDEX: 22.96 KG/M2 | WEIGHT: 129.6 LBS | SYSTOLIC BLOOD PRESSURE: 98 MMHG

## 2021-05-17 DIAGNOSIS — Z01.812 PRE-PROCEDURE LAB EXAM: Primary | ICD-10-CM

## 2021-05-17 DIAGNOSIS — Z30.430 ENCOUNTER FOR IUD INSERTION: ICD-10-CM

## 2021-05-17 DIAGNOSIS — Z30.430 ENCOUNTER FOR INSERTION OF INTRAUTERINE CONTRACEPTIVE DEVICE: ICD-10-CM

## 2021-05-17 LAB — HCG UR QL: NEGATIVE

## 2021-05-17 PROCEDURE — 58300 INSERT INTRAUTERINE DEVICE: CPT | Performed by: OBSTETRICS & GYNECOLOGY

## 2021-05-17 PROCEDURE — 81025 URINE PREGNANCY TEST: CPT | Performed by: OBSTETRICS & GYNECOLOGY

## 2021-05-17 NOTE — TELEPHONE ENCOUNTER
I spoke to the Pharmacy and they did clarify this rx. I did let pharmacy know of Dr Henao note to check with patient to see if patient is seeing the [facility] yet? Patient has moved out of this area.     The clarification they got was from Dr. Henao. I also let pharmacy know we tried to reach patient several times but were unable to reach patient.     Prema Beth RN

## 2021-05-17 NOTE — PROGRESS NOTES
Encounter for IUD insertion     CC:  IUD insertion  HPI:  Suzi Mckoy is a 51 year old female Patient's last menstrual period was 05/10/2021 (approximate). No other c/o.  She is here for an IUD insertion. Patient has verbalized understanding of risks and benefits and has signed the consent form.    Manogomous relationship? Yes    Reviewed with patient in office    Allergies: Dilantin [phenytoin] and No clinical screening - see comments    Current Outpatient Medications   Medication Sig Dispense Refill     Acetaminophen (MIDOL PO) Take by mouth as needed       clindamycin (CLEOCIN T) 1 % external solution Apply topically 2 times daily 60 mL 3     felbamate (FELBATOL) 600 MG tablet Take 1 tablet (600 mg) by mouth 3 times daily 270 tablet 1     felbamate (FELBATOL) 600 MG tablet Take 600 mg by mouth 3 times daily 1000, 1600, 2200       levETIRAcetam (KEPPRA) 500 MG tablet Take 2 tablets (1,000 mg) by mouth 3 times daily 540 tablet 1     levETIRAcetam (KEPPRA) 500 MG tablet Take 1,000 mg by mouth 3 times daily        benzocaine-menthol (CEPACOL) 15-3.6 MG lozenge Place 1 lozenge inside cheek every 2 hours as needed for moderate pain       benzoyl peroxide 5 % external liquid Apply topically 2 times daily (Patient not taking: Reported on 5/17/2021) 226 g 3     cetirizine (ZYRTEC) 10 MG tablet Take 1 tablet (10 mg) by mouth daily (Patient not taking: Reported on 5/17/2021) 30 tablet 3     dextromethorphan (DELSYM) 30 MG/5ML liquid Take 10 mLs (60 mg) by mouth 2 times daily (Patient not taking: Reported on 5/17/2021) 148 mL 1     fluticasone (FLONASE) 50 MCG/ACT nasal spray Spray 1 spray into both nostrils daily (Patient not taking: Reported on 5/17/2021) 16 g 3     ibuprofen (ADVIL/MOTRIN) 200 MG capsule Take 200 mg by mouth as needed for fever       lamoTRIgine (LAMICTAL) 100 MG tablet Take 2 tablets (200 mg) by mouth 2 times daily (Patient not taking: Reported on 5/17/2021) 360 tablet 0     lamoTRIgine (LAMICTAL)  100 MG tablet Take 200 mg by mouth 2 times daily 1000, 2200       MIRENA 20 MCG/24HR IU IUD insert per routine (Patient not taking: No sig reported) 1 0     omeprazole (PRILOSEC) 20 MG DR capsule Take 1 capsule (20 mg) by mouth daily (Patient not taking: Reported on 5/17/2021) 30 capsule 3     order for DME Equipment being ordered: Walker Wheels () and Walker ()  Treatment Diagnosis: Gait instability (Patient not taking: Reported on 11/4/2020) 1 each 0         ROS:  C: NEGATIVE for fever, chills, change in weight  R: NEGATIVE for significant cough or SOB  CV: NEGATIVE for chest pain, palpitations or peripheral edema  GI: NEGATIVE for nausea, abdominal pain, heartburn, or change in bowel habits  : NEGATIVE for frequency, dysuria, hematuria, vaginal discharge, or irregular bleeding      EXAM:  Blood pressure 98/58, pulse 84, weight 58.8 kg (129 lb 9.6 oz), last menstrual period 05/10/2021, not currently breastfeeding.  General - pleasant female in no acute distress.  Pelvic - EG: normal adult female, BUS: within normal limits, Vagina: well rugated, no discharge, Cervix: no lesions or CMT, Uterus: firm, normal sized and nontender, Adnexae: no masses or tenderness.    PROCEDURE:  After informed consent was obtained from the patient, a speculum was placed in the vagina to visualized the cervix.  The cervix was then swabbed with a betadine prep.  Tenaculum was placed at the 12 o'clock position on the cervix and the uterus sounded to 8 cm.  The Mirena  IUD was then placed in the usual fashion under sterile technique.  Strings were clipped about 2 cm from the cervical os.  Tenaculum was removed and cervix was hemostatic. There were no complications. The patient tolerated the procedure well.      ASSESSMENT/PLAN:  (Z01.812) Pre-procedure lab exam  (primary encounter diagnosis)  Plan: HCG qualitative urine - CSC and Range    Encounter for IUD insertion   -- The patient should feel for the IUD strings after her  next menses.  If unable to locate them, she should return to clinic for a speculum examination for confirmation that the IUD is in place. Bleeding pattern of this particular IUD was discussed with the patient. She is aware that the IUD will need to be removed in 5 years or PRN.  She is to return to clinic for her next annual or PRN.      Codey Murcia MD  [facility]

## 2021-05-17 NOTE — TELEPHONE ENCOUNTER
"Patient called the clinic reporting she had an IUD placed by Dr. Murcia at  OB/GYN (appt at 3:15) and now has 20/10 lower abdominal pain and feels that she is going to faint. Denied bleeding or vaginal discharge. Denies vomiting. Advised to go to UC or ER as soon as possible due to severe pain. She is unable to drive to ER or UC and does not have a ride. She is with somebody, but he/she is sleeping. Advised to wake them up to alert of situation and call 911. Pt said she \"will see what she can do\" and wants an urgent message sent to Dr. Murcia. Done.    Reason for Disposition    Patient sounds very sick or weak to the triager    Constant abdominal pain and present > 2 hours    SEVERE abdominal pain (e.g., excruciating) and present > 1 hour    Additional Information    Negative: SEVERE vaginal bleeding (e.g., soaking 2 pads or tampons per hour) and present 2 or more hours    Negative: MODERATE vaginal bleeding (e.g., soaking 1 pad or tampon per hour and present > 6 hours)    Protocols used: CONTRACEPTION - IUD SYMPTOMS AND [id number]      "

## 2021-05-17 NOTE — NURSING NOTE
"Chief Complaint   Patient presents with     Contraception     Mirena IUD        Initial BP 98/58 (BP Location: Left arm, Cuff Size: Adult Regular)   Pulse 84   Wt 58.8 kg (129 lb 9.6 oz)   LMP 05/10/2021 (Approximate)   Breastfeeding No   BMI 22.96 kg/m   Estimated body mass index is 22.96 kg/m  as calculated from the following:    Height as of 21: 1.6 m (5' 3\").    Weight as of this encounter: 58.8 kg (129 lb 9.6 oz).  BP completed using cuff size: regular    Questioned patient about current smoking habits.  Pt. quit smoking some time ago.          The following HM Due: NONE      Orly Mcgowan, PETR on 2021 at 3:11 PM       "

## 2021-05-18 NOTE — TELEPHONE ENCOUNTER
Spoke with pt. States that the pain seems better. She will continue to evaluate and report to the ED if her pain worsens again.  Lety OBRIEN RN

## 2021-05-18 NOTE — TELEPHONE ENCOUNTER
I agree with medical advice given to patient. Given her extreme pain and discomfort, it would be advised that she go to urgent care/emergency department for evaluation and possible removal of IUD to alleviate her pain.     Codey Murcia MD

## 2021-06-21 ENCOUNTER — TELEPHONE (OUTPATIENT)
Dept: ORTHOPEDICS | Facility: CLINIC | Age: 51
End: 2021-06-21

## 2021-06-21 NOTE — TELEPHONE ENCOUNTER
Patient is currently scheduled for left shoulder f/u on 6/23/21 with Dr. Fernández.  Per Dr. Fernández, she has previously referred Suzi for ortho surgery for her left shoulder.   Asked that we can to verify appointment, as she has also not undergone an MRI which was ordered for her left wrist.     LVM for patient to call back to verify that appointment is needed.     Vivienne Dos Santos MS ATC

## 2021-06-23 ENCOUNTER — OFFICE VISIT (OUTPATIENT)
Dept: ORTHOPEDICS | Facility: CLINIC | Age: 51
End: 2021-06-23
Payer: MEDICARE

## 2021-06-23 VITALS
BODY MASS INDEX: 23.04 KG/M2 | WEIGHT: 130 LBS | DIASTOLIC BLOOD PRESSURE: 58 MMHG | HEIGHT: 63 IN | SYSTOLIC BLOOD PRESSURE: 98 MMHG

## 2021-06-23 DIAGNOSIS — M75.112 INCOMPLETE TEAR OF LEFT ROTATOR CUFF, UNSPECIFIED WHETHER TRAUMATIC: Primary | ICD-10-CM

## 2021-06-23 PROCEDURE — 99213 OFFICE O/P EST LOW 20 MIN: CPT | Mod: 25 | Performed by: PEDIATRICS

## 2021-06-23 PROCEDURE — 20610 DRAIN/INJ JOINT/BURSA W/O US: CPT | Mod: LT | Performed by: PEDIATRICS

## 2021-06-23 RX ADMIN — LIDOCAINE HYDROCHLORIDE 2 ML: 10 INJECTION, SOLUTION INFILTRATION; PERINEURAL at 16:57

## 2021-06-23 RX ADMIN — TRIAMCINOLONE ACETONIDE 40 MG: 40 INJECTION, SUSPENSION INTRA-ARTICULAR; INTRAMUSCULAR at 16:57

## 2021-06-23 ASSESSMENT — MIFFLIN-ST. JEOR: SCORE: 1183.81

## 2021-06-23 NOTE — PROGRESS NOTES
ASSESSMENT & PLAN    Suzi was seen today for recheck.    Diagnoses and all orders for this visit:    Incomplete tear of left rotator cuff, unspecified whether traumatic  -     Large Joint Injection/Arthocentesis: L subacromial bursa      This issue is acute and Unchanged.    Reviewed MRI and prior discussion with orthopedic surgery referral. Patient no longer wants to consult with orthopedic surgery, she wants to consider an injection.    Patient request a cortisone injection today.  Reviewed the diagnosis - rotator cuff partial tear and discussed the utility of corticosteroid injections (pain relief only).  Stressed the importance of physical therapy to strengthen shoulder and increased liklihood of pain returning if injection is not coupled with therapy.    Patient has a listed allergy to generic medications. She states she has avoided them since being a child, is unsure what the reaction was, has just been told to avoid them.  - I had a long discussion about this as our injectables are generic. Patient wanted to proceed with the injection knowing this possible risk and reaction. She thought her prior allergy was mostly to oral medications. Reviewed the medications we would use, she has had lidocaine in the past (Breast Biopsy 7/3/2020). Patient elected to proceed with the injection.    Plan:  - Today's Plan of Care:  Rehab: Physical Therapy: [facility] - [phone]  Steroid injection of the left shoulder: subacromial space was performed today in clinic  Icing for the next 1-2 days may be helpful for pain. Injection may take 10-14 days to see the full effect.    MRI wrist was ordered - discussed scheduling. Patient has not obtained the prior ordered wrist MRI.    -We also discussed other future treatment options:  Referral to Orthopedic Surgery    Follow Up: as needed    Concerning signs and symptoms were reviewed.  The patient expressed understanding of this management plan and all questions were  "answered at this time.    Annia Fernández MD [facility]  Sports Medicine Physician  [facility]      SUBJECTIVE- Interim History June 23, 2021    Chief Complaint   Patient presents with     Left Shoulder - RECHECK       Suzi Mckoy is a 51 year old female who is seen in f/u up for Incomplete tear of left rotator cuff, unspecified whether traumatic. Since last visit on 5/14/21 patient has continued to have pain in her shoulder.  - She was referred to orthopedic surgery, however, she has not seen a surgeon and does not want to see a surgeon. She has pain with laying on her left side.  - She wants to have an injection for pain.  - Now ~ 8 weeks from initial injury    Worsened by: laying on her left side as well as with her arm overhead.    Better with: topical analgesic  Treatments tried: rest/activity avoidance  Associated symptoms:  no distal numbness or tingling; denies swelling or warmth and weakness of arm    The patient is seen by themselves.  The patient is Right handed    Orthopedic/Surgical history: NO  Social History/Occupation: unemployed     No family history pertinent to patient's problem today.    REVIEW OF SYSTEMS:  Review of Systems  Skin: no bruising, no swelling  Musculoskeletal: as above  Neurologic: no numbness, paresthesias  Remainder of review of systems is negative including constitutional, CV, pulmonary, GI, except as noted in HPI or medical history.    OBJECTIVE:  BP 98/58   Ht 1.6 m (5' 3\")   Wt 59 kg (130 lb)   BMI 23.03 kg/m       General: healthy, alert and in no distress  HEENT: no scleral icterus or conjunctival erythema  Skin: no suspicious lesions or rash. No jaundice.  CV: distal perfusion intact  Resp: normal respiratory effort without conversational dyspnea   Psych: normal mood and affect  Gait: slightly antalgic  Neuro: Normal light sensory exam of upper and lower extremity, weakness on left side     Bilateral Shoulder exam  Inspection and Posture:       rounded " shoulders and upper back     Skin:        no visible deformities     Tender:        subacromial space left     Non Tender:       remainder of shoulder bilateral     ROM:        forward flexion 130  left       abduction 120 left       internal rotation glueal left       external rotation 35 left       asymmetric scapular motion     Painful motions:       end range flexion and elevation left     Strength:        abduction 3/5 left       flexion 3/5 left       internal rotation 3/5 left       external rotation 3/5 left     Impingement testing:       positive (+) Neer left       positive (+) Hicks left     Sensation:        normal sensation over shoulder and upper extremity    RADIOLOGY:  Final results and radiologist's interpretation, available in the [facility] health record.  Images were reviewed with the patient in the office today.  My personal interpretation of the performed imaging:  MR left Shoulder - 4/21/2021 partial RC tear      Large Joint Injection/Arthocentesis: L subacromial bursa    Date/Time: 6/23/2021 4:57 PM  Performed by: Annia Fernández MD  Authorized by: Annia Fernández MD     Indications:  Pain  Needle Size:  25 G  Guidance: landmark guided    Approach:  Posterior  Location:  Shoulder      Site:  L subacromial bursa  Medications:  2 mL lidocaine 1 %; 40 mg triamcinolone 40 MG/ML  Outcome:  Tolerated well, no immediate complications  Consent Given by:  Patient  Prep: patient was prepped and draped in usual sterile fashion     The risks, benefits and complications of steroid injection were discussed with the patient (including but not limited to: bleeding, infection, pain, scar, damage to adjacent structures, atrophy or necrosis of soft tissue, skin blanching, failure to relieve symptoms, worsening of symptoms, allergic reaction). After this discussion all questions were addressed and answered and the patient elected to proceed. The patient tolerated the procedure well without complications.  Also  discussed that if diabetic, recommend close monitoring of blood sugars over the next week as cortisone injections can temporarily elevate blood sugars.          Review of the result(s) of each unique test - MRI

## 2021-06-23 NOTE — PATIENT INSTRUCTIONS
Patient request a cortisone injection today.  Reviewed the diagnosis - rotator cuff partial tear and discussed the utility of corticosteroid injections (pain relief only).  Stressed the importance of physical therapy to strengthen shoulder and increased liklihood of pain returning if injection is not coupled with therapy.    Plan:  - Today's Plan of Care:  Rehab: Physical Therapy: [facility] - [phone]  Steroid injection of the left shoulder: subacromial space was performed today in clinic  Icing for the next 1-2 days may be helpful for pain. Injection may take 10-14 days to see the full effect.    MRI wrist was ordered - discussed scheduling    -We also discussed other future treatment options:  Referral to Orthopedic Surgery    Follow Up: as needed    If you have any further questions for your physician or physician s care team you can call [phone] and use option 3 to leave a voice message. Calls received during business hours will be returned same day.    After the Injection     After the injection, strenuous and repetitive activity should be minimized for approximately 48 hours.   Ice should be applied to the injected area at least for the next 48 hours.   Apply ice to the injected area at least 3 - 4 times a day for 20 minutes each time for the next 48 hours. This can reduce the painful  flare  reaction that can follow an injection the next day. This reaction can cause the area that was injected to hurt more the next day just from the injection. This will resolve within a day if it does occur.     Use over-the-counter pain medications such as Tylenol to help with the pain if necessary.     After 48 hours, icing the area may be continued if you find it beneficial.     The lidocaine or marcaine (commonly called Novocain) is an anesthetic agent that is injected with the steroid will typically relieve your pain for a few hours following the injection. If the  Novocain  and steroid are injected  near a nerve, you may experience local numbness or weakness from the nerve block until it wears off. After this wears off your pain may return until the steroid takes effect.   The steroid may be effective immediately after the injection. Do not be concerned if the injection is not effective in relieving your symptoms immediately. In some cases, it may take up to two weeks for the steroid to work.   If you are diabetic, the corticosteroid may cause your blood sugar to become elevated for several days following the injection. This response usually lasts about 2-4 days before it returns to your normal level.   You should report any adverse reaction to you doctor. Call if there are any questions.

## 2021-06-23 NOTE — LETTER
6/23/2021         RE: Suzi Mckoy  [address]  [place] [address]        Dear Colleague,    Thank you for referring your patient, Suzi Mckoy, to the [facility] [place]. Please see a copy of my visit note below.    ASSESSMENT & PLAN    Suzi was seen today for recheck.    Diagnoses and all orders for this visit:    Incomplete tear of left rotator cuff, unspecified whether traumatic  -     Large Joint Injection/Arthocentesis: L subacromial bursa      This issue is acute and Unchanged.    Reviewed MRI and prior discussion with orthopedic surgery referral. Patient no longer wants to consult with orthopedic surgery, she wants to consider an injection.    Patient request a cortisone injection today.  Reviewed the diagnosis - rotator cuff partial tear and discussed the utility of corticosteroid injections (pain relief only).  Stressed the importance of physical therapy to strengthen shoulder and increased liklihood of pain returning if injection is not coupled with therapy.    Patient has a listed allergy to generic medications. She states she has avoided them since being a child, is unsure what the reaction was, has just been told to avoid them.  - I had a long discussion about this as our injectables are generic. Patient wanted to proceed with the injection knowing this possible risk and reaction. She thought her prior allergy was mostly to oral medications. Reviewed the medications we would use, she has had lidocaine in the past (Breast Biopsy 7/3/2020). Patient elected to proceed with the injection.    Plan:  - Today's Plan of Care:  Rehab: Physical Therapy: [facility] - [phone]  Steroid injection of the left shoulder: subacromial space was performed today in clinic  Icing for the next 1-2 days may be helpful for pain. Injection may take 10-14 days to see the full effect.    MRI wrist was ordered - discussed scheduling. Patient has not obtained the  "prior ordered wrist MRI.    -We also discussed other future treatment options:  Referral to Orthopedic Surgery    Follow Up: as needed    Concerning signs and symptoms were reviewed.  The patient expressed understanding of this management plan and all questions were answered at this time.    Annia Fernández MD [facility]  Sports Medicine Physician  [facility]      SUBJECTIVE- Interim History June 23, 2021    Chief Complaint   Patient presents with     Left Shoulder - RECHECK       Suzi Mckoy is a 51 year old female who is seen in f/u up for Incomplete tear of left rotator cuff, unspecified whether traumatic. Since last visit on 5/14/21 patient has continued to have pain in her shoulder.  - She was referred to orthopedic surgery, however, she has not seen a surgeon and does not want to see a surgeon. She has pain with laying on her left side.  - She wants to have an injection for pain.  - Now ~ 8 weeks from initial injury    Worsened by: laying on her left side as well as with her arm overhead.    Better with: topical analgesic  Treatments tried: rest/activity avoidance  Associated symptoms:  no distal numbness or tingling; denies swelling or warmth and weakness of arm    The patient is seen by themselves.  The patient is Right handed    Orthopedic/Surgical history: NO  Social History/Occupation: unemployed     No family history pertinent to patient's problem today.    REVIEW OF SYSTEMS:  Review of Systems  Skin: no bruising, no swelling  Musculoskeletal: as above  Neurologic: no numbness, paresthesias  Remainder of review of systems is negative including constitutional, CV, pulmonary, GI, except as noted in HPI or medical history.    OBJECTIVE:  BP 98/58   Ht 1.6 m (5' 3\")   Wt 59 kg (130 lb)   BMI 23.03 kg/m       General: healthy, alert and in no distress  HEENT: no scleral icterus or conjunctival erythema  Skin: no suspicious lesions or rash. No jaundice.  CV: distal perfusion intact  Resp: " normal respiratory effort without conversational dyspnea   Psych: normal mood and affect  Gait: slightly antalgic  Neuro: Normal light sensory exam of upper and lower extremity, weakness on left side     Bilateral Shoulder exam  Inspection and Posture:       rounded shoulders and upper back     Skin:        no visible deformities     Tender:        subacromial space left     Non Tender:       remainder of shoulder bilateral     ROM:        forward flexion 130  left       abduction 120 left       internal rotation glueal left       external rotation 35 left       asymmetric scapular motion     Painful motions:       end range flexion and elevation left     Strength:        abduction 3/5 left       flexion 3/5 left       internal rotation 3/5 left       external rotation 3/5 left     Impingement testing:       positive (+) Neer left       positive (+) Hicks left     Sensation:        normal sensation over shoulder and upper extremity    RADIOLOGY:  Final results and radiologist's interpretation, available in the [facility] health record.  Images were reviewed with the patient in the office today.  My personal interpretation of the performed imaging:  MR left Shoulder - 4/21/2021 partial RC tear      Large Joint Injection/Arthocentesis: L subacromial bursa    Date/Time: 6/23/2021 4:57 PM  Performed by: Annia Fernández MD  Authorized by: Annia Fernández MD     Indications:  Pain  Needle Size:  25 G  Guidance: landmark guided    Approach:  Posterior  Location:  Shoulder      Site:  L subacromial bursa  Medications:  2 mL lidocaine 1 %; 40 mg triamcinolone 40 MG/ML  Outcome:  Tolerated well, no immediate complications  Consent Given by:  Patient  Prep: patient was prepped and draped in usual sterile fashion     The risks, benefits and complications of steroid injection were discussed with the patient (including but not limited to: bleeding, infection, pain, scar, damage to adjacent structures, atrophy or necrosis of soft  tissue, skin blanching, failure to relieve symptoms, worsening of symptoms, allergic reaction). After this discussion all questions were addressed and answered and the patient elected to proceed. The patient tolerated the procedure well without complications.  Also discussed that if diabetic, recommend close monitoring of blood sugars over the next week as cortisone injections can temporarily elevate blood sugars.          Review of the result(s) of each unique test - MRI             Again, thank you for allowing me to participate in the care of your patient.        Sincerely,        Annia Fernández MD

## 2021-06-24 RX ORDER — TRIAMCINOLONE ACETONIDE 40 MG/ML
40 INJECTION, SUSPENSION INTRA-ARTICULAR; INTRAMUSCULAR
Status: SHIPPED | OUTPATIENT
Start: 2021-06-23

## 2021-06-24 RX ORDER — LIDOCAINE HYDROCHLORIDE 10 MG/ML
2 INJECTION, SOLUTION INFILTRATION; PERINEURAL
Status: SHIPPED | OUTPATIENT
Start: 2021-06-23

## 2021-07-20 NOTE — MR AVS SNAPSHOT
After Visit Summary   12/5/2018    Suzi Mckoy    MRN: [id number]           Patient Information     Date Of Birth          1970        Visit Information        Provider Department      12/5/2018 2:00 PM Ysabel Strange APRN [facility]        Today's Diagnoses     Encounter for IUD removal and reinsertion    -  1    Pap smear for cervical cancer screening        Pre-procedure lab exam        Routine screening for STI (sexually transmitted infection)          Care Instructions    Your Intrauterine Device (IUD)     What to watch for right after IUD placement:      Some women may experience uterine cramps, bleeding, and/or dizziness during and right after IUD placement.       To help minimize the cramps, you may take ibuprofen 600 mg with food. These symptoms should improve over the next 24 hours.  Mild cramping may be present for a few days after IUD placement. You may continue taking ibuprofen as directed if needed.      You may experience spotting or bleeding for the first few weeks to months after IUD placement.        Other side effects can include:  Anemia, hemorrhage, partial or complete expulsion of device, uterine or cervical perforation, embedding of IUD in the uterine wall, increased risk of pelvic inflammatory disease.  Women who become pregnant with an IUD in place are at higher risk of an ectopic pregnancy.  There is also a higher risk of miscarriage when pregnancy occurs with an IUD in place.      Use condoms or abstain from sex for 7 days after the insertion of your Mirena or Kyleena or Sommer  IUD.  This is not necessary if you had a ParaGard IUD placed.      If you experience fever, chills, abdominal pain, worsening pelvic pain, dizziness, unusually heavy vaginal bleeding, suspected expulsion of device or foul smelling vaginal discharge please call the clinic for evaluation.      Please schedule an appointment at the clinic for a string check  4-6 weeks after IUD placement    Your periods may change (Sommer/Kyleena/Mirena):      For the first 3 to 6 months, your monthly period may become irregular. You may also have frequent spotting or light bleeding. A few women have heavy bleeding during this time. After your body adjusts, the number of bleeding days is likely to decrease (but may remain irregular), and you may even find that your periods stop altogether for as long as Sommer/Mirena is in place.  Your periods will return rapidly once the IUD is removed.      ParaGard IUD users:      ParaGard IUD users may experience heavier than normal cycles while their IUD is in place, this is considered normal     Back-up contraception is not needed      Checking for your strings:      We encourage everyone with an IUD in place to check for their strings monthly      You may check your own IUD strings by inserting a finger into the vagina and feeling the strings as they exit the cervix.  The strings will initially feel firm, like fishing line, but will soften over a few weeks.  After the strings have softened, you or your partner should not be able to feel the strings during intercourse.       If the string length greatly changes or if you cannot feel your strings at all please make an appointment to see you midwife and use a backup method of contraception like a condom.      If you can feel something hard/plastic like the IUD may not be in the correct place. You should then see your healthcare provider to have the position confirmed with ultrasound.       Remember:    IUD's do not protect against HIV or STIs.  IUD's do not prevent the formation of ovarian cysts.  IUD's do not typically reduce acne or cause weight gain or mood changes.    For more information:  Http://www.mirena-us.com/    The ParaGard IUD is effective for 10 years.  The Sommer IUD is effective for 3 years.  The Kyleena/Mirena IUD is effective for 5 years.  Your IUD can easily be removed by a  healthcare professional at any time.    Do not try to remove the IUD yourself.      If you have questions or concerns please call:    [facility]   [phone]    Sexually Transmitted Infections (STIs)    Many STIs do not have any symptoms and can be transmitted through any type of sex, not just intercourse, but also anal, oral, and other types of sex play. Some STIs are transmitted by bacteria and others by viruses. It is important to keep yourself safe and infection free as many STIs have long term side effects.     Common STIs    Chlamydia  Gonorrhea   Genital Herpes  Genital warts/HPV (some strains of HPV cause cervical cancer)  Hepatitis A, B, & C  Syphilis   Trichomoniasis     Who should be screened?      Screening is available to anyone who wishes to be test, some tests are from a blood draw and some are a vaginal swab    Screening should be done even if people have no symptoms or feel fine    Women who have had unprotected sex with a new partner    Women who have had sex with more than one partner should be screened yearly for gonorrhea and chlamydia     [facility] also recommends women aged 25 and younger should be screened yearly for gonorrhea and chlamydia    Everyone should be screened at least once for HIV    Pregnant women are screened for STIs at their first OB visit    We can do all the screenings you need right here in clinic    If any screenings come back positive we will get you treated with the appropriate medications. Your partner (s) will also need to be screened and treated by their providers.    How to protect yourself      The most effective way to protect yourself from getting an STI is by using a condom every time that you have sex. (Remember male condoms made out of natural materials do not protect against STIs)    Ask us about vaccines, if you are under the age of 26 we can start a vaccine series for HPV prevention.    If you or your partner have herpes make sure to use a  "condom or abstain from sexual intercourse/genital contact when you have active lesions. Also avoid oral sex when you or your partner have cold sores    There is no surefire way to prevent all STIs from being transmitted but proper screening and the methods above can help to reduce your risk!    Please ask your midwife at [facility]  if you have any questions             Follow-ups after your visit        Follow-up notes from your care team     Return in about 6 weeks (around 1/16/2019), or if symptoms worsen or fail to improve, for Office visit.      Who to contact     If you have questions or need follow up information about today's clinic visit or your schedule please contact [facility] directly at [phone].  Normal or non-critical lab and imaging results will be communicated to you by MyChart, letter or phone within 4 business days after the clinic has received the results. If you do not hear from us within 7 days, please contact the clinic through [employer] or phone. If you have a critical or abnormal lab result, we will notify you by phone as soon as possible.  Submit refill requests through [employer] or call your pharmacy and they will forward the refill request to us. Please allow 3 business days for your refill to be completed.          Additional Information About Your Visit        [employer] Information     [employer] lets you send messages to your doctor, view your test results, renew your prescriptions, schedule appointments and more. To sign up, go to [email or web address] . Click on \"Log in\" on the left side of the screen, which will take you to the Welcome page. Then click on \"Sign up Now\" on the right side of the page.     You will be asked to enter the access code listed below, as well as some personal information. Please follow the directions to create your username and password.     Your access code is: [email or web address]  Expires: 3/5/2019  2:50 PM     Your access " "code will  in 90 days. If you need help or a new code, please call your [place] clinic or [phone].        Care EveryWhere ID     This is your Care EveryWhere ID. This could be used by other organizations to access your [place] medical records  [id number]        Your Vitals Were     Pulse Height Breastfeeding? BMI (Body Mass Index)          80 5' 3\" (1.6 m) No 23.91 kg/m2         Blood Pressure from Last 3 Encounters:   18 108/60   18 115/67   17 116/56    Weight from Last 3 Encounters:   18 135 lb (61.2 kg)   18 135 lb (61.2 kg)   17 125 lb (56.7 kg)              We Performed the Following     Beta HCG Qual, Urine - [facility] and [place] ([id number])     Chlamydia trachomatis PCR     HC LEVONORGESTREL IU 52MG 5 YR     HPV High Risk Types DNA Cervical     INSERTION INTRAUTERINE DEVICE     Neisseria gonorrhoeae PCR     Pap imaged thin layer screen with HPV - recommended age 30 - 65 years (select HPV order below)     REMOVE INTRAUTERINE DEVICE          Today's Medication Changes          These changes are accurate as of 18  2:50 PM.  If you have any questions, ask your nurse or doctor.               These medicines have changed or have updated prescriptions.        Dose/Directions    * MIRENA (52 MG) 20 MCG/24HR IUD   This may have changed:  Another medication with the same name was added. Make sure you understand how and when to take each.   Used for:  Encounter for insertion or removal of intrauterine contraceptive device   Generic drug:  levonorgestrel   Changed by:  Ysabel Strange APRN CNM        insert per routine   Quantity:  1   Refills:  0       * levonorgestrel 20 MCG/24HR IUD   Commonly known as:  MIRENA (52 MG)   This may have changed:  You were already taking a medication with the same name, and this prescription was added. Make sure you understand how and when to take each.   Used for:  Encounter for IUD removal and reinsertion   Changed by:  " Ysabel Strange, IOANA CALIX        Dose:  1 each   1 each (20 mcg) by Intrauterine route once for 1 dose   Quantity:  1 each   Refills:  0       * Notice:  This list has 2 medication(s) that are the same as other medications prescribed for you. Read the directions carefully, and ask your doctor or other care provider to review them with you.         Where to get your medicines      Some of these will need a paper prescription and others can be bought over the counter.  Ask your nurse if you have questions.     You don't need a prescription for these medications     levonorgestrel 20 MCG/24HR IUD                Primary Care Provider Fax #    Physician No Ref-Primary [phone]       No address on file        Equal Access to Services     MIKKI SUTTON : Haley Westbrook, khushboo sim, mal rodríguez, alyssa smart. So [facility] [phone].    ATENCIÓN: Si habla español, tiene a reis disposición servicios gratuitos de asistencia lingüística. Llame al [phone].    We comply with applicable federal civil rights laws and [place] laws. We do not discriminate on the basis of race, color, national origin, age, disability, sex, sexual orientation, or gender identity.            Thank you!     Thank you for choosing [facility]  for your care. Our goal is always to provide you with excellent care. Hearing back from our patients is one way we can continue to improve our services. Please take a few minutes to complete the written survey that you may receive in the mail after your visit with us. Thank you!             Your Updated Medication List - Protect others around you: Learn how to safely use, store and throw away your medicines at www.disposemymeds.org.          This list is accurate as of 12/5/18  2:50 PM.  Always use your most recent med list.                   Brand Name Dispense Instructions for use Diagnosis    FELBATOL 600 MG tablet    Generic drug:  felbamate      Take 1 tablet (600 mg) by mouth 3 times daily        KEPPRA 1000 MG tablet   Generic drug:  levETIRAcetam      Take 1,000 mg by mouth 3 times daily        lamoTRIgine 100 MG tablet    LaMICtal     Take 2 tablets (200 mg) by mouth at 8 AM and 8 PM and take 1 tablet (100 mg) every afternoon.        * MIRENA (52 MG) 20 MCG/24HR IUD   Generic drug:  levonorgestrel     1    insert per routine    Encounter for insertion or removal of intrauterine contraceptive device       * levonorgestrel 20 MCG/24HR IUD    MIRENA (52 MG)    1 each    1 each (20 mcg) by Intrauterine route once for 1 dose    Encounter for IUD removal and reinsertion       * Notice:  This list has 2 medication(s) that are the same as other medications prescribed for you. Read the directions carefully, and ask your doctor or other care provider to review them with you.       H

## 2021-09-22 ENCOUNTER — NURSE TRIAGE (OUTPATIENT)
Dept: NURSING | Facility: CLINIC | Age: 51
End: 2021-09-22

## 2021-09-22 ENCOUNTER — HOSPITAL ENCOUNTER (EMERGENCY)
Facility: CLINIC | Age: 51
Discharge: HOME | End: 2021-09-22
Attending: NURSE PRACTITIONER | Admitting: NURSE PRACTITIONER
Payer: MEDICARE

## 2021-09-22 VITALS
OXYGEN SATURATION: 98 % | SYSTOLIC BLOOD PRESSURE: 110 MMHG | TEMPERATURE: 97.9 F | RESPIRATION RATE: 18 BRPM | HEART RATE: 87 BPM | DIASTOLIC BLOOD PRESSURE: 68 MMHG

## 2021-09-22 DIAGNOSIS — G40.909 SEIZURE DISORDER (H): ICD-10-CM

## 2021-09-22 DIAGNOSIS — R53.83 FATIGUE: ICD-10-CM

## 2021-09-22 PROCEDURE — 99282 EMERGENCY DEPT VISIT SF MDM: CPT

## 2021-09-22 ASSESSMENT — ENCOUNTER SYMPTOMS
SEIZURES: 1
NAUSEA: 0
VOMITING: 0

## 2021-09-22 NOTE — ED NOTES
Bed: ED03  Expected date:   Expected time:   Means of arrival:   Comments:  [facility] - 515 - 51 F numbness eta 1318
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
## 2021-09-22 NOTE — DISCHARGE INSTRUCTIONS
You need to follow up with your Neurologist.     Please get your second COVID vaccine today as scheduled.

## 2021-09-22 NOTE — TELEPHONE ENCOUNTER
Having seizures, left side of body feels different.  Hard to breathe.  Hands and leg numb.    Left face numb too. Seizure 3 today.    She will go to ER now. She has a ride and he is bring ing her.    Protocol /computer was not up intime to tell her 911 so she was told ER.    Niya Gibson RN  [facility] Nurse Advisor      Reason for Disposition    Two or more seizures and stays confused between seizures    Additional Information    Negative: First seizure ever    Negative: Epileptic seizure (in adult with known epilepsy) and continues > 5 minutes    Protocols used: [id number]-[id number]

## 2022-01-05 ENCOUNTER — HOSPITAL ENCOUNTER (EMERGENCY)
Facility: CLINIC | Age: 52
Discharge: HOME | End: 2022-01-05
Attending: EMERGENCY MEDICINE | Admitting: EMERGENCY MEDICINE
Payer: COMMERCIAL

## 2022-01-05 VITALS
WEIGHT: 140 LBS | RESPIRATION RATE: 18 BRPM | DIASTOLIC BLOOD PRESSURE: 68 MMHG | TEMPERATURE: 97.6 F | HEIGHT: 63 IN | BODY MASS INDEX: 24.8 KG/M2 | OXYGEN SATURATION: 97 % | SYSTOLIC BLOOD PRESSURE: 114 MMHG | HEART RATE: 110 BPM

## 2022-01-05 DIAGNOSIS — R11.0 NAUSEA: ICD-10-CM

## 2022-01-05 LAB
ANION GAP SERPL CALCULATED.3IONS-SCNC: 7 MMOL/L (ref 3–14)
BUN SERPL-MCNC: 7 MG/DL (ref 7–30)
CALCIUM SERPL-MCNC: 8.6 MG/DL (ref 8.5–10.1)
CHLORIDE BLD-SCNC: 107 MMOL/L (ref 94–109)
CO2 SERPL-SCNC: 26 MMOL/L (ref 20–32)
CREAT SERPL-MCNC: 0.83 MG/DL (ref 0.52–1.04)
ERYTHROCYTE [DISTWIDTH] IN BLOOD BY AUTOMATED COUNT: 13.6 % (ref 10–15)
GFR SERPL CREATININE-BSD FRML MDRD: 86 ML/MIN/1.73M2
GLUCOSE BLD-MCNC: 151 MG/DL (ref 70–99)
HCT VFR BLD AUTO: 40.8 % (ref 35–47)
HGB BLD-MCNC: 12.7 G/DL (ref 11.7–15.7)
MCH RBC QN AUTO: 30 PG (ref 26.5–33)
MCHC RBC AUTO-ENTMCNC: 31.1 G/DL (ref 31.5–36.5)
MCV RBC AUTO: 97 FL (ref 78–100)
PLATELET # BLD AUTO: 342 10E3/UL (ref 150–450)
POTASSIUM BLD-SCNC: 4 MMOL/L (ref 3.4–5.3)
RBC # BLD AUTO: 4.23 10E6/UL (ref 3.8–5.2)
SODIUM SERPL-SCNC: 140 MMOL/L (ref 133–144)
WBC # BLD AUTO: 6.2 10E3/UL (ref 4–11)

## 2022-01-05 PROCEDURE — 250N000011 HC RX IP 250 OP 636: Performed by: EMERGENCY MEDICINE

## 2022-01-05 PROCEDURE — 36415 COLL VENOUS BLD VENIPUNCTURE: CPT | Performed by: EMERGENCY MEDICINE

## 2022-01-05 PROCEDURE — 99283 EMERGENCY DEPT VISIT LOW MDM: CPT

## 2022-01-05 PROCEDURE — 80048 BASIC METABOLIC PNL TOTAL CA: CPT | Performed by: EMERGENCY MEDICINE

## 2022-01-05 PROCEDURE — 85027 COMPLETE CBC AUTOMATED: CPT | Performed by: EMERGENCY MEDICINE

## 2022-01-05 RX ORDER — ONDANSETRON 4 MG/1
4 TABLET, ORALLY DISINTEGRATING ORAL EVERY 8 HOURS PRN
Qty: 10 TABLET | Refills: 0 | Status: SHIPPED | OUTPATIENT
Start: 2022-01-05 | End: 2022-01-08

## 2022-01-05 RX ORDER — ONDANSETRON 4 MG/1
4 TABLET, ORALLY DISINTEGRATING ORAL ONCE
Status: COMPLETED | OUTPATIENT
Start: 2022-01-05 | End: 2022-01-05

## 2022-01-05 RX ADMIN — ONDANSETRON 4 MG: 4 TABLET, ORALLY DISINTEGRATING ORAL at 07:30

## 2022-01-05 ASSESSMENT — ENCOUNTER SYMPTOMS
SHORTNESS OF BREATH: 0
VOMITING: 1
NAUSEA: 1

## 2022-01-05 ASSESSMENT — MIFFLIN-ST. JEOR: SCORE: 1229.17

## 2022-01-05 NOTE — ED NOTES
Patient was given cab number and current address, and notified that if she is unable to take a cab we have bus tokens. Patient then started to have seizure like activity, bed rail was placed and patient then grabbed and hung onto the railing. Patient now talking with provider.

## 2022-01-05 NOTE — ED PROVIDER NOTES
"  History   Chief Complaint:  Vomiting and nausea       HPI   Suzi Mckoy is a 51 year old female with history of stroke who presents via EMS with vomiting. En route, the patient had a 30 seconds seizure I the ES rig. No medication was given. The patient reports that she vomited 3 times with multiple colors that are white, red, and orange. She did eat today, some fruit lop, jellO, ad apple sauce. She consumed some alcohol beverages and states that she does not drink generally. She feels nauseous currently in the ER. She is homeless and states some concerns with her boyfriend who gets \"angry and mean\", therefore she is afraid of being there and wishes to find a hotel.       Review of Systems   Respiratory: Negative for shortness of breath.    Cardiovascular: Negative for chest pain.   Gastrointestinal: Positive for nausea and vomiting.   Psychiatric/Behavioral: Negative for suicidal ideas.   All other systems reviewed and are negative.      Allergies:  Dilantin    Medications:  Cepacol   Zyrtec   Delsym   Felbatol   Lamictal   Keppra  Mirena   Omeprazole       Past Medical History:     Anxiety              Bipolar 1 disorder, mixed (H)    Cerebral embolism with cerebral infarction (H)            Depressive disorder      Epilepsy complicating pregnancy, childbirth, or the puerperium, unspecified as to episode of care or not applicable(536.75)  Memory loss      Scoliosis   Breakthrough seizure   Conversion disorder   Hemiplegia and hemiparesis following cerebral infarction affecting left non-dominate side   Epilepsy   Suicidal behavior   Personality disorder         Past Surgical History:     Section   Craniotomy   Laser wart removal      Family History:    Father: heart disease,  problems, brain cancer    Social History:  Patient was unaccompanied to the ED.    Physical Exam     Patient Vitals for the past 24 hrs:   BP Temp Temp src Pulse Resp SpO2 Height Weight   22 0845 -- -- -- -- -- 97 % -- -- " "  01/05/22 0838 -- -- -- -- -- 98 % -- --   01/05/22 0653 114/68 97.6  F (36.4  C) Oral 110 18 97 % 1.6 m (5' 3\") 63.5 kg (140 lb)       Physical Exam  Eyes:  The pupils are equal and round    Conjunctivae and sclerae are normal  ENT:    The nose is normal    Pinnae are normal  CV:  Tachycardic and regular    No edema  Resp:  Lungs are clear    Non-labored    No rales    No wheezing   GI:  Abdomen is soft and non-tender, there is no rigidity    No distension    No rebound tenderness   MS:  Normal muscular tone    No asymmetric leg swelling  Skin:  No rash or acute skin lesions noted  Neuro:   Awake, alert.      Speech is normal and fluent.    Face is symmetric.     Moves all extremities  Psych:  Denies SI/HI    Emergency Department Course     Laboratory:  Labs Ordered and Resulted from Time of ED Arrival to Time of ED Departure   CBC WITH PLATELETS - Abnormal       Result Value    WBC Count 6.2      RBC Count 4.23      Hemoglobin 12.7      Hematocrit 40.8      MCV 97      MCH 30.0      MCHC 31.1 (*)     RDW 13.6      Platelet Count 342     BASIC METABOLIC PANEL - Abnormal    Sodium 140      Potassium 4.0      Chloride 107      Carbon Dioxide (CO2) 26      Anion Gap 7      Urea Nitrogen 7      Creatinine 0.83      Calcium 8.6      Glucose 151 (*)     GFR Estimate 86        Emergency Department Course:    Reviewed:  I reviewed nursing notes, vitals, past medical history and Care Everywhere    Assessments:  0710 I obtained history and examined the patient as noted above.   0842 I rechecked the patient and explained findings.     Interventions:  0730 Zofra 4mg oral    Disposition:  The patient was discharged to home.     Impression & Plan       Medical Decision Making:  Suzi Mckoy is a 51 year old female presents to the ED with primarily complaint of nausea and vomiting that started last night. She has multiple episodes of vomiting. Of note, she is still nauseous. She reports that she ate some food this morning, " including fruit loops, JellyO, and apple sauce. Her vomiting is multiple colors per history. Per exam she has no abdominal tenderness. She is alert and interacting appropriately. She did note that she drank alcohol last night, and now appears sobered. She has a normal hemoglobin and white blood cells count, as well as electrolyte panel only with mild glucose elevation. After some oral dissolving Zofran, she notices some improvement of nausea and requested to be discharged to home. I discussed with a nurse who will help arrange discharge.      Diagnosis:    ICD-10-CM    1. Nausea  R11.0        Discharge Medications:  Discharge Medication List as of 1/5/2022  9:08 AM      START taking these medications    Details   ondansetron (ZOFRAN ODT) 4 MG ODT tab Take 1 tablet (4 mg) by mouth every 8 hours as needed for nausea, Disp-10 tablet, R-0, Local Print             Scribe Disclosure:  I, Mirza Suarez, am serving as a scribe at 7:18 AM on 1/5/2022 to document services personally performed by Jeff Calderon MD based on my observations and the provider's statements to me.          Jeff Calderon MD  01/05/22 0777

## 2022-01-05 NOTE — DISCHARGE INSTRUCTIONS
Discharge Instructions  Vomiting    You have been seen today for vomiting (throwing up). This is usually caused by a virus, but some bacteria, parasites, medicines or other medical conditions can cause similar symptoms. At this time your provider does not find that your vomiting is a sign of anything dangerous or life-threatening. However, sometimes the signs of serious illness do not show up right away. If you have new or worse symptoms, you may need to be seen again in the Emergency Department or by your primary provider. Remember that serious problems like appendicitis can start as vomiting.    Generally, every Emergency Department visit should have a follow-up clinic visit with either a primary or a specialty clinic/provider. Please follow-up as instructed by your emergency provider today.    Return to the Emergency Department if:  You keep vomiting and you are not able to keep liquids down.   You feel you are getting dehydrated, such as being very thirsty, not urinating (peeing) at least every 8-12 hours, or feeling faint or lightheaded.   You develop a new fever, or your fever continues for more than 2 days.   You have abdominal (belly pain) that seems worse than cramps, is in one spot, or is getting worse over time. Appendicitis usually causes pain in the right lower abdomen (to the right and below your belly button) so watch for pain in this location.  You have blood in your vomit or stools.   You feel very weak.  You are not starting to improve within 24 hours of your visit here.     What can I do to help myself?  The most important thing to do is to drink clear liquids. If you have been vomiting a lot, it is best to have only small, frequent sips of liquids. Drinking too much at once may cause more vomiting. If you are vomiting often, you must replace minerals, sodium and potassium lost with your illness. Pedialyte  is the best available rehydration liquid but some find that it doesn t taste good so sports  drinks are an alterative. You can also drink clear liquids such as water, weak tea, apple juice, and 7-Up . Avoid acid liquids (orange), caffeine (coffee) or alcohol. Do not drink milk until you no longer have diarrhea (loose stools).   After liquids are staying down, you may start eating mild foods. Soda crackers, toast, plain noodles, gelatin, applesauce and bananas are good first choices. Avoid foods that have acid, are spicy, fatty or have a lot of fiber (such as meats, coarse grains, vegetables). You may start eating these foods again in about 3 days when you are better.   Sometimes treatment includes prescription medicine to prevent nausea (sick to your stomach) and vomiting. If your provider prescribes these for you, take them as directed.   Do not take ibuprofen, naproxen, or other nonsteroidal anti-inflammatory (NSAID) medicines without checking with your healthcare provider.     If you were given a prescription for medicine here today, be sure to read all of the information (including the package insert) that comes with your prescription.  This will include important information about the medicine, its side effects, and any warnings that you need to know about.  The pharmacist who fills the prescription can provide more information and answer questions you may have about the medicine.  If you have questions or concerns that the pharmacist cannot address, please call or return to the Emergency Department.     Remember that you can always come back to the Emergency Department if you are not able to see your regular provider in the amount of time listed above, if you get any new symptoms, or if there is anything that worries you.

## 2022-01-05 NOTE — ED NOTES
Pt states that she does not know how to utilize the bus and states that her doctors don't allow her to use it on account of her seizure disorder.  Pt unable to call for cab;  Cab called for pt.

## 2022-01-05 NOTE — ED NOTES
[facility]  Expected date: 1/5/22  Expected time:   Means of arrival:   Comments:  Triage

## 2022-01-05 NOTE — ED TRIAGE NOTES
Patient called ems because she is hungry. She has been drinking. Patient is homeless. Patient did have a 30 second seizure in the EMS rig on the way to the hospital. No meds were given.

## 2022-01-05 NOTE — ED NOTES
Pt had witnessed seizure-like activity;  Pt able to converse immediately after.  MD aware and assessed pt.

## 2022-01-08 ENCOUNTER — HOSPITAL ENCOUNTER (EMERGENCY)
Facility: CLINIC | Age: 52
Discharge: HOME | End: 2022-01-08
Attending: EMERGENCY MEDICINE | Admitting: EMERGENCY MEDICINE
Payer: COMMERCIAL

## 2022-01-08 VITALS
WEIGHT: 135 LBS | HEART RATE: 103 BPM | BODY MASS INDEX: 23.91 KG/M2 | DIASTOLIC BLOOD PRESSURE: 78 MMHG | TEMPERATURE: 98.7 F | OXYGEN SATURATION: 99 % | RESPIRATION RATE: 12 BRPM | SYSTOLIC BLOOD PRESSURE: 134 MMHG

## 2022-01-08 DIAGNOSIS — R11.0 NAUSEA: ICD-10-CM

## 2022-01-08 DIAGNOSIS — G40.909 NONINTRACTABLE EPILEPSY WITHOUT STATUS EPILEPTICUS, UNSPECIFIED EPILEPSY TYPE (H): ICD-10-CM

## 2022-01-08 LAB
ALBUMIN SERPL-MCNC: 4.4 G/DL (ref 3.4–5)
ALP SERPL-CCNC: 55 U/L (ref 40–150)
ALT SERPL W P-5'-P-CCNC: 15 U/L (ref 0–50)
ANION GAP SERPL CALCULATED.3IONS-SCNC: 4 MMOL/L (ref 3–14)
AST SERPL W P-5'-P-CCNC: 13 U/L (ref 0–45)
BASOPHILS # BLD AUTO: 0 10E3/UL (ref 0–0.2)
BASOPHILS NFR BLD AUTO: 1 %
BILIRUB SERPL-MCNC: 0.3 MG/DL (ref 0.2–1.3)
BUN SERPL-MCNC: 8 MG/DL (ref 7–30)
CALCIUM SERPL-MCNC: 8.8 MG/DL (ref 8.5–10.1)
CHLORIDE BLD-SCNC: 105 MMOL/L (ref 94–109)
CO2 SERPL-SCNC: 31 MMOL/L (ref 20–32)
CREAT SERPL-MCNC: 0.81 MG/DL (ref 0.52–1.04)
EOSINOPHIL # BLD AUTO: 0 10E3/UL (ref 0–0.7)
EOSINOPHIL NFR BLD AUTO: 0 %
ERYTHROCYTE [DISTWIDTH] IN BLOOD BY AUTOMATED COUNT: 13.6 % (ref 10–15)
GFR SERPL CREATININE-BSD FRML MDRD: 88 ML/MIN/1.73M2
GLUCOSE BLD-MCNC: 112 MG/DL (ref 70–99)
HCT VFR BLD AUTO: 42 % (ref 35–47)
HGB BLD-MCNC: 13.1 G/DL (ref 11.7–15.7)
IMM GRANULOCYTES # BLD: 0 10E3/UL
IMM GRANULOCYTES NFR BLD: 0 %
LIPASE SERPL-CCNC: 99 U/L (ref 73–393)
LYMPHOCYTES # BLD AUTO: 0.9 10E3/UL (ref 0.8–5.3)
LYMPHOCYTES NFR BLD AUTO: 21 %
MCH RBC QN AUTO: 30 PG (ref 26.5–33)
MCHC RBC AUTO-ENTMCNC: 31.2 G/DL (ref 31.5–36.5)
MCV RBC AUTO: 96 FL (ref 78–100)
MONOCYTES # BLD AUTO: 0.4 10E3/UL (ref 0–1.3)
MONOCYTES NFR BLD AUTO: 9 %
NEUTROPHILS # BLD AUTO: 2.8 10E3/UL (ref 1.6–8.3)
NEUTROPHILS NFR BLD AUTO: 69 %
NRBC # BLD AUTO: 0 10E3/UL
NRBC BLD AUTO-RTO: 0 /100
PLATELET # BLD AUTO: 345 10E3/UL (ref 150–450)
POTASSIUM BLD-SCNC: 3.4 MMOL/L (ref 3.4–5.3)
PROT SERPL-MCNC: 7.7 G/DL (ref 6.8–8.8)
RBC # BLD AUTO: 4.36 10E6/UL (ref 3.8–5.2)
SODIUM SERPL-SCNC: 140 MMOL/L (ref 133–144)
WBC # BLD AUTO: 4.1 10E3/UL (ref 4–11)

## 2022-01-08 PROCEDURE — 80175 DRUG SCREEN QUAN LAMOTRIGINE: CPT | Performed by: EMERGENCY MEDICINE

## 2022-01-08 PROCEDURE — 96361 HYDRATE IV INFUSION ADD-ON: CPT

## 2022-01-08 PROCEDURE — 80177 DRUG SCRN QUAN LEVETIRACETAM: CPT | Performed by: EMERGENCY MEDICINE

## 2022-01-08 PROCEDURE — 36415 COLL VENOUS BLD VENIPUNCTURE: CPT | Performed by: EMERGENCY MEDICINE

## 2022-01-08 PROCEDURE — 80167 DRUG ASSAY FELBAMATE: CPT | Performed by: EMERGENCY MEDICINE

## 2022-01-08 PROCEDURE — 96374 THER/PROPH/DIAG INJ IV PUSH: CPT

## 2022-01-08 PROCEDURE — 83690 ASSAY OF LIPASE: CPT | Performed by: EMERGENCY MEDICINE

## 2022-01-08 PROCEDURE — 99284 EMERGENCY DEPT VISIT MOD MDM: CPT | Mod: 25

## 2022-01-08 PROCEDURE — 80053 COMPREHEN METABOLIC PANEL: CPT | Performed by: EMERGENCY MEDICINE

## 2022-01-08 PROCEDURE — 85025 COMPLETE CBC W/AUTO DIFF WBC: CPT | Performed by: EMERGENCY MEDICINE

## 2022-01-08 PROCEDURE — 250N000011 HC RX IP 250 OP 636: Performed by: EMERGENCY MEDICINE

## 2022-01-08 PROCEDURE — 258N000003 HC RX IP 258 OP 636: Performed by: EMERGENCY MEDICINE

## 2022-01-08 RX ORDER — ONDANSETRON 2 MG/ML
4 INJECTION INTRAMUSCULAR; INTRAVENOUS EVERY 30 MIN PRN
Status: DISCONTINUED | OUTPATIENT
Start: 2022-01-08 | End: 2022-01-08 | Stop reason: HOSPADM

## 2022-01-08 RX ORDER — ONDANSETRON 4 MG/1
4 TABLET, ORALLY DISINTEGRATING ORAL EVERY 8 HOURS PRN
Qty: 10 TABLET | Refills: 0 | Status: SHIPPED | OUTPATIENT
Start: 2022-01-08 | End: 2022-01-11

## 2022-01-08 RX ORDER — SODIUM CHLORIDE 9 MG/ML
INJECTION, SOLUTION INTRAVENOUS CONTINUOUS
Status: DISCONTINUED | OUTPATIENT
Start: 2022-01-08 | End: 2022-01-08 | Stop reason: HOSPADM

## 2022-01-08 RX ADMIN — SODIUM CHLORIDE 1000 ML: 9 INJECTION, SOLUTION INTRAVENOUS at 19:41

## 2022-01-08 RX ADMIN — ONDANSETRON 4 MG: 2 INJECTION INTRAMUSCULAR; INTRAVENOUS at 19:42

## 2022-01-09 NOTE — ED NOTES
Bed: ED11  Expected date: 1/8/22  Expected time: 6:35 PM  Means of arrival: Ambulance  Comments:  [age]f seizure, out of zofran  ETA 1849
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
## 2022-01-09 NOTE — ED NOTES
Patient reports that she cannot get ahold of her medical transportation to get back home due to them being closed. Patient requesting that we call a wheelchair transport to get her home. [facility] via [facility] van.

## 2022-01-10 LAB
LAMOTRIGINE SERPL-MCNC: 21.3 UG/ML
LEVETIRACETAM SERPL-MCNC: <2 UG/ML

## 2022-01-11 LAB — FELBAMATE SERPL-MCNC: 134 UG/ML

## 2022-01-12 ENCOUNTER — TELEPHONE (OUTPATIENT)
Dept: EMERGENCY MEDICINE | Facility: CLINIC | Age: 52
End: 2022-01-12
Payer: COMMERCIAL

## 2022-01-12 NOTE — TELEPHONE ENCOUNTER
[facility] Emergency Department/Urgent Care Lab result notification:    Reason for call  Notify of lab results, assess symptoms,  review ED providers recommendations (if necessary) and advise per ED lab result f/u protocol.    Lab result  Final Lamotrigine (Lamictal) level is 21.3 ug/mL and this level is [ELEVATED]. Normal reference ranged for Lamotrigine (Lamictal) is 2.5 to 15.0 ug/mL  Resulted after [facility] Emergency Dept visit on 1/8/22 (date).  Patient to be notified of result and advised to relay result to their Neurologist or physician who manages the medication dosing.    Final Levetiracetam (Keppra) level is <2 ug/mL and this level is [LOW].    Normal reference range for Levetiracetam (Keppra) is 12.0 to 46.0 ug/mL  Resulted after [facility] () Emergency Dept visit on 1/8/21.  Patient to be notified of result and advised to relay result to their Neurologist or physician who manages the medication dosing.    Final Felbamate (Felbatol) level is 134 ug/mL and this level is [ELEVATED].    Normal reference range for Felbamate (Felbatol) is 30.0 to 60 ug/mL  Resulted after [facility] Emergency Dept visit on 1/8/22 (date).  Patient to be notified of result and advised to relay result to their Neurologist or physician who manages the medication dosing.    1:04PM: Left voicemail message requesting a call back to [phone] between 9 a.m. and 5:30 p.m. for patient's ED/UC lab results.      Rola Arambula RN  [facility] Result RN  [facility] Emergency Dept Lab Result RN  Ph# [phone]

## 2022-01-12 NOTE — RESULT ENCOUNTER NOTE
Final Felbamate (Felbatol) level is 134 ug/mL and this level is [ELEVATED].    Normal reference range for Felbamate (Felbatol) is 30.0 to 60 ug/mL  Resulted after [facility] Emergency Dept visit on 1/8/22 (date).  Patient to be notified of result and advised to relay result to their Neurologist or physician who manages the medication dosing.

## 2022-01-13 NOTE — TELEPHONE ENCOUNTER
[facility] Emergency Department/Urgent Care Lab result notification:     Reason for call  Notify of lab results, assess symptoms,  review ED providers recommendations (if necessary) and advise per ED lab result f/u protocol.     Lab result  Final Lamotrigine (Lamictal) level is 21.3 ug/mL and this level is [ELEVATED]. Normal reference ranged for Lamotrigine (Lamictal) is 2.5 to 15.0 ug/mL  Resulted after [facility] Emergency Dept visit on 1/8/22 (date).  Patient to be notified of result and advised to relay result to their Neurologist or physician who manages the medication dosing.     Final Levetiracetam (Keppra) level is <2 ug/mL and this level is [LOW].    Normal reference range for Levetiracetam (Keppra) is 12.0 to 46.0 ug/mL  Resulted after [facility] () Emergency Dept visit on 1/8/21.  Patient to be notified of result and advised to relay result to their Neurologist or physician who manages the medication dosing.     Final Felbamate (Felbatol) level is 134 ug/mL and this level is [ELEVATED].    Normal reference range for Felbamate (Felbatol) is 30.0 to 60 ug/mL  Resulted after [facility] Emergency Dept visit on 1/8/22 (date).  Patient to be notified of result and advised to relay result to their Neurologist or physician who manages the medication dosing.    RN Assessment (Patient s current Symptoms), include time called.  [Insert Left message here if message left]  Triage not done.     RN Recommendations/Instructions per [place] ED lab result protocol  Patient notified of lab result and treatment recommendations.  I advised Suzi to get these lab results to her prescribing provider and she said she will do so.     Please Contact your PCP clinic or return to the Emergency department if your:    Symptoms return.    Symptoms worsen or other concerning symptom's.    Zari Canseco RN  [facility]  Emergency Dept  Lab Result RN  Ph# [phone]

## 2022-03-13 NOTE — ED NOTES
Patient was able to ambulate in room and asked for a cab, updated the patient that we can check our options.    Yes

## 2022-04-08 ENCOUNTER — APPOINTMENT (OUTPATIENT)
Dept: CT IMAGING | Facility: CLINIC | Age: 52
End: 2022-04-08
Attending: EMERGENCY MEDICINE
Payer: COMMERCIAL

## 2022-04-08 ENCOUNTER — HOSPITAL ENCOUNTER (EMERGENCY)
Facility: CLINIC | Age: 52
Discharge: HOME | End: 2022-04-08
Attending: EMERGENCY MEDICINE | Admitting: EMERGENCY MEDICINE
Payer: COMMERCIAL

## 2022-04-08 VITALS
RESPIRATION RATE: 16 BRPM | HEIGHT: 63 IN | WEIGHT: 140 LBS | SYSTOLIC BLOOD PRESSURE: 114 MMHG | OXYGEN SATURATION: 98 % | BODY MASS INDEX: 24.8 KG/M2 | HEART RATE: 106 BPM | TEMPERATURE: 98 F | DIASTOLIC BLOOD PRESSURE: 81 MMHG

## 2022-04-08 DIAGNOSIS — S09.90XA CLOSED HEAD INJURY, INITIAL ENCOUNTER: ICD-10-CM

## 2022-04-08 DIAGNOSIS — G40.909 RECURRENT SEIZURES (H): ICD-10-CM

## 2022-04-08 DIAGNOSIS — S01.81XA FACIAL LACERATION, INITIAL ENCOUNTER: ICD-10-CM

## 2022-04-08 LAB
HOLD SPECIMEN: NORMAL

## 2022-04-08 PROCEDURE — 90471 IMMUNIZATION ADMIN: CPT | Performed by: EMERGENCY MEDICINE

## 2022-04-08 PROCEDURE — 99284 EMERGENCY DEPT VISIT MOD MDM: CPT | Mod: 25

## 2022-04-08 PROCEDURE — 12011 RPR F/E/E/N/L/M 2.5 CM/<: CPT

## 2022-04-08 PROCEDURE — 250N000011 HC RX IP 250 OP 636: Performed by: EMERGENCY MEDICINE

## 2022-04-08 PROCEDURE — 90715 TDAP VACCINE 7 YRS/> IM: CPT | Performed by: EMERGENCY MEDICINE

## 2022-04-08 PROCEDURE — 70450 CT HEAD/BRAIN W/O DYE: CPT

## 2022-04-08 RX ADMIN — CLOSTRIDIUM TETANI TOXOID ANTIGEN (FORMALDEHYDE INACTIVATED), CORYNEBACTERIUM DIPHTHERIAE TOXOID ANTIGEN (FORMALDEHYDE INACTIVATED), BORDETELLA PERTUSSIS TOXOID ANTIGEN (GLUTARALDEHYDE INACTIVATED), BORDETELLA PERTUSSIS FILAMENTOUS HEMAGGLUTININ ANTIGEN (FORMALDEHYDE INACTIVATED), BORDETELLA PERTUSSIS PERTACTIN ANTIGEN, AND BORDETELLA PERTUSSIS FIMBRIAE 2/3 ANTIGEN 0.5 ML: 5; 2; 2.5; 5; 3; 5 INJECTION, SUSPENSION INTRAMUSCULAR at 20:08

## 2022-04-09 NOTE — ED NOTES
Bed: ED01  Expected date:   Expected time:   Means of arrival:   Comments:  [age]f seizures possible head trauma eta 10

## 2022-04-09 NOTE — ED NOTES
Pt refused discharge vitals. Instructed pt that if there were any abnormalities in CT results, MD would call
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
## 2022-04-09 NOTE — ED TRIAGE NOTES
Pt was walking out of house when she had witnessed seizure. Pt has Hx of seizures and has not missed an doses of seizure medications. Pt was wound to R forehead from fall. No neck pain

## 2022-04-09 NOTE — ED NOTES
"Pt ststing she is leaving as her ride is here. Explained CT results are not back yet, Pt states \"I don't care, I am Leaving. Pt removed own IV. MD aware  "

## 2022-10-04 ENCOUNTER — OFFICE VISIT (OUTPATIENT)
Dept: OBGYN | Facility: CLINIC | Age: 52
End: 2022-10-04
Payer: COMMERCIAL

## 2022-10-04 VITALS
DIASTOLIC BLOOD PRESSURE: 60 MMHG | HEIGHT: 64 IN | WEIGHT: 121 LBS | SYSTOLIC BLOOD PRESSURE: 98 MMHG | BODY MASS INDEX: 20.66 KG/M2

## 2022-10-04 DIAGNOSIS — G40.909 SEIZURE DISORDER (H): ICD-10-CM

## 2022-10-04 DIAGNOSIS — Z12.31 ENCOUNTER FOR SCREENING MAMMOGRAM FOR MALIGNANT NEOPLASM OF BREAST: ICD-10-CM

## 2022-10-04 DIAGNOSIS — Z01.419 WOMEN'S ANNUAL ROUTINE GYNECOLOGICAL EXAMINATION: Primary | ICD-10-CM

## 2022-10-04 DIAGNOSIS — E46 PROTEIN-CALORIE MALNUTRITION, UNSPECIFIED SEVERITY (H): ICD-10-CM

## 2022-10-04 PROCEDURE — 99396 PREV VISIT EST AGE 40-64: CPT | Performed by: STUDENT IN AN ORGANIZED HEALTH CARE EDUCATION/TRAINING PROGRAM

## 2022-10-04 ASSESSMENT — ANXIETY QUESTIONNAIRES
7. FEELING AFRAID AS IF SOMETHING AWFUL MIGHT HAPPEN: NOT AT ALL
1. FEELING NERVOUS, ANXIOUS, OR ON EDGE: NOT AT ALL
GAD7 TOTAL SCORE: 0
5. BEING SO RESTLESS THAT IT IS HARD TO SIT STILL: NOT AT ALL
3. WORRYING TOO MUCH ABOUT DIFFERENT THINGS: NOT AT ALL
2. NOT BEING ABLE TO STOP OR CONTROL WORRYING: NOT AT ALL
6. BECOMING EASILY ANNOYED OR IRRITABLE: NOT AT ALL
GAD7 TOTAL SCORE: 0

## 2022-10-04 ASSESSMENT — PATIENT HEALTH QUESTIONNAIRE - PHQ9
SUM OF ALL RESPONSES TO PHQ QUESTIONS 1-9: 0
5. POOR APPETITE OR OVEREATING: NOT AT ALL
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
## 2022-10-26 ENCOUNTER — HOSPITAL ENCOUNTER (EMERGENCY)
Facility: CLINIC | Age: 52
Discharge: HOME | End: 2022-10-26
Attending: EMERGENCY MEDICINE | Admitting: EMERGENCY MEDICINE
Payer: COMMERCIAL

## 2022-10-26 VITALS
DIASTOLIC BLOOD PRESSURE: 81 MMHG | HEIGHT: 64 IN | WEIGHT: 135 LBS | HEART RATE: 114 BPM | RESPIRATION RATE: 16 BRPM | TEMPERATURE: 98.6 F | BODY MASS INDEX: 23.05 KG/M2 | OXYGEN SATURATION: 98 % | SYSTOLIC BLOOD PRESSURE: 130 MMHG

## 2022-10-26 DIAGNOSIS — G40.909 RECURRENT SEIZURES (H): ICD-10-CM

## 2022-10-26 DIAGNOSIS — F43.0 ACUTE REACTION TO SITUATIONAL STRESS: ICD-10-CM

## 2022-10-26 LAB
ALBUMIN SERPL-MCNC: 4.1 G/DL (ref 3.4–5)
ALP SERPL-CCNC: 49 U/L (ref 40–150)
ALT SERPL W P-5'-P-CCNC: 12 U/L (ref 0–50)
ANION GAP SERPL CALCULATED.3IONS-SCNC: 6 MMOL/L (ref 3–14)
AST SERPL W P-5'-P-CCNC: 12 U/L (ref 0–45)
ATRIAL RATE - MUSE: 105 BPM
BILIRUB SERPL-MCNC: 0.3 MG/DL (ref 0.2–1.3)
BUN SERPL-MCNC: 12 MG/DL (ref 7–30)
CALCIUM SERPL-MCNC: 8.7 MG/DL (ref 8.5–10.1)
CHLORIDE BLD-SCNC: 109 MMOL/L (ref 94–109)
CO2 SERPL-SCNC: 25 MMOL/L (ref 20–32)
CREAT SERPL-MCNC: 0.7 MG/DL (ref 0.52–1.04)
DIASTOLIC BLOOD PRESSURE - MUSE: NORMAL MMHG
GFR SERPL CREATININE-BSD FRML MDRD: >90 ML/MIN/1.73M2
GLUCOSE BLD-MCNC: 100 MG/DL (ref 70–99)
HOLD SPECIMEN: NORMAL
INTERPRETATION ECG - MUSE: NORMAL
LEVETIRACETAM SERPL-MCNC: 37.1 ΜG/ML (ref 10–40)
P AXIS - MUSE: 55 DEGREES
POTASSIUM BLD-SCNC: 3.7 MMOL/L (ref 3.4–5.3)
PR INTERVAL - MUSE: 162 MS
PROT SERPL-MCNC: 7.8 G/DL (ref 6.8–8.8)
QRS DURATION - MUSE: 78 MS
QT - MUSE: 320 MS
QTC - MUSE: 422 MS
R AXIS - MUSE: -30 DEGREES
SODIUM SERPL-SCNC: 140 MMOL/L (ref 133–144)
SYSTOLIC BLOOD PRESSURE - MUSE: NORMAL MMHG
T AXIS - MUSE: 34 DEGREES
VENTRICULAR RATE- MUSE: 105 BPM

## 2022-10-26 PROCEDURE — 99284 EMERGENCY DEPT VISIT MOD MDM: CPT | Mod: 25

## 2022-10-26 PROCEDURE — 258N000003 HC RX IP 258 OP 636: Performed by: EMERGENCY MEDICINE

## 2022-10-26 PROCEDURE — 80053 COMPREHEN METABOLIC PANEL: CPT | Performed by: EMERGENCY MEDICINE

## 2022-10-26 PROCEDURE — 36415 COLL VENOUS BLD VENIPUNCTURE: CPT | Performed by: EMERGENCY MEDICINE

## 2022-10-26 PROCEDURE — 80175 DRUG SCREEN QUAN LAMOTRIGINE: CPT | Performed by: EMERGENCY MEDICINE

## 2022-10-26 PROCEDURE — 80177 DRUG SCRN QUAN LEVETIRACETAM: CPT | Performed by: EMERGENCY MEDICINE

## 2022-10-26 PROCEDURE — 93005 ELECTROCARDIOGRAM TRACING: CPT

## 2022-10-26 PROCEDURE — 80167 DRUG ASSAY FELBAMATE: CPT | Performed by: EMERGENCY MEDICINE

## 2022-10-26 PROCEDURE — 96360 HYDRATION IV INFUSION INIT: CPT

## 2022-10-26 RX ORDER — DIAZEPAM 10 MG/2ML
2.5 INJECTION, SOLUTION INTRAMUSCULAR; INTRAVENOUS ONCE
Status: DISCONTINUED | OUTPATIENT
Start: 2022-10-26 | End: 2022-10-26 | Stop reason: HOSPADM

## 2022-10-26 RX ORDER — DIAZEPAM 5 MG
5 TABLET ORAL ONCE
Status: DISCONTINUED | OUTPATIENT
Start: 2022-10-26 | End: 2022-10-26 | Stop reason: HOSPADM

## 2022-10-26 RX ADMIN — SODIUM CHLORIDE 1000 ML: 9 INJECTION, SOLUTION INTRAVENOUS at 13:53

## 2022-10-26 ASSESSMENT — ENCOUNTER SYMPTOMS
SORE THROAT: 0
NAUSEA: 0
SHORTNESS OF BREATH: 1
SEIZURES: 1
FEVER: 0
DIAPHORESIS: 1
NERVOUS/ANXIOUS: 1
WEAKNESS: 1
PALPITATIONS: 1
RHINORRHEA: 0
NUMBNESS: 1
CHILLS: 0
VOMITING: 0
SLEEP DISTURBANCE: 1
COUGH: 0
TREMORS: 1

## 2022-10-26 ASSESSMENT — ACTIVITIES OF DAILY LIVING (ADL): ADLS_ACUITY_SCORE: 35

## 2022-10-26 NOTE — ED PROVIDER NOTES
ED ATTENDING PHYSICIAN NOTE:   I evaluated this patient in conjunction with Elsi Jimenez PA-C  I have participated in the care of the patient and personally performed key elements of the history, exam, and medical decision making.      HPI:   Suzi Mckoy is a 52 year old female ***       EXAM:   ***     MEDICAL DECISION MAKING/ASSESSMENT AND PLAN:   ***      DIAGNOSIS:   No diagnosis found.         DISPOSITION:   ***       10/26/2022  [facility] EMERGENCY DEPT

## 2022-10-26 NOTE — ED TRIAGE NOTES
Pt arrived per EMS for report of seizures x 5 this afternoon which is not unusual for pt, but boyfriend reports pt was feeling worried after the seizures which is not normal for her so they requested evaluation in the ED. Pt currently A&O.      Triage Assessment     Row Name 10/26/22 1312       Triage Assessment (Adult)    Airway WDL WDL       Respiratory WDL    Respiratory WDL WDL       Skin Circulation/Temperature WDL    Skin Circulation/Temperature WDL WDL       Cardiac WDL    Cardiac WDL WDL       Peripheral/Neurovascular WDL    Peripheral Neurovascular WDL WDL       Cognitive/Neuro/Behavioral WDL    Cognitive/Neuro/Behavioral WDL X  Seizure at home x 5 lasting approx 30sec each. History of stroke with left sided deficits    Level of Consciousness alert    Arousal Level opens eyes spontaneously    Orientation oriented x 4    Speech logical;spontaneous;clear    Mood/Behavior anxious;cooperative;calm       McEwen Coma Scale    Best Eye Response 4-->(E4) spontaneous    Best Motor Response 6-->(M6) obeys commands    Best Verbal Response 5-->(V5) oriented    McEwen Coma Scale Score 15

## 2022-10-26 NOTE — ED PROVIDER NOTES
History   Chief Complaint:  Seizures and Anxiety       The history is provided by the patient and the EMS personnel.      Suzi Mckoy is a 52 year old female with history of epilepsy who presents with seizure activity. Per EMS, the patient began seizing around 1200 and had a series of five seizures, each lasting between 30 seconds to five minutes. Initially, Suzi's significant other wasn't worried as she often has seizures, but she was worried upon waking and felt diaphoretic. En route, EMS note that her blood glucose was 115 and she has been compliant with her medications. During evaluation, Suzi shares her worry as she still has some tremors, as well as some left-sided numbness and weakness, but this is chronic. She also has some issues sleeping at night, but is able to sleep during the day, and this abnormal cycle worries her. She also endorses a feeling of her heart racing, as well as some shortness of breath. She denies any nausea, vomiting, fever, chills, cough, sore throat, rhinorrhea, ear pain, or chest pain. She does not smoke or use recreational drugs, but she does occasionally drink alcoholic beverages, but none recently .    Suzi also mentions that she's been increasingly stressed, as she is worried about her living situation. She also is working on getting a PCA, but has not been able to as of yet.       Review of Systems   Constitutional: Positive for diaphoresis. Negative for chills and fever.   HENT: Negative for ear pain, rhinorrhea and sore throat.    Respiratory: Positive for shortness of breath. Negative for cough.    Cardiovascular: Positive for palpitations. Negative for chest pain.   Gastrointestinal: Negative for nausea and vomiting.   Neurological: Positive for tremors, seizures, weakness (left-sided, chronic) and numbness (left-sided, chronic).   Psychiatric/Behavioral: Positive for sleep disturbance. The patient is nervous/anxious.    All other systems reviewed and are  "negative.        Allergies:  Phenytoin  Generic medications    Medications:  Keppra  Felbatol  Lamictal    Past Medical History:    Bipolar disorder  SI  Epilepsy  Conversion disorder  Hemiplegia and hemiparesis  Depression  Scoliosis  Conversion disorder     Past Surgical History:    Craniotomy x2  Laser wart removal   section     Family History:    Brain cancer - father  Heart disease - father    Social History:  The patient presents to the ED via EMS.  Living Situation: Patient lives with her significant other.  PCP: Angel Fam     Physical Exam     Patient Vitals for the past 24 hrs:   BP Temp Temp src Pulse Resp SpO2 Height Weight   10/26/22 1310 130/81 98.6  F (37  C) Oral 114 16 98 % 1.613 m (5' 3.5\") 61.2 kg (135 lb)       Physical Exam   Vitals: Reviewed, as above.  Notable for tachycardia.  General: Alert and oriented, in mild distress. Resting on bed.  Skin: Warm and well-perfused. No rashes, lesions, or erythema.   HEENT: Head: Normocephalic, atraumatic. Facial features symmetric. Eyes: Conjunctiva pink, sclera white. EOMs grossly intact. Ears: Auricles without lesion, erythema, or edema. Mouth and throat: Lips are moist with no chapping, lesions, or edema, Buccal mucosa is pink and moist without lesions. Oropharyngeal mucosa is pink and moist with no erythema, edema, or exudate.   Neck: Supple with no lymphadenopathy. Full ROM.   Pulmonary: Chest wall expansion symmetric with no increased work of breathing. Lungs clear to auscultation bilaterally.   Cardiovascular: Heart RRR with no murmurs. 2+ radial and tibialis posterior pulses bilaterally. No peripheral edema.  Abdominal: No hernias, scars, lesions, striae, or distension. Bowel sounds present and physiologic. Abdomen is soft and nontender to light and deep palpation in all 4 quadrants with no guarding or rebound. No masses or organomegaly.   Musculoskeletal: Moves all extremities spontaneously.   Neuro: Patient is alert and " oriented to person place time.  Speech fluent with normal cognition.  Cranial nerves II through XII intact:   PERRL, EOMI, symmetric smile, equal eye squeeze and forehead raise, normal sensation in the V1 V2 V3 distribution (chronically decreased on the left side), grossly equal hearing, midline tongue protrusion with normal side to side movement, full strength with head turn, 5/5 strength with right shoulder shrug, 2/5 strength with left shoulder shrug.  RUE strength 5/5: , elbow flexion/extension, wrist flexion/extension  LUE strength 2/5: , elbow flexion/extension, wrist flexion/extension  RLE strength 5/5: Ankle flexion/extension, knee flexion/extension, hip flexion/extension  LLE strength 4/5: Ankle plantarflexion, knee flexion/extension, hip flexion/extension. 3/5: Ankle dorsiflexion   Sensation intact and somewhat decreased on the left side.  Holds left upper extremity in pronation and flexion.  Psych: Affect appropriate.  Answers questions appropriately. Patient appears calm.      Emergency Department Course   ECG  ECG obtained at 1343, ECG read at 1347  Sinus tachycardia  Possible left atrial enlargement  Left axis deviation  Abnormal ECG  Rate 105 bpm. MD interval 162 ms. QRS duration 78 ms. QT/QTc 320/422 ms. P-R-T axes 55 -30 34.    Laboratory:  Labs Ordered and Resulted from Time of ED Arrival to Time of ED Departure   COMPREHENSIVE METABOLIC PANEL - Abnormal       Result Value    Sodium 140      Potassium 3.7      Chloride 109      Carbon Dioxide (CO2) 25      Anion Gap 6      Urea Nitrogen 12      Creatinine 0.70      Calcium 8.7      Glucose 100 (*)     Alkaline Phosphatase 49      AST 12      ALT 12      Protein Total 7.8      Albumin 4.1      Bilirubin Total 0.3      GFR Estimate >90     FELBAMATE LEVEL   KEPPRA (LEVETIRACETAM) LEVEL   LAMOTRIGINE LEVEL          Emergency Department Course:       Reviewed:  I reviewed nursing notes, vitals, past medical history and Care  "Everywhere    Assessments/Consults:  ED Course as of 10/26/22 1435   Wed Oct 26, 2022   1304 I obtained history and examined the patient as noted above.   1339 Dr. Trierweiler examined the patient.   1424 I rechecked the patient and explained findings.   1425 I rechecked the patient. At this time, the patient was deemed safe to return home, and she agreed to the plan of care.       Interventions:  1353 NS 1 L IV    Disposition:  The patient was discharged to home.     Impression & Plan     Medical Decision Making:  Suzi Mckoy is a 52 year old female with history of epilepsy who presents with seizure activity.  Please see HPI and physical exam for full details.  Patient has a reassuring physical exam with only chronic neurologic changes, including a left-sided weakness and decreased sensation.  Patient appears anxious on evaluation.  She does have a mild tachycardia, however chart review reveals that she chronically has an elevated heart rate between 100 and 115 during several previous visits.  Laboratory evaluations unremarkable.  EKG is nonconcerning for ischemia.  She had not had her antiepileptic drug levels drawn since January, and so these were repeated today.  Patient knows to follow up with these as an outpatient with her neurologist.  I did provide her with her neurologist's information again on her discharge information, as she states \"I do not know who my neurologist is.\"  I referred her to Dr. Bolaños at [facility] of neurology, who she last saw in November of last year.  I did offer Valium to the patient, however we only had a generic oral tablet, and the patient declined this, as she states she is allergic to all generic medications.  I did offer her IV Valium, which is a name brand medication, however she also declined this.  Return precautions were provided.  Patient desires to go home.  She was able to tolerate food while in the ED.  She is discharged in stable condition. "       Diagnosis:    ICD-10-CM    1. Recurrent seizures (H)  G40.909       2. Acute reaction to situational stress  F43.0             Scribe Disclosure:  I, Bhavana Bain, am serving as a scribe at 1:03 PM on 10/26/2022 to document services personally performed by Elsi Jimenez PA-C based on my observations and the provider's statements to me.        Elsi Jimenez PA-C  10/26/22 0349

## 2022-10-26 NOTE — ED NOTES
Bed: ED07  Expected date:   Expected time:   Means of arrival:   Comments:  [facility] - 516 - 52 F seizure eta 1249

## 2022-10-27 LAB — LAMOTRIGINE SERPL-MCNC: 16 UG/ML

## 2022-10-29 LAB — FELBAMATE SERPL-MCNC: 43 UG/ML

## 2022-11-30 ENCOUNTER — ANCILLARY PROCEDURE (OUTPATIENT)
Dept: GENERAL RADIOLOGY | Facility: CLINIC | Age: 52
End: 2022-11-30
Attending: FAMILY MEDICINE
Payer: COMMERCIAL

## 2022-11-30 ENCOUNTER — OFFICE VISIT (OUTPATIENT)
Dept: URGENT CARE | Facility: URGENT CARE | Age: 52
End: 2022-11-30
Payer: COMMERCIAL

## 2022-11-30 VITALS
OXYGEN SATURATION: 99 % | SYSTOLIC BLOOD PRESSURE: 118 MMHG | BODY MASS INDEX: 23.54 KG/M2 | DIASTOLIC BLOOD PRESSURE: 73 MMHG | TEMPERATURE: 98.2 F | WEIGHT: 135 LBS | RESPIRATION RATE: 16 BRPM | HEART RATE: 71 BPM

## 2022-11-30 DIAGNOSIS — S29.9XXA CHEST WALL INJURY, INITIAL ENCOUNTER: Primary | ICD-10-CM

## 2022-11-30 DIAGNOSIS — S49.92XA SHOULDER INJURY, LEFT, INITIAL ENCOUNTER: ICD-10-CM

## 2022-11-30 PROCEDURE — 71101 X-RAY EXAM UNILAT RIBS/CHEST: CPT | Mod: TC | Performed by: RADIOLOGY

## 2022-11-30 PROCEDURE — 99213 OFFICE O/P EST LOW 20 MIN: CPT | Performed by: FAMILY MEDICINE

## 2022-11-30 PROCEDURE — 73030 X-RAY EXAM OF SHOULDER: CPT | Mod: TC | Performed by: RADIOLOGY

## 2022-11-30 NOTE — PROGRESS NOTES
SUBJECTIVE:  Chief Complaint   Patient presents with     Urgent Care     Bruise on the left side after she fell two weeks ago    .ident presents with a chief complaint of left shoulder and rib.  The injury occurred weeks ago.   The injury happened while while walking and sz.     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     Social History     Tobacco Use     Smoking status: Former     Types: Cigarettes     Quit date: 1998     Years since quittin.9     Smokeless tobacco: Never   Substance Use Topics     Alcohol use: Yes     Comment: Occasional       ROSINTEGUMENTARY/SKIN: NEGATIVE for open wound/bleeding and NEGATIVE for bruising    EXAM: /73   Pulse 71   Temp 98.2  F (36.8  C)   Resp 16   Wt 61.2 kg (135 lb)   LMP 05/10/2021 (Approximate)   SpO2 99%   BMI 23.54 kg/m  Gen: healthy,alert,no distress  Extremity: shoulder and left rib has pain with palpation and rom.   There is not compromise to the distal circulation.  Pulses are +2 and CRT is brisk.GENERAL APPEARANCE: healthy, alert and no distress  SKIN: no suspicious lesions or rashes  NEURO: Normal strength and tone, sensory exam grossly normal, mentation intact and speech normal    X-RAY no  fx      ICD-10-CM    1. Chest wall injury, initial encounter  S29.9XXA XR Ribs & Chest Left G/E 3 Views     Orthopedic  Referral      2. Shoulder injury, left, initial encounter  S49.92XA XR Shoulder Left G/E 3 Views     Orthopedic  [facility]

## 2022-12-05 ENCOUNTER — TELEPHONE (OUTPATIENT)
Dept: EMERGENCY MEDICINE | Facility: CLINIC | Age: 52
End: 2022-12-05

## 2022-12-05 NOTE — TELEPHONE ENCOUNTER
[facility] () Emergency Department Lab result notification     Patient/parent Name  Patient/    Reason for call  Patient requesting lab result    Lab Result  Final Lamotrigine (Lamictal) level is 16 ug/mL and this level is [ELEVATED]. Normal reference ranged for Lamotrigine (Lamictal) is 3.0 to 15.0 ug/mL  Resulted after [facility] Emergency Dept visit on 10/26/22 (date).  Patient to be notified of result and advised to relay result to their Neurologist or physician who manages the medication dosing.    Current symptoms  1:47 PM - patient and patient care assistance calling regarding letter they received in the mail from ED visit on 10/26/22 - writer notes patient was in the emergency department for Dx recurrent seizures with seizure medication levels drawn showing out of range values - these were discussed with patient and assistant    Recommendations/Instructions  Advised follow up with neurology to discuss lab values, interpretation and recommendation - patient sees provider from [facility] and these results were faxed 12/5/2022 - Lamictal, keppra, Felbamate    [facility]  Provider:  Gretchen Womack MD  Phone: [phone]    Fax: [phone]        Contact your PCP clinic or return to the Emergency department if your:    Symptoms return.    Symptoms worsen or other concerning symptom's.    PCP follow-up Questions asked: YES       Carrillo Medina RN  [facility] [place]  Emergency Dept Lab Result RN  # [phone]     Copy of Lab result   Component      Latest Ref Rng & Units 10/26/2022   Felbamate Level      30 - 60 ug/mL 43   Keppra (Levetiracetam) Level      10.0 - 40.0  g/mL 37.1   Lamotrigine      3.0 - 15.0 ug/mL 16.0 (H)

## 2023-01-09 ENCOUNTER — OFFICE VISIT (OUTPATIENT)
Dept: URGENT CARE | Facility: URGENT CARE | Age: 53
End: 2023-01-09
Payer: COMMERCIAL

## 2023-01-09 VITALS
HEART RATE: 78 BPM | TEMPERATURE: 98.2 F | OXYGEN SATURATION: 98 % | DIASTOLIC BLOOD PRESSURE: 82 MMHG | RESPIRATION RATE: 16 BRPM | SYSTOLIC BLOOD PRESSURE: 123 MMHG

## 2023-01-09 DIAGNOSIS — K04.7 DENTAL INFECTION: Primary | ICD-10-CM

## 2023-01-09 PROCEDURE — 96372 THER/PROPH/DIAG INJ SC/IM: CPT | Performed by: FAMILY MEDICINE

## 2023-01-09 PROCEDURE — 99214 OFFICE O/P EST MOD 30 MIN: CPT | Mod: 25 | Performed by: FAMILY MEDICINE

## 2023-01-09 RX ORDER — HYDROCODONE BITARTRATE AND ACETAMINOPHEN 5; 325 MG/1; MG/1
1 TABLET ORAL EVERY 6 HOURS PRN
Qty: 8 TABLET | Refills: 0 | Status: SHIPPED | OUTPATIENT
Start: 2023-01-09 | End: 2023-01-11

## 2023-01-09 RX ORDER — KETOROLAC TROMETHAMINE 30 MG/ML
60 INJECTION, SOLUTION INTRAMUSCULAR; INTRAVENOUS ONCE
Status: COMPLETED | OUTPATIENT
Start: 2023-01-09 | End: 2023-01-09

## 2023-01-09 RX ADMIN — KETOROLAC TROMETHAMINE 60 MG: 30 INJECTION, SOLUTION INTRAMUSCULAR; INTRAVENOUS at 19:35

## 2023-01-10 NOTE — PROGRESS NOTES
SUBJECTIVE: Suzi Mckoy is a 52 year old female presenting with a chief complaint of left lower dental pain.  Onset of symptoms was day(s) ago.  Course of illness is worsening.    Predisposing factors include dental carries.    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     Social History     Tobacco Use     Smoking status: Former     Types: Cigarettes     Quit date: 1998     Years since quittin.0     Smokeless tobacco: Never   Substance Use Topics     Alcohol use: Yes     Comment: Occasional       ROS:  SKIN: no rash  GI: no vomiting    OBJECTIVE:  /82   Pulse 78   Temp 98.2  F (36.8  C) (Tympanic)   Resp 16   LMP 05/10/2021 (Approximate)   SpO2 98% GENERAL APPEARANCE: healthy, alert and no distress  EYES: EOMI,  PERRL, conjunctiva clear  HENT: oral mucous membranes moist, no erythema noted and dental pain left lower  NECK: supple, nontender, no lymphadenopathy  SKIN: no suspicious lesions or rashes      ICD-10-CM    1. Dental infection  K04.7 amoxicillin-clavulanate (AUGMENTIN) 875-125 MG tablet     HYDROcodone-acetaminophen (NORCO) 5-325 MG tablet     ketorolac (TORADOL) injection 60 mg        F/u Dental  Fluids/Rest, f/u if worse/not any better

## 2023-01-13 ENCOUNTER — NURSE TRIAGE (OUTPATIENT)
Dept: NURSING | Facility: CLINIC | Age: 53
End: 2023-01-13

## 2023-01-14 ENCOUNTER — NURSE TRIAGE (OUTPATIENT)
Dept: NURSING | Facility: CLINIC | Age: 53
End: 2023-01-14

## 2023-01-14 ENCOUNTER — OFFICE VISIT (OUTPATIENT)
Dept: URGENT CARE | Facility: URGENT CARE | Age: 53
End: 2023-01-14
Payer: COMMERCIAL

## 2023-01-14 VITALS — SYSTOLIC BLOOD PRESSURE: 107 MMHG | DIASTOLIC BLOOD PRESSURE: 72 MMHG | TEMPERATURE: 97.9 F | HEART RATE: 83 BPM

## 2023-01-14 DIAGNOSIS — K04.7 DENTAL INFECTION: ICD-10-CM

## 2023-01-14 DIAGNOSIS — K08.89 PAIN, DENTAL: Primary | ICD-10-CM

## 2023-01-14 PROCEDURE — 99214 OFFICE O/P EST MOD 30 MIN: CPT | Mod: 25 | Performed by: FAMILY MEDICINE

## 2023-01-14 PROCEDURE — 96372 THER/PROPH/DIAG INJ SC/IM: CPT | Performed by: FAMILY MEDICINE

## 2023-01-14 RX ORDER — KETOROLAC TROMETHAMINE 30 MG/ML
30 INJECTION, SOLUTION INTRAMUSCULAR; INTRAVENOUS ONCE
Status: COMPLETED | OUTPATIENT
Start: 2023-01-14 | End: 2023-01-14

## 2023-01-14 RX ADMIN — KETOROLAC TROMETHAMINE 30 MG: 30 INJECTION, SOLUTION INTRAMUSCULAR; INTRAVENOUS at 16:10

## 2023-01-14 NOTE — TELEPHONE ENCOUNTER
"Friend calling - verbal consent obtained.    Nurse Triage SBAR    Is this a 2nd Level Triage? NO    Situation: Patient having dental pain and cannot get in to dentist until the end of the month.     Background: Patient called yesterday and was triaged to [facility] [place]. Patient reports pain is worse today. Will re-triage.     Assessment: Denies fever or swelling. Patient reports pain is 20/10.     Protocol Recommended Disposition:   See HCP Within 4 Hours (Or PCP Triage)    Recommendation: Advised [facility] again. Also suggest an emergency dentist. Provided to location and wait time to the preferred location. Patient is agreeable with plan and verbalizes understanding.     Adin Archibald RN on 1/14/2023 at 2:14 PM    Reason for Disposition    [1] SEVERE pain (e.g., excruciating, unable to do any normal activities) AND [2] not improved 2 hours after pain medicine    Additional Information    Negative: Shock suspected (e.g., cold/pale/clammy skin, too weak to stand, low BP, rapid pulse)    Negative: [1] Similar pain previously AND [2] it was from \"heart attack\"    Negative: [1] Similar pain previously AND [2] it was from \"angina\" AND [3] not relieved by nitroglycerin    Negative: Sounds like a life-threatening emergency to the triager    Negative: Tongue is very swollen and tender    Negative: [1] Face is swollen AND [2] fever    Negative: Patient sounds very sick or weak to the triager    Protocols used: TOOTHACHE-A-AH      "

## 2023-01-14 NOTE — TELEPHONE ENCOUNTER
"Seen for tooth pain 1-9-23 and received an antibiotic but is having severe tooth pain again.  She said she is not able to use OTC medications.  Denies fever.  Her dentist appointment is not until the end of the month.    Disposition is to see provider within four hours and she will try to go to .  Writer advised [facility] open until 9p but patient would like to go to [facility].  Advised that they close in 30 minutes and they request check in within an hour of closing.  Patient requested writer call ahead and she would leave now to get there.  Writer advised that she would still need to register and may not be enough time.  Patient said \"I better get going then.\"    Winifred Adan RN  [facility]        Reason for Disposition    [1] SEVERE pain (e.g., excruciating, unable to do any normal activities) AND [2] not improved 2 hours after pain medicine    Additional Information    Negative: Shock suspected (e.g., cold/pale/clammy skin, too weak to stand, low BP, rapid pulse)    Negative: [1] Similar pain previously AND [2] it was from \"heart attack\"    Negative: [1] Similar pain previously AND [2] it was from \"angina\" AND [3] not relieved by nitroglycerin    Negative: Sounds like a life-threatening emergency to the triager    Negative: Tongue is very swollen and tender    Negative: [1] Face is swollen AND [2] fever    Negative: Patient sounds very sick or weak to the triager    Protocols used: TOOTHACHE-A-AH      "

## 2023-01-14 NOTE — PROGRESS NOTES
Chief Complaint   Patient presents with     Urgent Care     Present for dental pain    Suzi was seen today for urgent care.    Diagnoses and all orders for this visit:    Pain, dental  -     ketorolac (TORADOL) injection 30 mg    Dental infection  -     amoxicillin-clavulanate (AUGMENTIN) 875-125 MG tablet; Take 1 tablet by mouth 2 times daily      Discussed with patient to follow-up with dentist for dental evaluation and tooth ache.  Patient was given a shot of Toradol today for the pain  Also advised to continue on the Augmentin to help with the infection   Was given a detailed handout on list of dental places where she can get appointment earlier possibly.      SUBJECTIVE:    Suzi Mckoy is a 52 year old female who presents to the clinic today with a chief complaint of pain in the lower gum area , she is due for checkup in next 1 month time.  Patient was started on antibiotic but has not taken anything yet.  She denies any change in her symptoms.  Has no ear pain or fever or chills.  Associated symptoms include none. The patient's symptoms are exacerbated by no particular triggers  Patient has been using midol   to improve symptoms.    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       Current Outpatient Medications   Medication Sig Dispense Refill     Acetaminophen (MIDOL PO) Take by mouth as needed       amoxicillin-clavulanate (AUGMENTIN) 875-125 MG tablet Take 1 tablet by mouth 2 times daily 20 tablet 0     felbamate (FELBATOL) 600 MG tablet Take 600 mg by mouth 3 times daily 1000, 1600, 2200       ibuprofen (ADVIL/MOTRIN) 200 MG capsule Take 200 mg by mouth as  needed for fever       lamoTRIgine (LAMICTAL) 100 MG tablet Take 200 mg by mouth 2 times daily 1000, 2200       levETIRAcetam (KEPPRA) 500 MG tablet Take 1,000 mg by mouth 3 times daily        levonorgestrel (MIRENA) 20 MCG/24HR IUD 1 each (20 mcg) by Intrauterine route once       benzocaine-menthol (CEPACOL) 15-3.6 MG lozenge Place 1 lozenge inside cheek every 2 hours as needed for moderate pain (Patient not taking: Reported on 2023)         Social History     Tobacco Use     Smoking status: Former     Types: Cigarettes     Quit date: 1998     Years since quittin.0     Smokeless tobacco: Never   Substance Use Topics     Alcohol use: Yes     Comment: Occasional       ROS  10 point ROS of systems including Constitutional, Eyes, Respiratory, Cardiovascular, Gastroenterology, Genitourinary, Integumentary, Muscularskeletal, Psychiatric were all negative except for pertinent positives noted in my HPI           OBJECTIVE:  /72   Pulse 83   Temp 97.9  F (36.6  C) (Tympanic)   LMP 05/10/2021 (Approximate)   GENERAL APPEARANCE: healthy, alert and no distress  EYES: EOMI,  PERRL, conjunctiva clear  HENT: ear canals and TM's normal.  Nose and mouth poor dental hygiene, plaque noted NECK: supple, nontender, no lymphadenopathy  CV: regular rates and rhythm, normal S1 S2, no murmur noted  PSYCH: mentation appears normal      Yenny Mello MD

## 2023-01-17 ENCOUNTER — HOSPITAL ENCOUNTER (EMERGENCY)
Facility: CLINIC | Age: 53
Discharge: HOME | End: 2023-01-17
Attending: PHYSICIAN ASSISTANT | Admitting: PHYSICIAN ASSISTANT
Payer: COMMERCIAL

## 2023-01-17 ENCOUNTER — APPOINTMENT (OUTPATIENT)
Dept: CT IMAGING | Facility: CLINIC | Age: 53
End: 2023-01-17
Attending: EMERGENCY MEDICINE
Payer: COMMERCIAL

## 2023-01-17 VITALS
RESPIRATION RATE: 16 BRPM | TEMPERATURE: 97.7 F | DIASTOLIC BLOOD PRESSURE: 66 MMHG | OXYGEN SATURATION: 97 % | SYSTOLIC BLOOD PRESSURE: 125 MMHG | HEART RATE: 96 BPM

## 2023-01-17 DIAGNOSIS — K08.89 PAIN, DENTAL: ICD-10-CM
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
## 2023-01-17 DIAGNOSIS — G40.909 RECURRENT SEIZURES (H): ICD-10-CM

## 2023-01-17 LAB
ANION GAP SERPL CALCULATED.3IONS-SCNC: 13 MMOL/L (ref 7–15)
ATRIAL RATE - MUSE: 96 BPM
BASOPHILS # BLD AUTO: 0 10E3/UL (ref 0–0.2)
BASOPHILS NFR BLD AUTO: 0 %
BUN SERPL-MCNC: 10.6 MG/DL (ref 6–20)
CALCIUM SERPL-MCNC: 8.7 MG/DL (ref 8.6–10)
CHLORIDE SERPL-SCNC: 98 MMOL/L (ref 98–107)
CREAT SERPL-MCNC: 0.72 MG/DL (ref 0.51–0.95)
DEPRECATED HCO3 PLAS-SCNC: 27 MMOL/L (ref 22–29)
DIASTOLIC BLOOD PRESSURE - MUSE: NORMAL MMHG
EOSINOPHIL # BLD AUTO: 0 10E3/UL (ref 0–0.7)
EOSINOPHIL NFR BLD AUTO: 0 %
ERYTHROCYTE [DISTWIDTH] IN BLOOD BY AUTOMATED COUNT: 13.2 % (ref 10–15)
GFR SERPL CREATININE-BSD FRML MDRD: >90 ML/MIN/1.73M2
GLUCOSE SERPL-MCNC: 99 MG/DL (ref 70–99)
HCT VFR BLD AUTO: 38.5 % (ref 35–47)
HGB BLD-MCNC: 12.4 G/DL (ref 11.7–15.7)
IMM GRANULOCYTES # BLD: 0 10E3/UL
IMM GRANULOCYTES NFR BLD: 0 %
INTERPRETATION ECG - MUSE: NORMAL
LYMPHOCYTES # BLD AUTO: 1.6 10E3/UL (ref 0.8–5.3)
LYMPHOCYTES NFR BLD AUTO: 15 %
MAGNESIUM SERPL-MCNC: 1.6 MG/DL (ref 1.7–2.3)
MCH RBC QN AUTO: 29.7 PG (ref 26.5–33)
MCHC RBC AUTO-ENTMCNC: 32.2 G/DL (ref 31.5–36.5)
MCV RBC AUTO: 92 FL (ref 78–100)
MONOCYTES # BLD AUTO: 1.1 10E3/UL (ref 0–1.3)
MONOCYTES NFR BLD AUTO: 11 %
NEUTROPHILS # BLD AUTO: 7.8 10E3/UL (ref 1.6–8.3)
NEUTROPHILS NFR BLD AUTO: 74 %
NRBC # BLD AUTO: 0 10E3/UL
NRBC BLD AUTO-RTO: 0 /100
P AXIS - MUSE: 66 DEGREES
PLATELET # BLD AUTO: 349 10E3/UL (ref 150–450)
POTASSIUM SERPL-SCNC: 3.6 MMOL/L (ref 3.4–5.3)
PR INTERVAL - MUSE: 156 MS
QRS DURATION - MUSE: 74 MS
QT - MUSE: 350 MS
QTC - MUSE: 442 MS
R AXIS - MUSE: -21 DEGREES
RBC # BLD AUTO: 4.18 10E6/UL (ref 3.8–5.2)
SODIUM SERPL-SCNC: 138 MMOL/L (ref 136–145)
SYSTOLIC BLOOD PRESSURE - MUSE: NORMAL MMHG
T AXIS - MUSE: 61 DEGREES
VENTRICULAR RATE- MUSE: 96 BPM
WBC # BLD AUTO: 10.5 10E3/UL (ref 4–11)

## 2023-01-17 PROCEDURE — 80177 DRUG SCRN QUAN LEVETIRACETAM: CPT | Performed by: EMERGENCY MEDICINE

## 2023-01-17 PROCEDURE — 99285 EMERGENCY DEPT VISIT HI MDM: CPT | Mod: 25

## 2023-01-17 PROCEDURE — 80175 DRUG SCREEN QUAN LAMOTRIGINE: CPT | Performed by: PHYSICIAN ASSISTANT

## 2023-01-17 PROCEDURE — 80048 BASIC METABOLIC PNL TOTAL CA: CPT | Performed by: EMERGENCY MEDICINE

## 2023-01-17 PROCEDURE — 80167 DRUG ASSAY FELBAMATE: CPT | Performed by: EMERGENCY MEDICINE

## 2023-01-17 PROCEDURE — 83735 ASSAY OF MAGNESIUM: CPT | Performed by: EMERGENCY MEDICINE

## 2023-01-17 PROCEDURE — 36415 COLL VENOUS BLD VENIPUNCTURE: CPT | Performed by: EMERGENCY MEDICINE

## 2023-01-17 PROCEDURE — 93005 ELECTROCARDIOGRAM TRACING: CPT

## 2023-01-17 PROCEDURE — 70450 CT HEAD/BRAIN W/O DYE: CPT

## 2023-01-17 PROCEDURE — 85025 COMPLETE CBC W/AUTO DIFF WBC: CPT | Performed by: EMERGENCY MEDICINE

## 2023-01-17 RX ORDER — PENICILLIN V POTASSIUM 500 MG/1
500 TABLET, FILM COATED ORAL 2 TIMES DAILY
Qty: 20 TABLET | Refills: 0 | Status: SHIPPED | OUTPATIENT
Start: 2023-01-17 | End: 2023-01-27

## 2023-01-17 ASSESSMENT — ENCOUNTER SYMPTOMS
APPETITE CHANGE: 1
SEIZURES: 1
SLEEP DISTURBANCE: 1

## 2023-01-17 ASSESSMENT — ACTIVITIES OF DAILY LIVING (ADL): ADLS_ACUITY_SCORE: 35

## 2023-01-17 NOTE — ED TRIAGE NOTES
Patient states she had a seizure yesterday and twice today. Pt states she doesn't have a place to live and she's stressed out about that. States she has been on her medications daily.

## 2023-01-17 NOTE — ED NOTES
Rapid Assessment Note    History:   Suzi Mckoy is a 52 year old female who presents with concern for seizure.  The patient gives verbal intervals, but states that yesterday and another seizure either yesterday or the day before.  She has a history of epilepsy and is taking Felbatol, Lamictal, Keppra, and has not asked any doses.  She states she has recently been having difficulty sleeping and has multiple life stressors which may have lowered her seizure threshold.  She states she needs an EEG today.  She has not had one in several years.  She also states that she only takes brand-name antiepileptic drugs since the generics do not work for her.    Exam:   General:  Alert, interactive  Cardiovascular:  Well perfused  Lungs:  No respiratory distress, no accessory muscle use  Neuro:  Moving all 4 extremities, PERRL  Skin:  Warm, dry  Psych:  Normal affect      Plan of Care:   I evaluated the patient and developed an initial plan of care. I discussed this plan and explained that I, or one of my partners, would be returning to complete the evaluation.     1/17/2023  [facility]       Chris Weiner MD  01/17/23 2711

## 2023-01-18 LAB — LEVETIRACETAM SERPL-MCNC: 67.5 ΜG/ML (ref 10–40)

## 2023-01-18 NOTE — DISCHARGE INSTRUCTIONS
Take penicillin to help with dental infection. You need to follow up very closely with a dentist regarding your dental infection.    You need to be seen by neurology if you are having recurrent seizures despite your medications.    [facility]  [email or web address]   [address]   Directions   [phone]    [facility]  [email or web address]  Emergency Dental Clinic You Can Rely On. Open 24 Hours. Call Now.  [address], [place]   Directions   [phone]    [facility]  Address: [address], [address] [address]   Phone: [phone]    [facility]  [address] [place] [address] [phone]  E-mail: [email or web address]  Website      [facility]  ([facility])  [address]  [place], [place]  [id number] [phone]  Website    [facility]  [address] [place], [place] [address] [phone]  Website    [facility]  [address]  [place] [address]  Main: [phone]  Appointments: [phone]  Website    [facility] (Dental cleaning,  deep cleaning - periodontal therapy  and x-rays)  [address] [place] [address]  [phone]    [facility]  [address] [place], [place]  [id number] [phone]  Website    [facility] (Sliding fee scale dental services)  [address] [place] [address]  [phone]    [facility] (Dental cleaning,  deep cleaning- periodontal therapy  and x-rays)  [address] [place], [place] [id number] [phone]    [facility] (Multiple locations -- call for details)  [address] [place] [address]  [phone]  Website    [facility]  [address] [place] [address]  [phone]  Website    [facility] [address] [place] [address]  [phone]  Website    [facility] [place]  [address]  [place] [address]  [phone]  Website    [facility]  [address] [place] [address]  [phone]  Website    [facility]  [address]  [place] [address]  [phone]  Website    [facility]  [address] [place] [address]  [phone]  Website    [facility]AP  Dr. Jefferson Degroot  [facility]  [address] [place] [address]  [phone]  Website    [facility]  [address] [place] [address]  [phone]  Website    [facility]  [address] [place] [address]  [phone]  Website    [facility] (Dental cleaning,  deep cleaning- periodontal therapy  and x-rays)  [address] [place] [address]  [phone]    [facility]  [address] [place] [address]  [phone]  Website    [facility] (open to everyone)  [address] [place] [address]  [phone]  Website    [facility] (Sliding fee scale dental services and some state medical assistance programs accepted)  [address] [place] [address]  [phone]         [facility]  [address]  [place] [address]  [phone]  Website    [facility]  [address] [place], [place] [address] [phone]  E-mail: [email or web address]  Website     [facility] (Sliding fee scale dental services for all)  [address] [place] [address]  [phone]  Website    [facility] (Dental cleaning,  deep cleaning- periodontal therapy  and x-rays).  [address] [place] [address]  [phone]  Website    [facility]  [address] [place] [address]  [phone]  Website    [facility]  [address] [place]  [address]  [phone]  Website    [facility]  [address] [place] [address]  [phone]  Website    [facility]  [address] [place] [address]  [phone]  Website    [facility]  Dental clinic for children with special needs. Accepts MA (must have diagnosed medical condition, i.e.:  autism, CP, chronic disease or condition)  [address] [facility] [address]  [place], [place]  [address] [phone]  Website    [facility]  [address] [place] [address]  [phone]  Website    [facility]  [address] [place] [address]  [phone]  Website    [facility]  [address] [place], [place]  [address] [phone]  Website    [facility]  Discount plan available.  Call for details.  [address] [place], [place] [address] [phone]    [facility] (Dental services provided by mobile dental unit)  [address] [facility]ENDI[facility]A [facility]  [address] [address]  [phone]    [facility] (Dr. Steven Holliday - specific criteria and medical necessity required)  [facility], 2nd Floor, [address]  [address] [place] [address]  [phone]  Website    [facility]  [address] [place] [address] [facility]  [phone] (main clinic)  Website  After Hours: Adult emergencies [phone] Pediatric emergencies [phone]    [facility]  [address] [place] [address]  [phone]  Website    [facility]  [address] [place]  [address]  [phone]  Website    Dental Clinics Accepting MA Clients    [facility]    Child and Teen Checkups  [facility]  [phone]    [facility]  [address]  [place], [place]  [phone]    [facility]  [address] [place], [place]  [phone]    [facility]  [address] [facility]  [phone]    [facility]  [address] [facility]  [phone]    [facility]  [address] [facility]  [phone]    [facility]  [address] [facility]  [phone]    [facility]  [address]  [phone]    [facility]  [place]  [phone]    [address]  [phone]    [facility]  [address] [facility]  [phone]    [facility]  [address] [place] [address]  [phone]    [facility]  [address] [facility]  [phone]    [facility]  [address] [facility]  [phone]    ADDITIONAL RESOURCES    [facility]  [phone]    [facility]  [phone]    [facility]  [phone]    This web site contains many locations and information about what services they provide:    [email or web address]

## 2023-01-18 NOTE — ED PROVIDER NOTES
History     Chief Complaint:  Seizures     The history is provided by the patient.      Suzi Mckoy is a 52 year old female with a history of conversion disorder with seizures or convulsions, epilepsy s/p craniotomy x2, stroke who presents after a fall which occurred earlier today as she was having a seizure. She reports that throughout her life, she has experienced frequent seizures, including breakthrough seizures even when taking her medications as prescribed. She states that she had been having less frequent seizures until approximately one month ago when they became frequent again despite reportedly taking all of her medications as prescribed. Today, she states that she was sitting on a bed when she had a seizure. This cause her to fall off of the bed and lightly hit her head on a piece of furniture. She reports that this caused her friend to become concerned and therefore encouraged her to present to the ED today. Presently, Suzi is requesting EEG monitoring. She also notes that she has been under quite a bit of stress recently which has caused disruption to her sleep and PO intake. She reports that she follows up with neurology every 6 months, and per chart review, she most recently saw neurology on 12/16/2022.    Independent Historian: N/A     Review of External Notes:   Neurology visit on 12/16/22 with Dr. Bolaños:     HPI: Suzi Mckoy is a 52 y.o. female who is seen in follow up for seizure disorder. As in the past the patient comes accompanied by her ex-boyfriend Carlito who is still the primary caregiver. Carlito states that the patient continue to have seizures and last 1 was in the summer when they were in [place]. The patient fell on the left side of her body was all bruised. The patient states that she continues to have spells which are also associated with stressors especially if she talks to her daughter or mother. The patient continues to complain of being estranged from her  daughter who has 3 children. The patient states that she sees the grandchildren on the Facebook But would have like to see them in person.    The patient continues to complain of memory loss. Also she has had an unsteady balance and her left foot is turning in when walking. The patient has order for ankle orthosis however she states that the 1 she has not very good. She cannot put them in the shoes. In the past we discussed about the neuropsychometric testing but the patient does not want to have the testing.    The patient continues to take phenobarbital 600mg, 3 times a day. She is also on Keppra 500 mg, 2 po 3 times a day, also lamotrigine 100mg, 2 in the morning, 1 at noon and 2 at bedtime.     The patient has had blood work-up in October lamotrigine level 16, felbamate level was 43, LFTs were normal. CBC was not done. Electrolytes were normal.        ASSESSMENT/PLAN  The patient is a 52-year-old lady with traumatic brain injury left hemiplegia, seizure disorder as well as nonepileptic events. She also has memory loss and unsteady gait mainly due to mild hemiparesis and spasticity.    From the seizure point of view we will continue with present medication. I am not certain if all the spells the patient described represents seizures. Certain in the event in [place] when the patient felt badly injured herself very likely might have represented breakthrough seizure.    The patient will benefit from physical therapy for her gait as well as ankle orthosis on the left. The patient does not want to go to physical therapy and she will try to get the ankle-foot orthoses read.    We also discussed about memory loss and I recommended again to do neuropsychometric testing which will have been very helpful in assessing cognitive function. The patient does not want to have this test now.    The patient will also have a blood work-up to exclude other causes for memory loss and unsteady gait. She will follow-up in a year  or earlier should the need arise.    1. Long-term use of high-risk medication   2. Hemiplegia and hemiparesis following cerebral infarction affecting left non-dominant side (HCC)   3. Seizure disorder (HCC)   4. Conversion disorder with seizures or convulsions   5. Memory loss or impairment     ROS:  Review of Systems   Constitutional: Positive for appetite change.   Neurological: Positive for seizures.   Psychiatric/Behavioral: Positive for sleep disturbance.   All other systems reviewed and are negative.    Allergies:  Phenytoin  Levetiracetam  Lamotrigine  Felbamate     Medications:    Felbatol  Lamictal  Keppra  Mirena IUD    Past Medical History:    Anxiety  Bipolar I disorder, mixed  Scoliosis   Stroke   Depression  Epilepsy   Memory loss  Suicidal behavior  Breakthrough seizure  Scoliosis  Personality disorder  Conversion disorder with seizures or convulsions  Hemiplegia and hemiparesis following cerebral infarction affecting left non-dominant side   Protein-calorie malnutrition     Past Surgical History:    Craniotomy x2   section   Laser vulvar wart removal     Family History:    Father: heart disease, brain cancer, arthritis     Social History:  The patient presents to the ED alone.  The patient presents to the ED via private car.   PCP: Angel Fam     Physical Exam     Patient Vitals for the past 24 hrs:   BP Temp Temp src Pulse Resp SpO2   23 1555 125/66 97.7  F (36.5  C) Temporal 96 16 97 %      Physical Exam  Constitutional: Pleasant. Cooperative.   Eyes: Pupils equally round and reactive  HENT: No scalp hematoma. No scalp tenderness. No bony step-off or crepitus. No facial bone tenderness or instability. No periorbital ecchymosis or Diana signs. Oropharynx is normal with moist mucus membranes. No evidence for intraoral injury.  Cardiovascular: Regular rate and rhythm and without murmurs.  Respiratory: Normal respiratory effort, lungs are clear bilaterally.  GI: Abdomen is  soft, non-tender, non-distended. No guarding, rebound, or rigidity.  Musculoskeletal: No midline cervical, thoracic, or lumbar tenderness. Normal painless ROM of the neck. No clavicular tenderness. No upper extremity tenderness. No lower extremity tenderness. Decreased ROM of left upper extremity due to previous CVA. No tenderness with compression of the rib cage or pelvis.  Skin: No rashes. No lacerations or abrasions noted.  Neurologic: Cranial nerves II-XII intact, normal cognition, no focal deficits. Alert and oriented x 3. Normal  strength. Normal leg raise. Sensation to light touch intact throughout all 4 extremities. 5/5 strength with dorsiflexion and plantarflexion bilaterally. GCS 15  Psychiatric: Normal affect.  Nursing notes and vital signs reviewed.      Emergency Department Course     ECG  ECG taken at 1605, ECG read at 1613  Normal sinus rhythm  Normal ECG  Rate 96 bpm. WV interval 156 ms. QRS duration 74 ms. QT/QTc 350/442 ms. P-R-T axes 66 -21 61.     Imaging:    CT Head w/o Contrast  1.  No acute intracranial process.  Report per radiology    Laboratory:  Labs Ordered and Resulted from Time of ED Arrival to Time of ED Departure   MAGNESIUM - Abnormal       Result Value    Magnesium 1.6 (*)    BASIC METABOLIC PANEL - Normal    Sodium 138      Potassium 3.6      Chloride 98      Carbon Dioxide (CO2) 27      Anion Gap 13      Urea Nitrogen 10.6      Creatinine 0.72      Calcium 8.7      Glucose 99      GFR Estimate >90     CBC WITH PLATELETS AND DIFFERENTIAL    WBC Count 10.5      RBC Count 4.18      Hemoglobin 12.4      Hematocrit 38.5      MCV 92      MCH 29.7      MCHC 32.2      RDW 13.2      Platelet Count 349      % Neutrophils 74      % Lymphocytes 15      % Monocytes 11      % Eosinophils 0      % Basophils 0      % Immature Granulocytes 0      NRBCs per 100 WBC 0      Absolute Neutrophils 7.8      Absolute Lymphocytes 1.6      Absolute Monocytes 1.1      Absolute Eosinophils 0.0       Absolute Basophils 0.0      Absolute Immature Granulocytes 0.0      Absolute NRBCs 0.0     KEPPRA (LEVETIRACETAM) LEVEL   FELBAMATE LEVEL   LAMOTRIGINE LEVEL      Emergency Department Course & Assessments:  2048 I obtained history and performed an examination of the patient.  2141 I rechecked the patient and explained findings and plan of care. She declines Toradol.          Disposition:  The patient was discharged to home.     Impression & Plan      Medical Decision Making:  Suzi Mckoy is a 52 year old female with a history of conversion disorder with seizures, epilepsy s/p craniotomy x2, CVA, who presents with concern for recurrent seizures. She did have a fall today, however it is unclear if she hit her head. Here, she is requesting EEG and admission. See HPI as above for additional details. Vitals and physical exam as above. CT of head is negative. Electrolytes WNL. Seizure medication levels obtained and are pending at this time. Patient states she is taking her medications as prescribed. She cannot recall when she last saw her neurologist, but chart review reveals it was about one month ago and that she had similar complaints at that time and it was felt at that time that her spells were less likely to represent seizures. No seizure like activity noted here. Patient is able to ambulate. Discussed that EEG was not indicated from an emergent perspective and she could discuss this with her neurologist. No indication for any other imaging at this time based upon full exam. Patient requests pain medication for dental concerns. Will provide her Rx for Penicillin, discussed that I would not be prescribing narcotic pain medication for this at this time. She is to continue to take her medications as prescribed and f/u with neuro with any concerns. Discussed reasons to return. All questions answered. Patient discharged to home in stable condition.    Diagnosis:    ICD-10-CM    1. Recurrent seizures (H)  G40.909        2. Fall, initial encounter  W19.XXXA       3. Pain, dental  K08.89          Discharge Medications:  New Prescriptions    PENICILLIN V (VEETID) 500 MG TABLET    Take 1 tablet (500 mg) by mouth 2 times daily for 10 days      Scribe Disclosure:  I, Barb Monroe, am serving as a scribe at 8:48 PM on 1/17/2023 to document services personally performed by Fritz Payne PA-C based on my observations and the provider's statements to me.     1/17/2023   Fritz Payne PA-C     This record was created at least in part using electronic voice recognition software, so please excuse any typographical errors.       Fritz Payne PA-C  01/17/23 [id number]

## 2023-01-19 LAB — LAMOTRIGINE SERPL-MCNC: 16.3 UG/ML

## 2023-01-22 ENCOUNTER — TELEPHONE (OUTPATIENT)
Dept: EMERGENCY MEDICINE | Facility: CLINIC | Age: 53
End: 2023-01-22
Payer: COMMERCIAL

## 2023-01-22 NOTE — TELEPHONE ENCOUNTER
[facility] Emergency Department/Urgent Care Lab result notification:    [place] ED lab result protocol used  Misc    Reason for call  Notify of lab results, assess symptoms,  review ED providers recommendations/discharge instructions (if necessary) and advise per ED lab result f/u protocol    Lab Result   Final Lamotrigine (Lamictal) level is 16.3 ug/mL and this level is [ELEVATED]. Normal reference ranged for Lamotrigine (Lamictal) is 3.0 to 15.0 ug/mL  Resulted after [facility] Emergency Dept visit on 1/17/23 (date).  Patient notified of result and advised to relay result to their Neurologist.     Final Levetiracetam (Keppra) level is  67.5  ug/mL and this level is [ELEVATED].    Normal reference range for Levetiracetam (Keppra) is 10.0 to 40.0 ug/mL  Resulted after [facility] Emergency Dept visit on 1/17/23 (date).  Patient notified of result and advised to relay result to their Neurologist.   Information table from Emergency Dept Provider visit on 1/17/23  Miscellaneous information Diagnosis:      ICD-10-CM     1. Recurrent seizures (H)  G40.909         2. Fall, initial encounter  W19.XXXA         3. Pain, dental  K08.89           RN Assessment (Patient s current Symptoms), include time called.  [Insert Left message here if message left]  5:02PM: Spoke with patient.   RN Recommendations/Instructions per [place] ED lab result protocol  Patient notified of lab results and advised to give the results to her neurologist.  The patient states she does not want to write the results down. Would like them faxed to her neurologist.   Advised that her Felbamate level is still in process. She would like this faxed to her neurologist when it is final if abnormal, no need to call the patient with the result.   Lamotrigine and Keppra levels faxed to patient's neurologist.  Gretchen Bolaños MD  / [facility]  Fax [phone]   The patient  was advised to call her neurologist to discuss any possible changes to her medication dosing.  The patient is comfortable with the information given and has no further questions.     Please Contact your PCP clinic or return to the Emergency department if your:    Symptoms return.    Symptoms worsen or other concerning symptom's.    PCP follow-up Questions asked: YES       Rola Arambula RN  [facility]  Emergency Dept Lab Result RN  # [phone]     Copy of Lab result   Lamotrigine Level  Order: [id number]   Status: Final result      Visible to patient: No (inaccessible in MyChart)      1 Result Note    Component Ref Range & Units 5 d ago 2 mo ago 1 yr ago    Lamotrigine 3.0 - 15.0 ug/mL 16.3 High   16.0 High  CM  21.3 High  R, CM    Comment: INTERPRETIVE INFORMATION:  Lamotrigine     Therapeutic Range:  3.0-15.0 ug/mL               Toxic:  Greater than or equal to 20 ug/mL     Pharmacokinetics varies widely, particularly with   co-medications and/or compromised renal function.  Adverse   effects may include dizziness, somnolence, nausea and   vomiting.   Performed By: [employer]   [address] [place] [id number]   : Trever Damon MD, PhD   Resulting Agency  [facility]              Specimen Collected: 01/17/23  4:03 PM Last Resulted: 01/19/23 10:26 AM           abnormal data Keppra (Levetiracetam) Level  Order: [id number]   Status: Final result      Visible to patient: No (inaccessible in [facility])      1 Result Note    Component Ref Range & Units 5 d ago 2 mo ago    Keppra (Levetiracetam) Level 10.0 - 40.0  g/mL 67.5 High   37.1    Resulting Agency  [facility]              Specimen Collected: 01/17/23  4:03 PM Last Resulted: 01/18/23  3:19 AM

## 2023-02-03 LAB — FELBAMATE SERPL-MCNC: 56 UG/ML

## 2024-04-26 NOTE — TELEPHONE ENCOUNTER
Ms Matias returned our call.  verified.     Patient notified pathology results from right breast biopsy performed on 7/3/2020 revealed: Fibroadenoma.    Patient notified pathology results from left breast biopsy performed on 7/3/2020 revealed: Benign Fibrofatty breast tissue.     Per radiologist, Dr. Naresh Garcia, results are concordant with imaging findings.     Recommendation: Annual Mammography     Ms Matias verbalized understanding. No issues or concerns with biopsy sites. She knows to call her provider with any breast questions or concerns.     Patient Name: KAITLYNN MATIAS   MR#: [id number]   Specimen #: [id number]   Collected: 7/3/2020   Received: 2020   Reported: 2020 17:19   Ordering Phy(s): LASHON IZQUIERDO     For improved result formatting, select 'View Enhanced Report Format' under    Linked Documents section.     SPECIMEN(S):   A: Breast needle biopsy, right   B: Breast needle biopsy, left     FINAL DIAGNOSIS:   A.  Right breast, 13 mm solid lesion, 4:00, 2 cm from nipple, ultrasound   guided 14 gauge needle core biopsies:   - Fibroadenoma.   - Negative for atypia and malignancy.     B.  Left breast, 16 mm solid lesion, 9:00, ultrasound guided 14 gauge   needle core biopsies:   - Benign fibrofatty breast tissue with a minute focus of fibroadenomatous   change.     COMMENT:   The biopsy specimen B may not be representative of the entire 16 mm solid   breast lesion.     Intradepartmental consultation concurs with the diagnosis.     Electronically signed out by:     Chelsey Bailey M.D.        
Assistance OOB with selected safe patient handling equipment if applicable/Assistance with ambulation/Communicate fall risk and risk factors to all staff, patient, and family/Provide visual cue: yellow wristband, yellow gown, etc/Reinforce activity limits and safety measures with patient and family/Call bell, personal items and telephone in reach/Instruct patient to call for assistance before getting out of bed/chair/stretcher/Non-slip footwear applied when patient is off stretcher/[place] to call system/Physically safe environment - no spills, clutter or unnecessary equipment/Purposeful Proactive Rounding/Room/bathroom lighting operational, light cord in reach

## 2024-07-01 ENCOUNTER — APPOINTMENT (OUTPATIENT)
Dept: GENERAL RADIOLOGY | Facility: CLINIC | Age: 54
End: 2024-07-01
Attending: EMERGENCY MEDICINE
Payer: COMMERCIAL

## 2024-07-01 ENCOUNTER — HOSPITAL ENCOUNTER (EMERGENCY)
Facility: CLINIC | Age: 54
Discharge: HOME | End: 2024-07-01
Attending: EMERGENCY MEDICINE | Admitting: EMERGENCY MEDICINE
Payer: COMMERCIAL

## 2024-07-01 ENCOUNTER — APPOINTMENT (OUTPATIENT)
Dept: CT IMAGING | Facility: CLINIC | Age: 54
End: 2024-07-01
Attending: EMERGENCY MEDICINE
Payer: COMMERCIAL

## 2024-07-01 VITALS
SYSTOLIC BLOOD PRESSURE: 115 MMHG | RESPIRATION RATE: 16 BRPM | TEMPERATURE: 97.8 F | HEART RATE: 78 BPM | OXYGEN SATURATION: 99 % | DIASTOLIC BLOOD PRESSURE: 62 MMHG

## 2024-07-01 DIAGNOSIS — R56.9 SEIZURE (H): ICD-10-CM

## 2024-07-01 DIAGNOSIS — S09.90XA CLOSED HEAD INJURY, INITIAL ENCOUNTER: ICD-10-CM

## 2024-07-01 DIAGNOSIS — S60.222A CONTUSION OF LEFT HAND, INITIAL ENCOUNTER: ICD-10-CM

## 2024-07-01 DIAGNOSIS — S40.012A CONTUSION OF LEFT SHOULDER, INITIAL ENCOUNTER: ICD-10-CM

## 2024-07-01 DIAGNOSIS — S60.519A ABRASION OF HAND, UNSPECIFIED LATERALITY, INITIAL ENCOUNTER: ICD-10-CM

## 2024-07-01 LAB
ALBUMIN SERPL BCG-MCNC: 4.9 G/DL (ref 3.5–5.2)
ALP SERPL-CCNC: 66 U/L (ref 40–150)
ALT SERPL W P-5'-P-CCNC: 8 U/L (ref 0–50)
ANION GAP SERPL CALCULATED.3IONS-SCNC: 10 MMOL/L (ref 7–15)
AST SERPL W P-5'-P-CCNC: 24 U/L (ref 0–45)
ATRIAL RATE - MUSE: 86 BPM
BASOPHILS # BLD AUTO: 0 10E3/UL (ref 0–0.2)
BASOPHILS NFR BLD AUTO: 1 %
BILIRUB SERPL-MCNC: 0.3 MG/DL
BUN SERPL-MCNC: 12.3 MG/DL (ref 6–20)
CALCIUM SERPL-MCNC: 9.6 MG/DL (ref 8.6–10)
CHLORIDE SERPL-SCNC: 101 MMOL/L (ref 98–107)
CREAT SERPL-MCNC: 0.86 MG/DL (ref 0.51–0.95)
DEPRECATED HCO3 PLAS-SCNC: 28 MMOL/L (ref 22–29)
DIASTOLIC BLOOD PRESSURE - MUSE: NORMAL MMHG
EGFRCR SERPLBLD CKD-EPI 2021: 81 ML/MIN/1.73M2
EOSINOPHIL # BLD AUTO: 0.1 10E3/UL (ref 0–0.7)
EOSINOPHIL NFR BLD AUTO: 1 %
ERYTHROCYTE [DISTWIDTH] IN BLOOD BY AUTOMATED COUNT: 13.2 % (ref 10–15)
GLUCOSE SERPL-MCNC: 98 MG/DL (ref 70–99)
HCT VFR BLD AUTO: 41.2 % (ref 35–47)
HGB BLD-MCNC: 13.6 G/DL (ref 11.7–15.7)
HOLD SPECIMEN: NORMAL
HOLD SPECIMEN: NORMAL
IMM GRANULOCYTES # BLD: 0 10E3/UL
IMM GRANULOCYTES NFR BLD: 0 %
INTERPRETATION ECG - MUSE: NORMAL
LYMPHOCYTES # BLD AUTO: 1.7 10E3/UL (ref 0.8–5.3)
LYMPHOCYTES NFR BLD AUTO: 30 %
MCH RBC QN AUTO: 31.4 PG (ref 26.5–33)
MCHC RBC AUTO-ENTMCNC: 33 G/DL (ref 31.5–36.5)
MCV RBC AUTO: 95 FL (ref 78–100)
MONOCYTES # BLD AUTO: 0.4 10E3/UL (ref 0–1.3)
MONOCYTES NFR BLD AUTO: 6 %
NEUTROPHILS # BLD AUTO: 3.7 10E3/UL (ref 1.6–8.3)
NEUTROPHILS NFR BLD AUTO: 63 %
NRBC # BLD AUTO: 0 10E3/UL
NRBC BLD AUTO-RTO: 0 /100
P AXIS - MUSE: 74 DEGREES
PLATELET # BLD AUTO: 302 10E3/UL (ref 150–450)
POTASSIUM SERPL-SCNC: 4.5 MMOL/L (ref 3.4–5.3)
PR INTERVAL - MUSE: 156 MS
PROT SERPL-MCNC: 7.7 G/DL (ref 6.4–8.3)
QRS DURATION - MUSE: 76 MS
QT - MUSE: 348 MS
QTC - MUSE: 416 MS
R AXIS - MUSE: -22 DEGREES
RBC # BLD AUTO: 4.33 10E6/UL (ref 3.8–5.2)
SODIUM SERPL-SCNC: 139 MMOL/L (ref 135–145)
SYSTOLIC BLOOD PRESSURE - MUSE: NORMAL MMHG
T AXIS - MUSE: 47 DEGREES
VENTRICULAR RATE- MUSE: 86 BPM
WBC # BLD AUTO: 5.9 10E3/UL (ref 4–11)

## 2024-07-01 PROCEDURE — 99285 EMERGENCY DEPT VISIT HI MDM: CPT | Mod: 25

## 2024-07-01 PROCEDURE — 85025 COMPLETE CBC W/AUTO DIFF WBC: CPT | Performed by: EMERGENCY MEDICINE

## 2024-07-01 PROCEDURE — 70450 CT HEAD/BRAIN W/O DYE: CPT

## 2024-07-01 PROCEDURE — 72125 CT NECK SPINE W/O DYE: CPT

## 2024-07-01 PROCEDURE — 82040 ASSAY OF SERUM ALBUMIN: CPT | Performed by: EMERGENCY MEDICINE

## 2024-07-01 PROCEDURE — 73030 X-RAY EXAM OF SHOULDER: CPT | Mod: LT

## 2024-07-01 PROCEDURE — 73130 X-RAY EXAM OF HAND: CPT | Mod: LT

## 2024-07-01 PROCEDURE — 93005 ELECTROCARDIOGRAM TRACING: CPT

## 2024-07-01 PROCEDURE — 36415 COLL VENOUS BLD VENIPUNCTURE: CPT | Performed by: EMERGENCY MEDICINE

## 2024-07-01 ASSESSMENT — ACTIVITIES OF DAILY LIVING (ADL)
ADLS_ACUITY_SCORE: 38

## 2024-07-01 NOTE — ED PROVIDER NOTES
Emergency Department Note      History of Present Illness     Chief Complaint   Fall and Seizures    HPI   Isaura Mckoy is a 54 year old female with a history of seizures and two brain surgeries and who presents for fall and seizure. Partner reports that when isaura was trying to reach out for a bottle, she ended up falling with her head first, and her arm made contact with the concrete surface. He reports that when she fell, her arms and legs started to shake for around two minutes. Once her shaking stopped, she was able to be assisted to her feet. He reports that they stood for a couple minutes prior to walking back to the building. The patient was not talking at all while walking. She endorses left wrist pain. Patient takes medications to manage her seizures, but notes they have been occurring more frequetly. She took her medications late today, and notes she took her morning medications in the afternoon. She is one dose behind today. Patient takes Keppra, Felbatol, and lamotrigine regularly.     Independent Historian   Male  present at bedside and provided partial history.     Review of External Notes   None    Past Medical History     Medical History and Problem List   Anxiety  Bipolar I disorder, mixed  Scoliosis   Stroke   Depression  Epilepsy   Memory loss  Suicidal behavior  Breakthrough seizure  Scoliosis  Personality disorder  Conversion disorder with seizures or convulsions  Hemiplegia and hemiparesis following cerebral infarction affecting left non-dominant side   Protein-calorie malnutrition     Medications   Felbatol  Lamictal  Keppra  Mirena IUD    Surgical History   Craniotomy x2   section   Laser vulvar wart removal    Physical Exam     Patient Vitals for the past 24 hrs:   BP Temp Temp src Pulse Resp SpO2   24 2112 115/62 97.8  F (36.6  C) -- 78 16 99 %   24 1824 121/70 97.8  F (36.6  C) Temporal 84 18 100 %     Physical Exam  Constitutional: Middle aged native  american female. Sitting.  HENT: Abrasions mid frontal scalp, rajeev holes. No hematoma. No bone deformity.   Eyes: EOM are normal. Pupils are equal, round, and reactive to light.   Neck: Full range of motion. Mild tenderness to palpations of the posterior midline. No JVD present. No cervical adenopathy.  Cardiovascular: Regular rhythm.  Exam reveals no gallop and no friction rub.    No murmur heard.  Pulmonary/Chest: No respiratory distress. Bilateral breath sounds normal. No wheezes, rhonchi or rales.  Abdominal: Soft. No tenderness. No rebound or guarding.   Musculoskeletal: No edema. No tenderness. Left hand abrasions over dorsum of thumb. Some swelling of the NCP joint. No bony deformations. No wrist tenderness, Full range of motion. No bony tenderness. Right knee, full range of motion. No effusion. Mild tenderness. No ligament laxity. Lateral humeus, no deformity. No tenderness of clavical or AC joint.   Lymphadenopathy: No lymphadenopathy.   Neurological: Alert and oriented to person, place, and time. Normal strength. Coordination normal. GCS 15.   Skin: Skin is warm and dry. No rash noted. No erythema.      Diagnostics     Lab Results   Labs Ordered and Resulted from Time of ED Arrival to Time of ED Departure   COMPREHENSIVE METABOLIC PANEL - Normal       Result Value    Sodium 139      Potassium 4.5      Carbon Dioxide (CO2) 28      Anion Gap 10      Urea Nitrogen 12.3      Creatinine 0.86      GFR Estimate 81      Calcium 9.6      Chloride 101      Glucose 98      Alkaline Phosphatase 66      AST 24      ALT 8      Protein Total 7.7      Albumin 4.9      Bilirubin Total 0.3     CBC WITH PLATELETS AND DIFFERENTIAL    WBC Count 5.9      RBC Count 4.33      Hemoglobin 13.6      Hematocrit 41.2      MCV 95      MCH 31.4      MCHC 33.0      RDW 13.2      Platelet Count 302      % Neutrophils 63      % Lymphocytes 30      % Monocytes 6      % Eosinophils 1      % Basophils 1      % Immature Granulocytes 0       NRBCs per 100 WBC 0      Absolute Neutrophils 3.7      Absolute Lymphocytes 1.7      Absolute Monocytes 0.4      Absolute Eosinophils 0.1      Absolute Basophils 0.0      Absolute Immature Granulocytes 0.0      Absolute NRBCs 0.0         Imaging   Head CT w/o contrast   Final Result   IMPRESSION:   HEAD CT:   1.  No acute intracranial process.   2.  Unchanged mild, asymmetric volume loss involving the left cerebral hemisphere and left cerebellum.      CERVICAL SPINE CT:   1.  No CT evidence for acute fracture or post traumatic subluxation.      CT Cervical Spine w/o Contrast   Final Result   IMPRESSION:   HEAD CT:   1.  No acute intracranial process.   2.  Unchanged mild, asymmetric volume loss involving the left cerebral hemisphere and left cerebellum.      CERVICAL SPINE CT:   1.  No CT evidence for acute fracture or post traumatic subluxation.      XR Hand Left G/E 3 Views   Final Result   IMPRESSION: No evidence for fracture or dislocation. Flexion contractures of the MCP joints. The etiology for this is uncertain.      XR Shoulder Left G/E 3 Views   Final Result   IMPRESSION: Normal joint spaces and alignment. No acute fracture.        ECG:  ECG results from 07/01/24   EKG 12-lead, tracing only     Value    Systolic Blood Pressure     Diastolic Blood Pressure     Ventricular Rate 86    Atrial Rate 86    NC Interval 156    QRS Duration 76        QTc 416    P Axis 74    R AXIS -22    T Axis 47    Interpretation ECG      Sinus rhythm  Normal ECG  EKG interpreted by me at 1900     Independent Interpretation   I looked at and reviewed the left hand XR and left shoulder XR.     ED Course      Medications Administered   Medications - No data to display    Procedures   Procedures     Discussion of Management   None    ED Course   ED Course as of 07/01/24 2131 Mon Jul 01, 2024 1843 I obtained the history and examined the patient as noted above.      2057 I rechecked and updated the patient.           Optional/Additional Documentation  None    Medical Decision Making / Diagnosis     CMS Diagnoses: None    MIPS       None    MDM   Suzi Mckoy is a 54 year old female who had a witnessed fall by her significant other she hit her head and her left arm she did have generalized seizure for 1 to 2 minutes and was helped up still postictal for at least another 10 to 15 minutes.  She has had multiple breakthrough seizures on triple antiseizure meds but for some reason did not take her morning meds and when she got home in the afternoon after this event then took her morning meds so she is behind at least 1 full dose of all meds.  She does complain of some neck pain but states she does have chronic pain she has no vomiting she has had bur holes placed before her tetanus is up-to-date.  Her workup shows no acute CT head or cervical spine issues and no acute fractures to her shoulder or hand the wounds on her hand were cleaned and dressing and Steri-Strips applied.  Patient should use cold compresses and take over-the-counter pain medication I have asked her to follow-up with her primary care and her seizure doctors in the next week and to make sure she does not miss her seizure medications in the future.  Since she had missed at least 1 full dose of all seizure medicines I do not think levels would be helpful.    Disposition   The patient was discharged.     Diagnosis     ICD-10-CM    1. Seizure (H)  R56.9       2. Closed head injury, initial encounter  S09.90XA       3. Contusion of left shoulder, initial encounter  S40.012A       4. Contusion of left hand, initial encounter  S60.222A       5. Abrasion of hand, unspecified laterality, initial encounter  S60.519A          Discharge Medications   Discharge Medication List as of 7/1/2024  9:07 PM        Scribe Disclosure:  Asia ANGELES, am serving as a scribe at 9:04 PM on 7/1/2024 to document services personally performed by Romero Mcintosh MD based on my  observations and the provider's statements to me.     Scribe Disclosure:  I, Karissa Motley, am serving as the scribe  for Asia Redding, the scribe trainee, at 8:54 PM on 7/1/2024 to document services personally performed by Romero Mcintosh MD based on our observations and the provider's statements to us.         Romero Mcintosh MD  07/02/24 0003

## 2024-07-01 NOTE — ED TRIAGE NOTES
Patient with seizure hx, fell earlier today and has bump on top of head and abrasion to left wrist. States she did not take meds this morning. Injury to left shoulder. Diagnosed with seizures at birth.
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
## 2025-01-07 ENCOUNTER — APPOINTMENT (OUTPATIENT)
Dept: GENERAL RADIOLOGY | Facility: CLINIC | Age: 55
End: 2025-01-07
Attending: EMERGENCY MEDICINE
Payer: COMMERCIAL

## 2025-01-07 ENCOUNTER — APPOINTMENT (OUTPATIENT)
Dept: MRI IMAGING | Facility: CLINIC | Age: 55
End: 2025-01-07
Attending: EMERGENCY MEDICINE
Payer: COMMERCIAL

## 2025-01-07 ENCOUNTER — APPOINTMENT (OUTPATIENT)
Dept: CT IMAGING | Facility: CLINIC | Age: 55
End: 2025-01-07
Attending: EMERGENCY MEDICINE
Payer: COMMERCIAL

## 2025-01-07 ENCOUNTER — HOSPITAL ENCOUNTER (EMERGENCY)
Facility: CLINIC | Age: 55
Discharge: HOME | End: 2025-01-07
Attending: EMERGENCY MEDICINE
Payer: COMMERCIAL

## 2025-01-07 VITALS
TEMPERATURE: 97 F | SYSTOLIC BLOOD PRESSURE: 108 MMHG | RESPIRATION RATE: 19 BRPM | HEART RATE: 69 BPM | DIASTOLIC BLOOD PRESSURE: 69 MMHG | OXYGEN SATURATION: 100 %

## 2025-01-07 DIAGNOSIS — H53.9 VISUAL DISTURBANCE: ICD-10-CM

## 2025-01-07 DIAGNOSIS — S16.1XXA CERVICAL STRAIN, INITIAL ENCOUNTER: ICD-10-CM

## 2025-01-07 LAB
ANION GAP SERPL CALCULATED.3IONS-SCNC: 10 MMOL/L (ref 7–15)
BASOPHILS # BLD AUTO: 0 10E3/UL (ref 0–0.2)
BASOPHILS NFR BLD AUTO: 1 %
BUN SERPL-MCNC: 11.1 MG/DL (ref 6–20)
CALCIUM SERPL-MCNC: 9.4 MG/DL (ref 8.8–10.4)
CHLORIDE SERPL-SCNC: 101 MMOL/L (ref 98–107)
CREAT SERPL-MCNC: 0.91 MG/DL (ref 0.51–0.95)
EGFRCR SERPLBLD CKD-EPI 2021: 76 ML/MIN/1.73M2
EOSINOPHIL # BLD AUTO: 0.1 10E3/UL (ref 0–0.7)
EOSINOPHIL NFR BLD AUTO: 2 %
ERYTHROCYTE [DISTWIDTH] IN BLOOD BY AUTOMATED COUNT: 13 % (ref 10–15)
GLUCOSE SERPL-MCNC: 92 MG/DL (ref 70–99)
HCO3 SERPL-SCNC: 30 MMOL/L (ref 22–29)
HCT VFR BLD AUTO: 40.1 % (ref 35–47)
HGB BLD-MCNC: 12.9 G/DL (ref 11.7–15.7)
IMM GRANULOCYTES # BLD: 0 10E3/UL
IMM GRANULOCYTES NFR BLD: 0 %
LYMPHOCYTES # BLD AUTO: 2.2 10E3/UL (ref 0.8–5.3)
LYMPHOCYTES NFR BLD AUTO: 55 %
MCH RBC QN AUTO: 29.9 PG (ref 26.5–33)
MCHC RBC AUTO-ENTMCNC: 32.2 G/DL (ref 31.5–36.5)
MCV RBC AUTO: 93 FL (ref 78–100)
MONOCYTES # BLD AUTO: 0.4 10E3/UL (ref 0–1.3)
MONOCYTES NFR BLD AUTO: 9 %
NEUTROPHILS # BLD AUTO: 1.3 10E3/UL (ref 1.6–8.3)
NEUTROPHILS NFR BLD AUTO: 33 %
NRBC # BLD AUTO: 0 10E3/UL
NRBC BLD AUTO-RTO: 0 /100
PLATELET # BLD AUTO: 304 10E3/UL (ref 150–450)
POTASSIUM SERPL-SCNC: 4.2 MMOL/L (ref 3.4–5.3)
RBC # BLD AUTO: 4.32 10E6/UL (ref 3.8–5.2)
SODIUM SERPL-SCNC: 141 MMOL/L (ref 135–145)
WBC # BLD AUTO: 4 10E3/UL (ref 4–11)

## 2025-01-07 PROCEDURE — 70450 CT HEAD/BRAIN W/O DYE: CPT

## 2025-01-07 PROCEDURE — 85025 COMPLETE CBC W/AUTO DIFF WBC: CPT | Performed by: EMERGENCY MEDICINE

## 2025-01-07 PROCEDURE — 80048 BASIC METABOLIC PNL TOTAL CA: CPT | Performed by: EMERGENCY MEDICINE

## 2025-01-07 PROCEDURE — 255N000002 HC RX 255 OP 636: Performed by: EMERGENCY MEDICINE

## 2025-01-07 PROCEDURE — 250N000013 HC RX MED GY IP 250 OP 250 PS 637: Performed by: EMERGENCY MEDICINE

## 2025-01-07 PROCEDURE — 99285 EMERGENCY DEPT VISIT HI MDM: CPT | Mod: 25

## 2025-01-07 PROCEDURE — 70553 MRI BRAIN STEM W/O & W/DYE: CPT

## 2025-01-07 PROCEDURE — A9585 GADOBUTROL INJECTION: HCPCS | Performed by: EMERGENCY MEDICINE

## 2025-01-07 PROCEDURE — 82310 ASSAY OF CALCIUM: CPT | Performed by: EMERGENCY MEDICINE

## 2025-01-07 PROCEDURE — 72040 X-RAY EXAM NECK SPINE 2-3 VW: CPT

## 2025-01-07 PROCEDURE — 36415 COLL VENOUS BLD VENIPUNCTURE: CPT | Performed by: EMERGENCY MEDICINE

## 2025-01-07 RX ORDER — LIDOCAINE 4 G/G
1 PATCH TOPICAL ONCE
Status: DISCONTINUED | OUTPATIENT
Start: 2025-01-07 | End: 2025-01-07 | Stop reason: HOSPADM

## 2025-01-07 RX ORDER — GADOBUTROL 604.72 MG/ML
7 INJECTION INTRAVENOUS ONCE
Status: COMPLETED | OUTPATIENT
Start: 2025-01-07 | End: 2025-01-07

## 2025-01-07 RX ADMIN — GADOBUTROL 7 ML: 604.72 INJECTION INTRAVENOUS at 17:40

## 2025-01-07 RX ADMIN — LIDOCAINE 1 PATCH: 4 PATCH TOPICAL at 21:12

## 2025-01-07 ASSESSMENT — ACTIVITIES OF DAILY LIVING (ADL)
ADLS_ACUITY_SCORE: 47

## 2025-01-07 ASSESSMENT — COLUMBIA-SUICIDE SEVERITY RATING SCALE - C-SSRS
6. HAVE YOU EVER DONE ANYTHING, STARTED TO DO ANYTHING, OR PREPARED TO DO ANYTHING TO END YOUR LIFE?: NO
2. HAVE YOU ACTUALLY HAD ANY THOUGHTS OF KILLING YOURSELF IN THE PAST MONTH?: NO
1. IN THE PAST MONTH, HAVE YOU WISHED YOU WERE DEAD OR WISHED YOU COULD GO TO SLEEP AND NOT WAKE UP?: NO

## 2025-01-07 ASSESSMENT — VISUAL ACUITY
OS: WITH CORRECTIVE LENSES
OD: WITH CORRECTIVE LENSES

## 2025-01-07 NOTE — ED PROVIDER NOTES
"  Emergency Department Note      History of Present Illness     Chief Complaint   Eye Problem and Social Work Services      HPI   Suzi Mckoy is a 54 year old female with a history of migraines, arthritis, anxiety, bipolar 1 disorder, and epilepsy who presents to the ED for an evaluation of an eye problem and social work services. The patient reports vision impairment starting 2-3 days ago with today stating that when she got up to walk, she was \"unable to see\" and fell. She further describes the images in her visual field to appear shaky and blurry. She states that at first she thought it was an issue with her glasses, and points out that the vision issues occur bilaterally but primarily in her right eye. She also reports increase in the amount of falls, increased level of stress, not getting enough sleep and not eating properly. The patient adds that this had felt similar to any of the various types of seizures that she has had in the past. She denies any changes in medicine or issues with MRI in the past. Further reports history of stroke (2) and migraines. Notes no new changes with her left sided paralysis. Attributes her speech issues to her loss of teeth.  Patient also states that she has had neck discomfort for well over a month.    The patient also notes concern for returning home this evening as her partner/friend won't help her. She reports that she is looking to get into a shelter but states her owning a service dog has proved to be difficult for her.     Independent Historian   None    Review of External Notes   Reviewed neurology visit from August 14, 2024 for her seizure disorder.    Past Medical History     Medical History and Problem List   Anxiety  Bipolar 1 disorder, mixed  Cerebral embolism with cerebral infarction  Depressive disorder  Epilepsy  Memory loss  Arthritis in shoulder  Migraines  Strokes    Medications "   Benzocaine-menthol  Felbamate  Lamotrigine  Levetiracetam  Levonorgestrel    Surgical History   Craniotomy  Vulvar warts    delivery    Physical Exam     Patient Vitals for the past 24 hrs:   BP Temp Temp src Pulse Resp SpO2   25 1941 108/69 97  F (36.1  C) Temporal 69 19 100 %   25 1457 109/75 98.1  F (36.7  C) Oral 78 18 98 %     Physical Exam  General: Alert, interactive   Head:  Scalp is atraumatic  Eyes:  The pupils are equal, round, and reactive to light    EOM's intact    No scleral icterus.  Funduscopic examination was normal. Visual field is full with confrontation.  Visual acuity is 10/100 in both eyes  ENT:      Nose:  The external nose is normal  Ears:  External ears are normal  Mouth/Throat: Mucus membranes are moist       Neck:  Normal range of motion.      There is no rigidity.    Trachea is in the midline         CV:  Regular rate and rhythm      Resp:  Breath sounds are clear bilaterally    Non-labored, no retractions or accessory muscle use      GI:  Abdomen is soft, no distension, no tenderness.       MS:  Normal strength in all 4 extremities  Skin:  Warm and dry, No rash or lesions noted.  Neuro:   Strength 5/5 x4.  Chronic left sided weakness and decreased sensation.     GCS: 15  Psych: Awake. Alert.  Normal affect.      Appropriate interactions.      Diagnostics     Lab Results   Labs Ordered and Resulted from Time of ED Arrival to Time of ED Departure   BASIC METABOLIC PANEL - Abnormal       Result Value    Sodium 141      Potassium 4.2      Chloride 101      Carbon Dioxide (CO2) 30 (*)     Anion Gap 10      Urea Nitrogen 11.1      Creatinine 0.91      GFR Estimate 76      Calcium 9.4      Glucose 92     CBC WITH PLATELETS AND DIFFERENTIAL - Abnormal    WBC Count 4.0      RBC Count 4.32      Hemoglobin 12.9      Hematocrit 40.1      MCV 93      MCH 29.9      MCHC 32.2      RDW 13.0      Platelet Count 304      % Neutrophils 33      % Lymphocytes 55      % Monocytes 9       % Eosinophils 2      % Basophils 1      % Immature Granulocytes 0      NRBCs per 100 WBC 0      Absolute Neutrophils 1.3 (*)     Absolute Lymphocytes 2.2      Absolute Monocytes 0.4      Absolute Eosinophils 0.1      Absolute Basophils 0.0      Absolute Immature Granulocytes 0.0      Absolute NRBCs 0.0         Imaging   XR Cervical Spine 2/3 Views   Final Result   IMPRESSION: Examination is limited due to positioning/rotation. Straightening of the normal cervical lordosis. Spondylolisthesis and vertebral body heights are not well evaluated. Multilevel spondylosis, not well evaluated. No appreciable dens fracture.    CT is recommended if there is continued clinical concern.            CT Head w/o Contrast   Final Result   IMPRESSION:   1.  No acute intracranial hemorrhage.      MR Brain w/o & w Contrast   Final Result   IMPRESSION:   1.  Nonsuppression of CSF signal within the left frontal lobe, which may be artifactual, though could represent subarachnoid hemorrhage, though this is felt to be less likely . Recommend head CT for further evaluation.   2.  No other evidence of acute cranial hemorrhage, mass effect, or infarction.   3.  Mild nonspecific white matter change.          Independent Interpretation   CT Head: No intracranial hemorrhage or midline shift.    ED Course      Medications Administered   Medications   sodium chloride (PF) 0.9% PF flush 10 mL (has no administration in time range)   Lidocaine (LIDOCARE) 4 % Patch 1 patch (1 patch Transdermal $Patch/Med Applied 1/7/25 2112)   gadobutrol (GADAVIST) injection 7 mL (7 mLs Intravenous $Given 1/7/25 1740)       Procedures   Procedures     Discussion of Management   Social Work, saw the patient to offer [employer] Data resources    ED Course   ED Course as of 01/07/25 6495   Tue Jan 07, 2025   9259 I obtained history and examined the patient as noted above.     1531 Received visual acuity test results.        Additional Documentation  Social Determinants of Health:  Housing Insecurity    Medical Decision Making / Diagnosis     CMS Diagnoses: None    MIPS       None    MDM   Suzi Mckoy is a 54 year old female presenting with a multitude of complaints, including spells of loss of consciousness as well as visual disturbance.  MRI was performed as noted above with no acute findings.  Her vision appears to be globally decreased but there is no visual field cuts, funduscopic exam is unremarkable, she has no pain to suggest glaucoma, there is no signs of an acute infectious or traumatic etiology.  Findings are not consistent with a retinal detachment.  Her visual symptoms largely resolved without intervention in the emergency department.  On initial history the patient did not mention neck pain however at the time of receiving her discharge instructions she states that she has had 1 to 2 months of neck pain, subsequently radiograph was performed that demonstrates arthritic changes, no acute fractures.  I doubt vertebral or carotid artery dissection and do not believe she needs any further workup for this in the emergency department, I have placed a Lidoderm patch for pain management.  I have recommended follow-up with her neurologist as well as ophthalmologist and return to the emergency department if new symptoms develop.  She will otherwise continue her home medications.    Disposition   The patient was discharged.     Diagnosis     ICD-10-CM    1. Visual disturbance  H53.9       2. Cervical strain, initial encounter  S16.1XXA            Discharge Medications   Discharge Medication List as of 1/7/2025  9:03 PM            Scribe Disclosure:  I, Meredith Crump, am serving as a scribe at 3:00 PM on 1/7/2025 to document services personally performed by Calvin Her MD based on my observations and the provider's statements to me.        Calvin Her MD  01/07/25 0533

## 2025-01-07 NOTE — ED TRIAGE NOTES
BIBA:  Sudden vision changes. Blurry. Ambulating just fine.   Neuro history.  TBI. Epilepsy.  18g IV.  136 BGM  Concerned about her living situation. Pt states she is not safe.   Has a .

## 2025-01-08 ENCOUNTER — APPOINTMENT (OUTPATIENT)
Dept: CT IMAGING | Facility: CLINIC | Age: 55
End: 2025-01-08
Attending: EMERGENCY MEDICINE
Payer: COMMERCIAL

## 2025-01-08 ENCOUNTER — HOSPITAL ENCOUNTER (EMERGENCY)
Facility: CLINIC | Age: 55
Discharge: HOME | End: 2025-01-08
Attending: EMERGENCY MEDICINE
Payer: COMMERCIAL

## 2025-01-08 VITALS
HEIGHT: 63 IN | OXYGEN SATURATION: 99 % | HEART RATE: 91 BPM | BODY MASS INDEX: 23.04 KG/M2 | SYSTOLIC BLOOD PRESSURE: 116 MMHG | RESPIRATION RATE: 16 BRPM | DIASTOLIC BLOOD PRESSURE: 71 MMHG | WEIGHT: 130 LBS | TEMPERATURE: 98.3 F

## 2025-01-08 DIAGNOSIS — M54.2 NECK PAIN: ICD-10-CM
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
## 2025-01-08 PROCEDURE — 72125 CT NECK SPINE W/O DYE: CPT

## 2025-01-08 PROCEDURE — 70450 CT HEAD/BRAIN W/O DYE: CPT

## 2025-01-08 PROCEDURE — 99284 EMERGENCY DEPT VISIT MOD MDM: CPT | Mod: 25

## 2025-01-08 RX ORDER — ACETAMINOPHEN 500 MG
1000 TABLET ORAL ONCE
Status: COMPLETED | OUTPATIENT
Start: 2025-01-08 | End: 2025-01-08

## 2025-01-08 ASSESSMENT — ACTIVITIES OF DAILY LIVING (ADL)
ADLS_ACUITY_SCORE: 47
ADLS_ACUITY_SCORE: 47

## 2025-01-08 NOTE — CONSULTS
Care Management Follow Up    Length of Stay (days): 0    Expected Discharge Date:       Concerns to be Addressed:       Patient plan of care discussed at interdisciplinary rounds: No    Anticipated Discharge Disposition:                Anticipated Discharge Services:    Anticipated Discharge DME:      Patient/family educated on Medicare website which has current facility and service quality ratings:    Education Provided on the Discharge Plan:    Patient/Family in Agreement with the Plan:      Referrals Placed by CM/SW:    Private pay costs discussed: Not applicable    Discussed  Partnership in Safe Discharge Planning  document with patient/family: No     Handoff Completed: No, handoff not indicated or clinically appropriate    Additional Information:  Writer received notice from physician that patient would benefit from speaking with . Writer spoke with patient and patient's friend, Carlito, in ED waiting room area. Patient stated that she is needing assistance with locating temporary housing while searching for low income apartment. Patient has a service dog that has caused difficulty with locating shelter/new home within budget. Patient confirmed that she will stay with friend Carlito armijo. Patient requested food as she has not eaten in a while and that can be a trigger for seizures. Writer spoke with physician to share discussion with patient. Physician stated he will talk with nurses.    Addendum 19:36     compiled information regarding [facility]), [place] [facility] for individuals aged 50 - 61, and a Temporary Pet Housing resource through the [employer]. Writer provided to patient for review and potential options while seeking new place to live.       Next Steps: Follow for discharge planning    INGRID Locke  [facility]  Inpatient Care Management

## 2025-01-08 NOTE — ED PROVIDER NOTES
"  Emergency Department Note      History of Present Illness     Chief Complaint   Fall    HPI   Suzi Mckoy is a 54 year old female with a history of bipolar disorder, cerebral aneurysm and stroke presenting with fall. The patient states she slipped in the kitchen and hit her head. She denies loss of conscious or leg pain. The patient endorses posterior neck pain, bilateral blurry vision and headache. The patient also states she is experiencing mid arm that radiates to the shoulder with movement. She noted she has a history of left sided paralysis.     Independent Historian   None    Review of External Notes   Reviewed workup that she had a few hours ago at [facility] emergency department  Past Medical History     Medical History and Problem List   Anxiety  Bipolar 1 disorder, mixed  Cerebral embolism with cerebral infarction  Depressive disorder  Epilepsy  Memory loss  Arthritis in shoulder  Migraines  Strokes    Medications   Benzocaine-menthol  Felbamate  Lamotrigine  Levetiracetam  Levonorgestrel    Surgical History   Craniotomy  Vulvar warts    delivery    Physical Exam     Patient Vitals for the past 24 hrs:   BP Temp Temp src Pulse Resp SpO2 Height Weight   25 0021 116/71 98.3  F (36.8  C) Oral 91 16 99 % 1.6 m (5' 3\") 59 kg (130 lb)     Physical Exam  General: Sitting up in bed  Eyes:  The pupils are equal and round    Conjunctivae and sclerae are normal  ENT:    No facial/head trauma  Neck:  Wearing a cervical collar. Mild tenderness on posterior neck  CV:  Regular rate,  regular rhythm     Skin warm and well perfused   Resp:  Non labored breathing on room air    No tachypnea    No cough heard    Lungs clear bilaterally  GI:  Abdomen is soft, there is no rigidity    No distension    No rebound tenderness     No abdominal tenderness  MS:  Non tender UE/LE. I am able to move bilateral UE in full ROM w/o pain.  Nontender pelvis.  Nontender thoracic and lumbar spine  Skin:  No rash or acute " skin lesions noted  Neuro:   Awake, alert.  GCS 15    Speech is normal and fluent.    Face is symmetric.     Chronic left sided weakness (mild)    Reports  numbness on left side of body- chronic  Psych: Normal affect.  Appropriate interactions.     Diagnostics     Imaging   CT Head w/o Contrast   Final Result   IMPRESSION:   1.  No CT evidence for acute intracranial findings.   2.  Chronic changes as above, similar to prior.      CT Cervical Spine w/o Contrast   Final Result   IMPRESSION:   1.  No fracture or posttraumatic subluxation.           ED Course      Medications Administered   Medications   acetaminophen (TYLENOL) tablet 1,000 mg (1,000 mg Oral Not Given 1/8/25 0046)     ED Course   ED Course as of 01/08/25 0105   Wed Jan 08, 2025   0022 I obtained history and examined the patient as noted above      Medical Decision Making / Diagnosis     MDM   Suzi Mckoy is a 54 year old female who presented to the emergency department with head and neck pain after a fall.  This was a mechanical fall.  Reports that she slipped in the kitchen as her boyfriend recently wash the floors and her socks are slippery.  No indication for blood work.  She had blood work done earlier today.  CT head and CT neck showed no acute pathology.  Does not seem to have any pain or tenderness on upper or lower extremities.  No other injuries from the fall.  Prior to me being able to discuss results with the patient, patient was wanting to go to another hospital.  This is not indicated.  Patient can be discharged and make her own decisions and there is no indication for further testing in the emergency department    Disposition   The patient was discharged.     Diagnosis     ICD-10-CM    1. Fall, initial encounter  W19.XXXA       2. Neck pain  M54.2          Scribe Disclosure:  Mirna ANGELES, am serving as a scribe at 12:32 AM on 1/8/2025 to document services personally performed by Lili Whitt MD based on my  observations and the provider's statements to me.     Scribe Disclosure:  I, Karissa Motley, am serving as the scribe  for Mirna Gonzalez, the scribe trainee, at 1:05 AM on 1/8/2025 to document services personally performed by Lili Whitt MD based on our observations and the provider's statements to us.         Lili Whitt MD  01/08/25 0551

## 2025-01-08 NOTE — ED NOTES
Patient stating she cannot see.  Is picking up discharge paperwork off bed per self.  Has been using her cellphone.  Grabbed arm of wheelchair per self.  Continues to request to be transferred to [facility].  Instructed she will have to request her own transfer & is free to go where she choses.  Transferred to wheelchair & brought to waiting room with male friend.

## 2025-01-08 NOTE — ED NOTES
Patient requesting to be transferred to another hospital.  MD aware.  Declines to get up & walk.  Requests cervical collar be put back on.  Soft foam collar applied per MD order.

## 2025-01-08 NOTE — ED NOTES
Bed: ED05  Expected date:   Expected time:   Means of arrival:   Comments:  T[address] 543 64F fall, no thinners, possible LOC eta 0016

## 2025-01-08 NOTE — ED TRIAGE NOTES
Pt BIBA from home. Pt was recently seen at [facility] ER on 1/7. Pt states 10 minutes after she got home she slipped and fell in her kitchen hitting the back of her head. Pt endorses LOC and denies blood thinners. Pt endorses HA, neck pain, bilateral arm pain and L hip pain. Per EMS VSS     Triage Assessment (Adult)       Row Name 01/08/25 0021          Triage Assessment    Airway WDL WDL        Respiratory WDL    Respiratory WDL WDL        Skin Circulation/Temperature WDL    Skin Circulation/Temperature WDL WDL        Peripheral/Neurovascular WDL    Peripheral Neurovascular WDL WDL        Cognitive/Neuro/Behavioral WDL    Cognitive/Neuro/Behavioral WDL WDL

## 2025-01-08 NOTE — ED NOTES
"Patient with call light on & yelling \"Hello\".   Requests RN to call her boyfriend.  She was just talking to her boyfriend on the phone.  States \"no, I have 2 boyfriends\".   Patient has her cell phone & is able to make calls.   Declines acetaminophen.  States \"I am allergic to anything generic\".  MD aware.  "

## 2025-05-22 NOTE — PROGRESS NOTES
Care Coordinator was called concerning need for a PCP.  Met with pt.  She has a doll in bed with her.  Pt noted she needed to go to a facility.  Reportedly, the care team had all discussed this for the last two days and it was deterrmined she would transition home.  Therapies and psychiatry have been involved.  Psychiatric admission was offered to pt, which she had refused.  Pt really did not know who her PCP was.  In chart review, it was noted that she saw Dr Silvana Gonzales at [facility] last November, so a follow up was made for 9/30/19 with this physician.  Pt noted the only doctor that was able to help her was her Neurologist at the [facility], Dr Hernandez.  Unfortunately, he is out all next week.  She has been scheduled to see him on 10/11/19.  Home care RN and OT were also ordered.  Pt was nonspecific as to which home care agency she wanted to use and questioned why she needed this.  She was in agreement with [facility] and referral was sent.  Feel she is a high risk for readmission.  If this occurs, hopefully she will be reassessed for Psychiatry admission.   Future Appointments   Date Time Provider Department Center   4/27/2026 12:00 PM Skyler Duarte APRN CNP [facility]